# Patient Record
Sex: FEMALE | Race: BLACK OR AFRICAN AMERICAN | NOT HISPANIC OR LATINO | Employment: OTHER | URBAN - METROPOLITAN AREA
[De-identification: names, ages, dates, MRNs, and addresses within clinical notes are randomized per-mention and may not be internally consistent; named-entity substitution may affect disease eponyms.]

---

## 2017-01-19 DIAGNOSIS — M06.9 RHEUMATOID ARTHRITIS OF HAND, UNSPECIFIED LATERALITY, UNSPECIFIED RHEUMATOID FACTOR PRESENCE: ICD-10-CM

## 2017-01-19 DIAGNOSIS — M16.9 OSTEOARTHROSIS OF HIP: ICD-10-CM

## 2017-01-19 DIAGNOSIS — M25.552 HIP PAIN, LEFT: ICD-10-CM

## 2017-01-19 DIAGNOSIS — E03.9 ACQUIRED HYPOTHYROIDISM: ICD-10-CM

## 2017-01-20 RX ORDER — METHYLPREDNISOLONE 4 MG/1
TABLET ORAL
Qty: 21 TABLET | Refills: 3 | Status: SHIPPED | OUTPATIENT
Start: 2017-01-20 | End: 2017-01-30

## 2017-01-20 RX ORDER — LEFLUNOMIDE 20 MG/1
TABLET ORAL
Qty: 30 TABLET | Refills: 3 | Status: SHIPPED | OUTPATIENT
Start: 2017-01-20 | End: 2017-01-30 | Stop reason: SDUPTHER

## 2017-01-30 ENCOUNTER — OFFICE VISIT (OUTPATIENT)
Dept: RHEUMATOLOGY | Facility: CLINIC | Age: 62
End: 2017-01-30
Payer: MEDICARE

## 2017-01-30 VITALS
HEART RATE: 80 BPM | WEIGHT: 218.5 LBS | DIASTOLIC BLOOD PRESSURE: 64 MMHG | SYSTOLIC BLOOD PRESSURE: 105 MMHG | BODY MASS INDEX: 37.5 KG/M2

## 2017-01-30 DIAGNOSIS — M16.0 PRIMARY OSTEOARTHRITIS OF BOTH HIPS: ICD-10-CM

## 2017-01-30 DIAGNOSIS — R70.0 ESR RAISED: ICD-10-CM

## 2017-01-30 DIAGNOSIS — E03.9 ACQUIRED HYPOTHYROIDISM: ICD-10-CM

## 2017-01-30 DIAGNOSIS — M25.552 HIP PAIN, LEFT: ICD-10-CM

## 2017-01-30 DIAGNOSIS — M16.9 OSTEOARTHROSIS OF HIP: ICD-10-CM

## 2017-01-30 DIAGNOSIS — M06.9 RHEUMATOID ARTHRITIS OF HAND, UNSPECIFIED LATERALITY, UNSPECIFIED RHEUMATOID FACTOR PRESENCE: ICD-10-CM

## 2017-01-30 PROCEDURE — 3078F DIAST BP <80 MM HG: CPT | Mod: S$GLB,,, | Performed by: INTERNAL MEDICINE

## 2017-01-30 PROCEDURE — 3074F SYST BP LT 130 MM HG: CPT | Mod: S$GLB,,, | Performed by: INTERNAL MEDICINE

## 2017-01-30 PROCEDURE — 99215 OFFICE O/P EST HI 40 MIN: CPT | Mod: 25,S$GLB,, | Performed by: INTERNAL MEDICINE

## 2017-01-30 PROCEDURE — 99499 UNLISTED E&M SERVICE: CPT | Mod: S$GLB,,, | Performed by: INTERNAL MEDICINE

## 2017-01-30 PROCEDURE — 96372 THER/PROPH/DIAG INJ SC/IM: CPT | Mod: S$GLB,,, | Performed by: INTERNAL MEDICINE

## 2017-01-30 PROCEDURE — 99999 PR PBB SHADOW E&M-EST. PATIENT-LVL III: CPT | Mod: PBBFAC,,, | Performed by: INTERNAL MEDICINE

## 2017-01-30 RX ORDER — LEFLUNOMIDE 20 MG/1
20 TABLET ORAL DAILY
Qty: 30 TABLET | Refills: 6 | Status: SHIPPED | OUTPATIENT
Start: 2017-01-30 | End: 2017-06-27 | Stop reason: SDUPTHER

## 2017-01-30 RX ORDER — METHYLPREDNISOLONE ACETATE 80 MG/ML
160 INJECTION, SUSPENSION INTRA-ARTICULAR; INTRALESIONAL; INTRAMUSCULAR; SOFT TISSUE
Status: COMPLETED | OUTPATIENT
Start: 2017-01-30 | End: 2017-01-30

## 2017-01-30 RX ORDER — CYANOCOBALAMIN 1000 UG/ML
1000 INJECTION, SOLUTION INTRAMUSCULAR; SUBCUTANEOUS
Qty: 30 ML | Refills: 3 | Status: SHIPPED | OUTPATIENT
Start: 2017-01-30 | End: 2017-01-30

## 2017-01-30 RX ORDER — KETOROLAC TROMETHAMINE 30 MG/ML
60 INJECTION, SOLUTION INTRAMUSCULAR; INTRAVENOUS
Status: COMPLETED | OUTPATIENT
Start: 2017-01-30 | End: 2017-01-30

## 2017-01-30 RX ORDER — CELECOXIB 200 MG/1
200 CAPSULE ORAL DAILY
Qty: 30 CAPSULE | Refills: 5 | Status: SHIPPED | OUTPATIENT
Start: 2017-01-30 | End: 2017-02-17

## 2017-01-30 RX ADMIN — KETOROLAC TROMETHAMINE 60 MG: 30 INJECTION, SOLUTION INTRAMUSCULAR; INTRAVENOUS at 04:01

## 2017-01-30 RX ADMIN — METHYLPREDNISOLONE ACETATE 160 MG: 80 INJECTION, SUSPENSION INTRA-ARTICULAR; INTRALESIONAL; INTRAMUSCULAR; SOFT TISSUE at 04:01

## 2017-01-30 ASSESSMENT — ROUTINE ASSESSMENT OF PATIENT INDEX DATA (RAPID3)
FATIGUE SCORE: 5
PATIENT GLOBAL ASSESSMENT SCORE: 5
MDHAQ FUNCTION SCORE: 1.8
PAIN SCORE: 10
PSYCHOLOGICAL DISTRESS SCORE: 5.5
WHEN YOU AWAKENED IN THE MORNING OVER THE LAST WEEK, PLEASE INDICATE THE AMOUNT OF TIME IT TAKES UNTIL YOU ARE AS LIMBER AS YOU WILL BE FOR THE DAY: ALL DAY
TOTAL RAPID3 SCORE: 7
AM STIFFNESS SCORE: 1, YES

## 2017-01-30 NOTE — PROGRESS NOTES
Subjective:       Patient ID: Vida Ornelas is a 61 y.o. female.    Chief Complaint: Follow-up    HPI Comments: RA: on arava, and medrol dose pack.she had sever lower back pain, L si joint pain and hip pain.  She a si joint injection and hip injection with no releif Patient complains of arthralgias and myalgias for which has been present for a few years. Pain is located in multiple joints, both shoulder(s), both elbow(s), both wrist(s), both MCP(s): 1st, 2nd, 3rd, 4th and 5th, both PIP(s): 1st, 2nd, 3rd, 4th and 5th, both DIP(s): 1st and 2nd, both hip(s), both knee(s) and both MTP(s): 1st, 2nd, 3rd, 4th and 5th, is described as aching, pulsating, shooting and throbbing, and is constant, moderate .  Associated symptoms include: crepitation, decreased range of motion, edema, effusion, tenderness and warmth.               Associated symptoms include fatigue.     Her past medical history is significant for osteoarthritis.           The symptoms have been improving. Affected locations include the neck, right MCP, right PIP, right DIP, left PIP, left DIP, left MCP and left wrist. Associated symptoms include fatigue.     Her past medical history is significant for osteoarthritis. Factors aggravating her arthritis include activity.           Associated symptoms include fatigue.     Her past medical history is significant for osteoarthritis.   Hip Pain   Associated symptoms include arthralgias, fatigue and neck pain. Pertinent negatives include no abdominal pain, anorexia, change in bowel habit, chest pain, chills, congestion, coughing, diaphoresis, nausea, numbness, rash, sore throat, vomiting or weakness.   Osteoarthritis   Associated symptoms include arthralgias, fatigue and neck pain. Pertinent negatives include no abdominal pain, anorexia, change in bowel habit, chest pain, chills, congestion, coughing, diaphoresis, nausea, numbness, rash, sore throat, vomiting or weakness.     Review of Systems   Constitutional:  Positive for fatigue. Negative for activity change, appetite change, chills, diaphoresis and unexpected weight change.   HENT: Negative for congestion, dental problem, ear discharge, ear pain, facial swelling, mouth sores, nosebleeds, postnasal drip, rhinorrhea, sinus pressure, sneezing, sore throat, tinnitus and voice change.    Eyes: Negative for photophobia, pain, discharge, redness and itching.   Respiratory: Negative for apnea, cough, chest tightness, shortness of breath and wheezing.    Cardiovascular: Negative for chest pain, palpitations and leg swelling.   Gastrointestinal: Negative for abdominal distention, abdominal pain, anorexia, change in bowel habit, constipation, diarrhea, nausea and vomiting.   Endocrine: Negative for cold intolerance, heat intolerance, polydipsia and polyuria.   Genitourinary: Negative for decreased urine volume, difficulty urinating, flank pain, frequency, hematuria and urgency.   Musculoskeletal: Positive for arthralgias and neck pain. Negative for back pain, gait problem and neck stiffness.   Skin: Negative for pallor, rash and wound.   Allergic/Immunologic: Negative for immunocompromised state.   Neurological: Negative for dizziness, tremors, weakness and numbness.   Hematological: Negative for adenopathy. Does not bruise/bleed easily.   Psychiatric/Behavioral: Negative for sleep disturbance. The patient is not nervous/anxious.          Objective:     Visit Vitals    /64 (BP Location: Left arm, Patient Position: Sitting, BP Method: Automatic)    Pulse 80    Wt 99.1 kg (218 lb 7.6 oz)    BMI 37.5 kg/m2        Physical Exam   Nursing note and vitals reviewed.  Constitutional: She is oriented to person, place, and time and well-developed, well-nourished, and in no distress.   HENT:   Head: Normocephalic and atraumatic.   Mouth/Throat: Oropharynx is clear and moist.   Eyes: EOM are normal. Pupils are equal, round, and reactive to light.   Neck: Neck supple. No thyromegaly  present.   Cardiovascular: Normal rate, regular rhythm and normal heart sounds.  Exam reveals no gallop and no friction rub.    No murmur heard.  Pulmonary/Chest: Breath sounds normal. She has no wheezes. She has no rales. She exhibits no tenderness.   Abdominal: There is no tenderness. There is no rebound and no guarding.       Right Side Rheumatological Exam     The patient is tender to palpation of the shoulder, wrist, knee, 1st PIP, 1st MCP, 2nd PIP, 2nd MCP, 3rd PIP, 3rd MCP, 4th PIP, 4th MCP, 5th PIP and 5th MCP    She has swelling of the shoulder, elbow, wrist and knee    Shoulder Exam   Tenderness Location: no tenderness    Range of Motion   Active Abduction: abnormal   Adduction: abnormal  Sensation: normal    Knee Exam   Patellofemoral Crepitus: positive  Effusion: positive  Sensation: normal    Hip Exam   Tenderness Location: posterior and anterior    Range of Motion   Internal Rotation: abnormal   External Rotation: abnormal   Sensation: normal    Elbow/Wrist Exam   Tenderness Location: no tenderness  Sensation: normal    Left Side Rheumatological Exam     Examination finds the elbow normal.    The patient is tender to palpation of the wrist, knee, 1st PIP, 1st MCP, 2nd PIP, 2nd MCP, 3rd PIP, 3rd MCP, 4th PIP, 4th MCP, 5th PIP and 5th MCP.    She has swelling of the shoulder, wrist and knee    Shoulder Exam   Tenderness Location: no tenderness    Range of Motion   Active Abduction: abnormal   Sensation: normal    Knee Exam     Patellofemoral Crepitus: negative  Effusion: negative  Sensation: normal    Hip Exam   Tenderness Location: no tenderness  Sensation: normal    Elbow/Wrist Exam   Sensation: normal      Back/Neck Exam   Tenderness Right paramedian tenderness of the Lower C-Spine and Upper C-Spine.Left paramedian tenderness of the Lower C-Spine and Upper C-Spine.      Lymphadenopathy:     She has no cervical adenopathy.   Neurological: She is alert and oriented to person, place, and time. Gait  normal.   Skin: No rash noted. No erythema. No pallor.     Psychiatric: Mood and affect normal.   Musculoskeletal: She exhibits edema, tenderness and deformity.          Results for orders placed or performed in visit on 09/26/16   Comprehensive metabolic panel   Result Value Ref Range    Sodium 142 136 - 145 mmol/L    Potassium 3.8 3.5 - 5.1 mmol/L    Chloride 107 95 - 110 mmol/L    CO2 27 23 - 29 mmol/L    Glucose 88 70 - 110 mg/dL    BUN, Bld 15 6 - 20 mg/dL    Creatinine 0.8 0.5 - 1.4 mg/dL    Calcium 9.9 8.7 - 10.5 mg/dL    Total Protein 7.1 6.0 - 8.4 g/dL    Albumin 3.7 3.5 - 5.2 g/dL    Total Bilirubin 0.5 0.1 - 1.0 mg/dL    Alkaline Phosphatase 98 55 - 135 U/L    AST 21 10 - 40 U/L    ALT 15 10 - 44 U/L    Anion Gap 8 8 - 16 mmol/L    eGFR if African American >60.0 >60 mL/min/1.73 m^2    eGFR if non African American >60.0 >60 mL/min/1.73 m^2   CBC auto differential   Result Value Ref Range    WBC 5.05 3.90 - 12.70 K/uL    RBC 4.50 4.00 - 5.40 M/uL    Hemoglobin 12.6 12.0 - 16.0 g/dL    Hematocrit 39.0 37.0 - 48.5 %    MCV 87 82 - 98 fL    MCH 28.0 27.0 - 31.0 pg    MCHC 32.3 32.0 - 36.0 %    RDW 13.8 11.5 - 14.5 %    Platelets 214 150 - 350 K/uL    MPV 12.8 9.2 - 12.9 fL    Gran # 2.0 1.8 - 7.7 K/uL    Lymph # 2.4 1.0 - 4.8 K/uL    Mono # 0.5 0.3 - 1.0 K/uL    Eos # 0.1 0.0 - 0.5 K/uL    Baso # 0.04 0.00 - 0.20 K/uL    Gran% 39.2 38.0 - 73.0 %    Lymph% 48.1 (H) 18.0 - 48.0 %    Mono% 9.9 4.0 - 15.0 %    Eosinophil% 2.0 0.0 - 8.0 %    Basophil% 0.8 0.0 - 1.9 %    Differential Method Automated    Cyclic citrul peptide antibody, IgG   Result Value Ref Range    CCP Antibodies <0.5 <5.0 U/mL   Rheumatoid factor   Result Value Ref Range    Rheumatoid Factor <10.0 0.0 - 15.0 IU/mL   Sedimentation rate, manual   Result Value Ref Range    Sed Rate 28 (H) 0 - 20 mm/Hr   Protein electrophoresis, serum   Result Value Ref Range    Protein, Serum 6.7 6.0 - 8.4 g/dL    Albumin grams/dl 3.93 3.35 - 5.55 g/dL    Alpha-1  grams/dl 0.33 0.17 - 0.41 g/dL    Alpha-2 grams/dl 0.80 0.43 - 0.99 g/dL    Beta grams/dl 0.82 0.50 - 1.10 g/dL    Gamma grams/dl 0.82 0.67 - 1.58 g/dL   Immunoglobulin free LT chains blood   Result Value Ref Range    Kappa Free Light Chains 0.96 0.33 - 1.94 mg/dL    Lambda Free Light Chains 0.77 0.57 - 2.63 mg/dL    Kappa/Lambda FLC Ratio 1.25 0.26 - 1.65   Pathologist Interpretation SPE   Result Value Ref Range    Pathologist Interpretation SPE REVIEWED    WBC3.90 - 12.70 K/uL  2.61 (L)  3.68 (L)  4.53  5.89  4.04  RBC4.00 - 5.40 M/uL  4.32  5.09  4.47  4.26  4.42  Rqiuncaasp22.0 - 16.0 g/dL  11.9 (L)  14.0  12.5  11.7 (L)  12.5  Blehkbiias41.0 - 48.5 %  37.6  42.5  39.1  36.8 (L)  37.7  MCV82 - 98 fL  87  84  88  86  85  MCH27.0 - 31.0 pg  27.5  27.5  28.0  27.5  28.3  MCHC32.0 - 36.0 %  31.6 (L)  32.9  32.0  31.8 (L)  33.2  RDW11.5 - 14.5 %  13.3  12.2  13.6  13.5  14.1  Hvjjtoxbe719 - 350 K/uL  169  167  189  182  186  MPV9.2 - 12.9 fL  SEE COMMENT  SEE COMMENTCM  SEE COMMENTCM  SEE COMMENTCM  12.5  Comments: Result not available. Gran #1.8 - 7.7 K/uL  1.0 (L)  1.7 (L)  2.3  4.4  1.9  Lymph #1.0 - 4.8 K/uL  1.2  1.5  1.6  1.1  1.6  Mono #0.3 - 1.0 K/uL  0.3  0.5  0.4  0.4  0.4  Eos #0.0 - 0.5 K/uL  0.1  0.1  0.1  0.0  0.1  Baso #0.00 - 0.20 K/uL  0.02  0.03  0.02  0.01  0.02  Gran%38.0 - 73.0 %  37.2 (L)  44.9  51.7  74.7 (H)  47.5  Lymph%18.0 - 48.0 %  47.1  40.2  36.2  18.3  39.9  Mono%4.0 - 15.0 %  11.5  12.2  9.7  6.6  10.1  Eosinophil%0.0 - 8.0 %  3.4  1.9  2.0  0.2  2.0  Basophil%0.0 - 1.9 %  0.8  Sed Rate0 - 20 mm/Hr  34 (H)  29 (H)  22 (H)  41 (H)  23 (H)  Resulting [facility]  TAD ScreenNegative <1:160  Negative <1:160  Negative <1:160  Iron 30 - 160 ug/dL 80 57     Transferrin 200 - 375 mg/dL 240     TIBC 250 - 450 ug/dL 355 344.0R    Saturated Iron 20 - 50 % 23 17 (L)   Resulting Agency  [facility]      Specimen Collected: 08/22/16  9:55 AM Last Resulted: 08/22/16  3:50 PM            Assessment:        Encounter Diagnoses   Name Primary?    Rheumatoid arthritis of hand, unspecified laterality, unspecified rheumatoid factor presence     Hip pain, left     Osteoarthrosis of hip     Acquired hypothyroidism     ESR raised     Primary osteoarthritis of both hips          Plan:     Rheumatoid arthritis of hand, unspecified laterality, unspecified rheumatoid factor presence  -     Discontinue: cyanocobalamin 1,000 mcg/mL injection; Inject 1 mL (1,000 mcg total) into the skin every 7 days.  Dispense: 30 mL; Refill: 3  -     leflunomide (ARAVA) 20 MG Tab; Take 1 tablet (20 mg total) by mouth once daily.  Dispense: 30 tablet; Refill: 6  -     methylPREDNISolone acetate injection 160 mg; Inject 2 mLs (160 mg total) into the muscle one time.  -     ketorolac injection 60 mg; Inject 2 mLs (60 mg total) into the muscle one time.  -     Comprehensive metabolic panel; Future; Expected date: 1/30/17  -     CBC auto differential; Future; Expected date: 1/30/17  -     C-reactive protein; Future; Expected date: 1/30/17  -     Sedimentation rate, manual; Future; Expected date: 1/30/17  -     X-Ray Hip 2 View Left; Future; Expected date: 1/30/17    Hip pain, left  -     Discontinue: cyanocobalamin 1,000 mcg/mL injection; Inject 1 mL (1,000 mcg total) into the skin every 7 days.  Dispense: 30 mL; Refill: 3  -     leflunomide (ARAVA) 20 MG Tab; Take 1 tablet (20 mg total) by mouth once daily.  Dispense: 30 tablet; Refill: 6  -     methylPREDNISolone acetate injection 160 mg; Inject 2 mLs (160 mg total) into the muscle one time.  -     ketorolac injection 60 mg; Inject 2 mLs (60 mg total) into the muscle one time.  -     Comprehensive metabolic panel; Future; Expected date: 1/30/17  -     CBC auto differential; Future; Expected date: 1/30/17  -     C-reactive protein; Future; Expected date: 1/30/17  -     Sedimentation rate, manual; Future; Expected date: 1/30/17  -     X-Ray Hip 2 View Left; Future; Expected date:  1/30/17    Osteoarthrosis of hip  -     Discontinue: cyanocobalamin 1,000 mcg/mL injection; Inject 1 mL (1,000 mcg total) into the skin every 7 days.  Dispense: 30 mL; Refill: 3  -     leflunomide (ARAVA) 20 MG Tab; Take 1 tablet (20 mg total) by mouth once daily.  Dispense: 30 tablet; Refill: 6  -     methylPREDNISolone acetate injection 160 mg; Inject 2 mLs (160 mg total) into the muscle one time.  -     ketorolac injection 60 mg; Inject 2 mLs (60 mg total) into the muscle one time.  -     Comprehensive metabolic panel; Future; Expected date: 1/30/17  -     CBC auto differential; Future; Expected date: 1/30/17  -     C-reactive protein; Future; Expected date: 1/30/17  -     Sedimentation rate, manual; Future; Expected date: 1/30/17  -     X-Ray Hip 2 View Left; Future; Expected date: 1/30/17    Acquired hypothyroidism  -     Discontinue: cyanocobalamin 1,000 mcg/mL injection; Inject 1 mL (1,000 mcg total) into the skin every 7 days.  Dispense: 30 mL; Refill: 3  -     leflunomide (ARAVA) 20 MG Tab; Take 1 tablet (20 mg total) by mouth once daily.  Dispense: 30 tablet; Refill: 6  -     methylPREDNISolone acetate injection 160 mg; Inject 2 mLs (160 mg total) into the muscle one time.  -     ketorolac injection 60 mg; Inject 2 mLs (60 mg total) into the muscle one time.  -     Comprehensive metabolic panel; Future; Expected date: 1/30/17  -     CBC auto differential; Future; Expected date: 1/30/17  -     C-reactive protein; Future; Expected date: 1/30/17  -     Sedimentation rate, manual; Future; Expected date: 1/30/17  -     X-Ray Hip 2 View Left; Future; Expected date: 1/30/17    ESR raised  -     Discontinue: cyanocobalamin 1,000 mcg/mL injection; Inject 1 mL (1,000 mcg total) into the skin every 7 days.  Dispense: 30 mL; Refill: 3  -     leflunomide (ARAVA) 20 MG Tab; Take 1 tablet (20 mg total) by mouth once daily.  Dispense: 30 tablet; Refill: 6  -     methylPREDNISolone acetate injection 160 mg; Inject 2 mLs  (160 mg total) into the muscle one time.  -     ketorolac injection 60 mg; Inject 2 mLs (60 mg total) into the muscle one time.  -     Comprehensive metabolic panel; Future; Expected date: 1/30/17  -     CBC auto differential; Future; Expected date: 1/30/17  -     C-reactive protein; Future; Expected date: 1/30/17  -     Sedimentation rate, manual; Future; Expected date: 1/30/17  -     X-Ray Hip 2 View Left; Future; Expected date: 1/30/17    Primary osteoarthritis of both hips  -     Discontinue: cyanocobalamin 1,000 mcg/mL injection; Inject 1 mL (1,000 mcg total) into the skin every 7 days.  Dispense: 30 mL; Refill: 3  -     leflunomide (ARAVA) 20 MG Tab; Take 1 tablet (20 mg total) by mouth once daily.  Dispense: 30 tablet; Refill: 6  -     methylPREDNISolone acetate injection 160 mg; Inject 2 mLs (160 mg total) into the muscle one time.  -     ketorolac injection 60 mg; Inject 2 mLs (60 mg total) into the muscle one time.  -     Comprehensive metabolic panel; Future; Expected date: 1/30/17  -     CBC auto differential; Future; Expected date: 1/30/17  -     C-reactive protein; Future; Expected date: 1/30/17  -     Sedimentation rate, manual; Future; Expected date: 1/30/17  -     X-Ray Hip 2 View Left; Future; Expected date: 1/30/17    Other orders  -     celecoxib (CELEBREX) 200 MG capsule; Take 1 capsule (200 mg total) by mouth once daily.  Dispense: 30 capsule; Refill: 5

## 2017-01-30 NOTE — PROGRESS NOTES
Administered 2 cc ( 30 mg/ml ) of toradol tot he right upper outer gluteal. Informed of s/s to report verbalized understanding. No adverse reactions noted.    Lot # [id number]  Expiration 1 may 2018    Administered 2 cc ( 80 mg/ml ) of depomedrol to the right upper outer gluteal. Informed of s/s to report verbalized understanding. No adverse reactions noted.    Lot # [id number]  Expiration 10/2017

## 2017-01-30 NOTE — MR AVS SNAPSHOT
[facility]  [address] [facility] [address] [place] [address]  Phone: [phone]  Fax: [phone]                  iVda Ornelas   2017 2:30 PM   Office Visit    Description:  Female : 1955   Provider:  Baljinder Kenyon MD   Department:  [facility]           Reason for Visit     Follow-up           Diagnoses this Visit        Comments    Rheumatoid arthritis of hand, unspecified laterality, unspecified rheumatoid factor presence         Hip pain, left         Osteoarthrosis of hip         Acquired hypothyroidism         ESR raised         Primary osteoarthritis of both hips                To Do List           Future Appointments        Provider Department Dept Phone    2017 10:00 AM DC XR1 [facility] [phone]    2017 10:10 AM LABORATORY, [place] [facility] [phone]    2017 10:00 AM Baljinder Kenyon MD [facility] Rheumatology [phone]    2017 1:00 PM Suman Brand MD [facility] - [phone]      Goals (5 Years of Data)     None      Follow-Up and Disposition     Return in about 3 months (around 2017).       These Medications        Disp Refills Start End    leflunomide (ARAVA) 20 MG Tab 30 tablet 6 2017     Take 1 tablet (20 mg total) by mouth once daily. - Oral    Pharmacy: [facility] [place] - [address] Ph #: [phone]       celecoxib (CELEBREX) 200 MG capsule 30 capsule 5 2017     Take 1 capsule (200 mg total) by mouth once daily. - Oral    Pharmacy: [facility] [place], [place] - [address] Ph #: [phone]         [facility]     [facility] -  Assistance  Registered nurses in the [facility] provide clinical advisement, health education, appointment booking, and other advisory services.  Call for this free service at [phone].             Medications            Message regarding Medications     Verify the changes and/or additions to your medication regime listed below are the same as discussed with your clinician today.  If any of these changes or additions are incorrect, please notify your healthcare provider.        START taking these NEW medications        Refills    celecoxib (CELEBREX) 200 MG capsule 5    Sig: Take 1 capsule (200 mg total) by mouth once daily.    Class: Normal    Route: Oral      These medications were administered today        Dose Freq    methylPREDNISolone acetate injection 160 mg 160 mg Clinic/HOD 1 time    Sig: Inject 2 mLs (160 mg total) into the muscle one time.    Class: Normal    Route: Intramuscular    ketorolac injection 60 mg 60 mg Clinic/HOD 1 time    Sig: Inject 2 mLs (60 mg total) into the muscle one time.    Class: Normal    Route: Intramuscular      STOP taking these medications     gabapentin (NEURONTIN) 300 MG capsule Take 300 mg by mouth 3 (three) times daily.     methylPREDNISolone (MEDROL DOSEPACK) 4 mg tablet use as directed    pantoprazole (PROTONIX) 40 MG tablet Take 1 tablet (40 mg total) by mouth once daily.    sucralfate (CARAFATE) 1 gram tablet Take 1 tablet (1 g total) by mouth 4 (four) times daily.    cyanocobalamin 1,000 mcg/mL injection Inject 1 mL (1,000 mcg total) into the skin every 7 days.           Verify that the below list of medications is an accurate representation of the medications you are currently taking.  If none reported, the list may be blank. If incorrect, please contact your healthcare provider. Carry this list with you in case of emergency.           Current Medications     fish oil-omega-3 fatty acids 300-1,000 mg capsule Take 2 g by mouth once daily.    hydrocodone-acetaminophen 10-325mg (NORCO)  mg Tab Take 1 tablet by mouth.    leflunomide (ARAVA) 20 MG Tab Take 1 tablet (20 mg total) by mouth once daily.    levothyroxine (SYNTHROID) 25 MCG tablet TAKE ONE TABLET BY MOUTH before breakfast  -hold all food and meds for 30 minutes after taking    lidocaine HCL 2% (XYLOCAINE) 2 % jelly Apply ONE a small amount to affected area three times a day as needed    multivitamin (ONE DAILY MULTIVITAMIN) per tablet Take 1 tablet by mouth once daily.    VOLTAREN 1 % Gel     celecoxib (CELEBREX) 200 MG capsule Take 1 capsule (200 mg total) by mouth once daily.           Clinical Reference Information           Vital Signs - Last Recorded  Most recent update: 1/30/2017  3:19 PM by Ev Rodriguez LPN    BP Pulse Wt BMI       105/64 (BP Location: Left arm, Patient Position: Sitting, BP Method: Automatic) 80 99.1 kg (218 lb 7.6 oz) 37.5 kg/m2       Blood Pressure          Most Recent Value    BP  105/64      Allergies as of 1/30/2017     Ibuprofen    Nifedipine    Plaquenil [Hydroxychloroquine]    Tacrolimus    Penicillins      Immunizations Administered on Date of Encounter - 1/30/2017     None      Orders Placed During Today's Visit     Future Labs/Procedures Expected by Expires    C-reactive protein  1/30/2017 3/31/2018    CBC auto differential  1/30/2017 3/31/2018    Comprehensive metabolic panel  1/30/2017 3/31/2018    Sedimentation rate, manual  1/30/2017 3/31/2018    X-Ray Hip 2 View Left  1/30/2017 1/30/2018      Administrations This Visit     ketorolac injection 60 mg     Admin Date Action Dose Route Administered By             01/30/2017 Given 60 mg Intramuscular Ev Rodriguez LPN                    methylPREDNISolone acetate injection 160 mg     Admin Date Action Dose Route Administered By             01/30/2017 Given 160 mg Intramuscular Ev Rodriguez LPN                      Instructions    Added celebrex 200 mg daily  Continue arava daily   only get shots if you are

## 2017-02-06 ENCOUNTER — HOSPITAL ENCOUNTER (OUTPATIENT)
Dept: RADIOLOGY | Facility: HOSPITAL | Age: 62
Discharge: HOME | End: 2017-02-06
Attending: INTERNAL MEDICINE
Payer: MEDICARE

## 2017-02-06 DIAGNOSIS — M06.9 RHEUMATOID ARTHRITIS OF HAND, UNSPECIFIED LATERALITY, UNSPECIFIED RHEUMATOID FACTOR PRESENCE: ICD-10-CM

## 2017-02-06 DIAGNOSIS — R70.0 ESR RAISED: ICD-10-CM

## 2017-02-06 DIAGNOSIS — M16.0 PRIMARY OSTEOARTHRITIS OF BOTH HIPS: ICD-10-CM

## 2017-02-06 DIAGNOSIS — M16.9 OSTEOARTHROSIS OF HIP: ICD-10-CM

## 2017-02-06 DIAGNOSIS — E03.9 ACQUIRED HYPOTHYROIDISM: ICD-10-CM

## 2017-02-06 DIAGNOSIS — M25.552 HIP PAIN, LEFT: ICD-10-CM

## 2017-02-06 PROCEDURE — 73502 X-RAY EXAM HIP UNI 2-3 VIEWS: CPT | Mod: 26,LT,, | Performed by: RADIOLOGY

## 2017-02-06 PROCEDURE — 73502 X-RAY EXAM HIP UNI 2-3 VIEWS: CPT | Mod: TC,PO,LT

## 2017-02-17 ENCOUNTER — OFFICE VISIT (OUTPATIENT)
Dept: FAMILY MEDICINE | Facility: CLINIC | Age: 62
End: 2017-02-17
Payer: MEDICARE

## 2017-02-17 ENCOUNTER — HOSPITAL ENCOUNTER (OUTPATIENT)
Dept: RADIOLOGY | Facility: HOSPITAL | Age: 62
Discharge: HOME | End: 2017-02-17
Attending: NURSE PRACTITIONER
Payer: MEDICARE

## 2017-02-17 VITALS
DIASTOLIC BLOOD PRESSURE: 92 MMHG | RESPIRATION RATE: 18 BRPM | SYSTOLIC BLOOD PRESSURE: 132 MMHG | TEMPERATURE: 98 F | BODY MASS INDEX: 36.25 KG/M2 | WEIGHT: 212.31 LBS | HEIGHT: 64 IN | HEART RATE: 67 BPM | OXYGEN SATURATION: 99 %

## 2017-02-17 DIAGNOSIS — M25.552 LEFT HIP PAIN: ICD-10-CM

## 2017-02-17 DIAGNOSIS — M54.50 LEFT-SIDED LOW BACK PAIN WITHOUT SCIATICA, UNSPECIFIED CHRONICITY: Primary | ICD-10-CM

## 2017-02-17 DIAGNOSIS — M06.9 RHEUMATOID ARTHRITIS INVOLVING MULTIPLE SITES, UNSPECIFIED RHEUMATOID FACTOR PRESENCE: ICD-10-CM

## 2017-02-17 DIAGNOSIS — R11.0 NAUSEA: ICD-10-CM

## 2017-02-17 DIAGNOSIS — M47.817 FACET JOINT DISEASE OF LUMBOSACRAL REGION: ICD-10-CM

## 2017-02-17 DIAGNOSIS — M54.50 LEFT-SIDED LOW BACK PAIN WITHOUT SCIATICA, UNSPECIFIED CHRONICITY: ICD-10-CM

## 2017-02-17 DIAGNOSIS — M16.9 OSTEOARTHROSIS OF HIP: ICD-10-CM

## 2017-02-17 PROCEDURE — 72110 X-RAY EXAM L-2 SPINE 4/>VWS: CPT | Mod: 26,,, | Performed by: RADIOLOGY

## 2017-02-17 PROCEDURE — 99999 PR PBB SHADOW E&M-EST. PATIENT-LVL IV: CPT | Mod: PBBFAC,,, | Performed by: NURSE PRACTITIONER

## 2017-02-17 PROCEDURE — 3075F SYST BP GE 130 - 139MM HG: CPT | Mod: S$GLB,,, | Performed by: NURSE PRACTITIONER

## 2017-02-17 PROCEDURE — 96372 THER/PROPH/DIAG INJ SC/IM: CPT | Mod: S$GLB,,, | Performed by: NURSE PRACTITIONER

## 2017-02-17 PROCEDURE — 99499 UNLISTED E&M SERVICE: CPT | Mod: S$GLB,,, | Performed by: NURSE PRACTITIONER

## 2017-02-17 PROCEDURE — 3080F DIAST BP >= 90 MM HG: CPT | Mod: S$GLB,,, | Performed by: NURSE PRACTITIONER

## 2017-02-17 PROCEDURE — 99214 OFFICE O/P EST MOD 30 MIN: CPT | Mod: 25,S$GLB,, | Performed by: NURSE PRACTITIONER

## 2017-02-17 PROCEDURE — 72110 X-RAY EXAM L-2 SPINE 4/>VWS: CPT | Mod: TC,PO

## 2017-02-17 RX ORDER — GABAPENTIN 300 MG/1
2 CAPSULE ORAL 3 TIMES DAILY PRN
Refills: 1 | COMMUNITY
Start: 2016-11-16 | End: 2017-06-27

## 2017-02-17 RX ORDER — ONDANSETRON HYDROCHLORIDE 8 MG/1
8 TABLET, FILM COATED ORAL EVERY 8 HOURS PRN
Qty: 15 TABLET | Refills: 0 | Status: SHIPPED | OUTPATIENT
Start: 2017-02-17 | End: 2017-06-27

## 2017-02-17 RX ORDER — ONDANSETRON 2 MG/ML
4 INJECTION INTRAMUSCULAR; INTRAVENOUS ONCE
Status: COMPLETED | OUTPATIENT
Start: 2017-02-17 | End: 2017-02-17

## 2017-02-17 RX ORDER — NAPROXEN 500 MG/1
500 TABLET ORAL 2 TIMES DAILY
Qty: 20 TABLET | Refills: 0 | Status: SHIPPED | OUTPATIENT
Start: 2017-02-17 | End: 2017-06-27

## 2017-02-17 RX ORDER — KETOROLAC TROMETHAMINE 30 MG/ML
60 INJECTION, SOLUTION INTRAMUSCULAR; INTRAVENOUS
Status: COMPLETED | OUTPATIENT
Start: 2017-02-17 | End: 2017-02-17

## 2017-02-17 RX ADMIN — KETOROLAC TROMETHAMINE 60 MG: 30 INJECTION, SOLUTION INTRAMUSCULAR; INTRAVENOUS at 09:02

## 2017-02-17 RX ADMIN — ONDANSETRON 4 MG: 2 INJECTION INTRAMUSCULAR; INTRAVENOUS at 09:02

## 2017-02-17 NOTE — PROGRESS NOTES
Subjective:       Patient ID: Vida Ornelas is a 61 y.o. female.    Chief Complaint: Back Pain; Hip Pain; Nausea; and Vomitting    HPI Comments: Dr. Whitman ortho did left hip injection 1 week ago.- [facility]     Pain is constant, worsens when she moves gets up out of bed. Pain is associated with nausea and vomiting. Pt denies associated diarrhea. Pt deneis f/c/s. Pt having increased urination since last night, denies odor/pain. Pt denies history of similar pains.     She was having severe pain so despite a normal x-ray. Obtain CT of the pelvis. There is no acute fracture dislocation is chronic arthritic changes patient attempted history are a she sees Dr. Whitman the orthopedic at [place]. He did do an injection one week ago. She isn't no leukocytosis afebrile no evidence of septic joint specialist sometime able to range the hip. Does appear to be more of an arthritic issue that is acute on chronic will treat with tramadol as an outpatient recommended she follow up with her orthopedic physician tomorrow for further evaluation and treatment. She is neurovascularly motor intact to the left lower extremity where she is experiencing the pain recommended returning if she develops any numbness tingling weakness or coolness to the affected extremity. Patient verbalized understanding amenable to discharge and plan at this time  Reviewed ER note - as above from last night   Pain meds injection did not help patient     Hip Pain    The incident occurred more than 1 week ago. There was no injury mechanism. Pain location: lefft hip and left back - no radiating pain. The pain has been constant since onset. Associated symptoms include an inability to bear weight. Pertinent negatives include no loss of motion, loss of sensation, muscle weakness or numbness. She reports no foreign bodies present. The symptoms are aggravated by movement and weight bearing. She has tried NSAIDs and heat for the symptoms. The treatment provided  no relief.   Nausea   This is a new problem. The current episode started 1 to 4 weeks ago. The problem has been waxing and waning. Associated symptoms include arthralgias, myalgias, nausea and vomiting. Pertinent negatives include no abdominal pain, anorexia, change in bowel habit, chest pain, chills, congestion, coughing, diaphoresis, fatigue, fever, headaches, joint swelling, neck pain, numbness, rash, sore throat, swollen glands, urinary symptoms, vertigo, visual change or weakness. The symptoms are aggravated by standing. She has tried nothing for the symptoms. The treatment provided no relief.     Vitals:    02/17/17 0839   BP: (!) 132/92   Pulse: 67   Resp: 18   Temp: 97.6 °F (36.4 °C)     Review of Systems   Constitutional: Negative.  Negative for chills, diaphoresis, fatigue and fever.   HENT: Negative.  Negative for congestion and sore throat.    Eyes: Negative.    Respiratory: Negative.  Negative for cough, shortness of breath and wheezing.    Cardiovascular: Negative.  Negative for chest pain.   Gastrointestinal: Positive for nausea and vomiting. Negative for abdominal pain, anorexia, change in bowel habit and diarrhea.   Endocrine: Negative.    Genitourinary: Negative.  Negative for hematuria.   Musculoskeletal: Positive for arthralgias, back pain and myalgias. Negative for joint swelling and neck pain.   Skin: Negative.  Negative for color change and rash.   Allergic/Immunologic: Negative.    Neurological: Negative.  Negative for vertigo, speech difficulty, weakness, numbness and headaches.   Hematological: Negative.    Psychiatric/Behavioral: Negative.        Past Medical History   Diagnosis Date    Arthritis      Seronegative rheumatoid arthritis    Back pain      s/p ena     Colon polyp     ESR raised     GERD with stricture     Hyperlipidemia     Hypothyroidism     Irritable bowel syndrome     Peptic ulcer disease     RA (rheumatoid arthritis)     Vitamin D deficiency      Objective:       Physical Exam   Constitutional: She is oriented to person, place, and time. She appears well-developed and well-nourished.   HENT:   Head: Normocephalic and atraumatic.   Mouth/Throat: Oropharynx is clear and moist.   Eyes: Conjunctivae and EOM are normal. Pupils are equal, round, and reactive to light.   Neck: Neck supple.   Cardiovascular: Normal rate, regular rhythm, normal heart sounds and intact distal pulses.  Exam reveals no friction rub.    No murmur heard.  Pulmonary/Chest: Effort normal and breath sounds normal. No respiratory distress. She has no wheezes.   Abdominal: Soft. Bowel sounds are normal.   Musculoskeletal: Normal range of motion.        Back:    Was able to move left hip all around - flexion, extension, abduction, adduction - normal    Neurological: She is alert and oriented to person, place, and time.   Skin: Skin is warm and dry.   Psychiatric: She has a normal mood and affect. Her behavior is normal. Judgment and thought content normal.   Nursing note and vitals reviewed.      Assessment:       1. Left-sided low back pain without sciatica, unspecified chronicity    2. Left hip pain    3. Nausea    4. Osteoarthrosis of hip    5. Rheumatoid arthritis involving multiple sites, unspecified rheumatoid factor presence    6. Facet joint disease of lumbosacral region        Plan:       Left-sided low back pain without sciatica, unspecified chronicity  -     ketorolac injection 60 mg; Inject 2 mLs (60 mg total) into the muscle one time.  -     X-Ray Lumbar Spine Complete 5 View; Future; Expected date: 2/17/17  -     naproxen (EC NAPROSYN) 500 MG EC tablet; Take 1 tablet (500 mg total) by mouth 2 (two) times daily.  Dispense: 20 tablet; Refill: 0  -     ondansetron (ZOFRAN) 8 MG tablet; Take 1 tablet (8 mg total) by mouth every 8 (eight) hours as needed for Nausea.  Dispense: 15 tablet; Refill: 0    Left hip pain  -     ketorolac injection 60 mg; Inject 2 mLs (60 mg total) into the muscle one  time.  -     naproxen (EC NAPROSYN) 500 MG EC tablet; Take 1 tablet (500 mg total) by mouth 2 (two) times daily.  Dispense: 20 tablet; Refill: 0  -     ondansetron (ZOFRAN) 8 MG tablet; Take 1 tablet (8 mg total) by mouth every 8 (eight) hours as needed for Nausea.  Dispense: 15 tablet; Refill: 0    Nausea- from pain   -     ondansetron injection 4 mg; Inject 4 mg into the muscle once.    Osteoarthrosis of hip  She is followed by Dr Whitman in [facility] and got pain injection in hip recently  Going to therapy for this also   She has pain meds at home from pain mgt     Rheumatoid arthritis involving multiple sites, unspecified rheumatoid factor presence  Stable     Facet joint disease of lumbosacral region  Discussed with her - that she needs to call her pain mgt doc to get back in - therapy has aggravated her pain in her lower back     Fu if not better

## 2017-03-03 ENCOUNTER — TELEPHONE (OUTPATIENT)
Dept: FAMILY MEDICINE | Facility: CLINIC | Age: 62
End: 2017-03-03

## 2017-03-03 NOTE — TELEPHONE ENCOUNTER
----- Message from Kelsey Forte sent at 3/3/2017  7:13 AM CST -----  Contact: pt   Patient called and asked if you will call in a different medication the   Naproxen hurts her stomach. Call  Back [phone]

## 2017-03-03 NOTE — TELEPHONE ENCOUNTER
Patient requesting different anti inflammatory besides Naproxen until she sees pain management 3/19/17. Pt reports pain relief after taking med however she also reports abdominal cramping and spasms as well.   Please advise.

## 2017-03-03 NOTE — TELEPHONE ENCOUNTER
Phoned pt in regards to message below. Pt voiced understanding for the new instructions for the OTC Aleve. CLC

## 2017-03-22 ENCOUNTER — TELEPHONE (OUTPATIENT)
Dept: RHEUMATOLOGY | Facility: CLINIC | Age: 62
End: 2017-03-22

## 2017-03-22 NOTE — TELEPHONE ENCOUNTER
Called pt and notified her of results. Pt states that she does an a provider that will perform hip replacement surgery. Pt verbalized understanding.

## 2017-03-22 NOTE — TELEPHONE ENCOUNTER
----- Message from Baljinder Kenyon MD sent at 3/15/2017  1:37 PM CDT -----  Please call Mrs Mcpherson and tell her she was right that the L hip has advanced changes with severe narrowing and spurs. Ask if we need to refer her to someone for hip replacement surgery

## 2017-05-01 ENCOUNTER — LAB VISIT (OUTPATIENT)
Dept: LAB | Facility: HOSPITAL | Age: 62
End: 2017-05-01
Attending: INTERNAL MEDICINE
Payer: MEDICARE

## 2017-05-01 DIAGNOSIS — M16.0 PRIMARY OSTEOARTHRITIS OF BOTH HIPS: ICD-10-CM

## 2017-05-01 DIAGNOSIS — M06.9 RHEUMATOID ARTHRITIS OF HAND, UNSPECIFIED LATERALITY, UNSPECIFIED RHEUMATOID FACTOR PRESENCE: ICD-10-CM

## 2017-05-01 DIAGNOSIS — M25.552 HIP PAIN, LEFT: ICD-10-CM

## 2017-05-01 DIAGNOSIS — D70.2 OTHER DRUG-INDUCED NEUTROPENIA: ICD-10-CM

## 2017-05-01 DIAGNOSIS — R70.0 ESR RAISED: ICD-10-CM

## 2017-05-01 DIAGNOSIS — M16.9 OSTEOARTHROSIS OF HIP: ICD-10-CM

## 2017-05-01 DIAGNOSIS — E03.9 ACQUIRED HYPOTHYROIDISM: ICD-10-CM

## 2017-05-01 LAB
ALBUMIN SERPL BCP-MCNC: 3.3 G/DL
ALP SERPL-CCNC: 78 U/L
ALT SERPL W/O P-5'-P-CCNC: 18 U/L
ANION GAP SERPL CALC-SCNC: 6 MMOL/L
AST SERPL-CCNC: 18 U/L
BASOPHILS # BLD AUTO: 0.02 K/UL
BASOPHILS # BLD AUTO: 0.02 K/UL
BASOPHILS NFR BLD: 0.6 %
BASOPHILS NFR BLD: 0.6 %
BILIRUB SERPL-MCNC: 0.4 MG/DL
BUN SERPL-MCNC: 15 MG/DL
CALCIUM SERPL-MCNC: 9.3 MG/DL
CHLORIDE SERPL-SCNC: 109 MMOL/L
CO2 SERPL-SCNC: 28 MMOL/L
CREAT SERPL-MCNC: 0.7 MG/DL
CRP SERPL-MCNC: 3 MG/L
DIFFERENTIAL METHOD: ABNORMAL
DIFFERENTIAL METHOD: ABNORMAL
EOSINOPHIL # BLD AUTO: 0.1 K/UL
EOSINOPHIL # BLD AUTO: 0.1 K/UL
EOSINOPHIL NFR BLD: 1.7 %
EOSINOPHIL NFR BLD: 1.7 %
ERYTHROCYTE [DISTWIDTH] IN BLOOD BY AUTOMATED COUNT: 13.7 %
ERYTHROCYTE [DISTWIDTH] IN BLOOD BY AUTOMATED COUNT: 13.7 %
ERYTHROCYTE [SEDIMENTATION RATE] IN BLOOD BY WESTERGREN METHOD: 46 MM/HR
EST. GFR  (AFRICAN AMERICAN): >60 ML/MIN/1.73 M^2
EST. GFR  (NON AFRICAN AMERICAN): >60 ML/MIN/1.73 M^2
GLUCOSE SERPL-MCNC: 83 MG/DL
HCT VFR BLD AUTO: 38 %
HCT VFR BLD AUTO: 38 %
HGB BLD-MCNC: 12.2 G/DL
HGB BLD-MCNC: 12.2 G/DL
LYMPHOCYTES # BLD AUTO: 1.3 K/UL
LYMPHOCYTES # BLD AUTO: 1.3 K/UL
LYMPHOCYTES NFR BLD: 36.9 %
LYMPHOCYTES NFR BLD: 36.9 %
MCH RBC QN AUTO: 28.8 PG
MCH RBC QN AUTO: 28.8 PG
MCHC RBC AUTO-ENTMCNC: 32.1 %
MCHC RBC AUTO-ENTMCNC: 32.1 %
MCV RBC AUTO: 90 FL
MCV RBC AUTO: 90 FL
MONOCYTES # BLD AUTO: 0.4 K/UL
MONOCYTES # BLD AUTO: 0.4 K/UL
MONOCYTES NFR BLD: 11.5 %
MONOCYTES NFR BLD: 11.5 %
NEUTROPHILS # BLD AUTO: 1.7 K/UL
NEUTROPHILS # BLD AUTO: 1.7 K/UL
NEUTROPHILS NFR BLD: 49 %
NEUTROPHILS NFR BLD: 49 %
PLATELET # BLD AUTO: 197 K/UL
PLATELET # BLD AUTO: 197 K/UL
PMV BLD AUTO: 12.2 FL
PMV BLD AUTO: 12.2 FL
POTASSIUM SERPL-SCNC: 3.9 MMOL/L
PROT SERPL-MCNC: 6.7 G/DL
RBC # BLD AUTO: 4.23 M/UL
RBC # BLD AUTO: 4.23 M/UL
SODIUM SERPL-SCNC: 143 MMOL/L
WBC # BLD AUTO: 3.47 K/UL
WBC # BLD AUTO: 3.47 K/UL

## 2017-05-01 PROCEDURE — 80053 COMPREHEN METABOLIC PANEL: CPT

## 2017-05-01 PROCEDURE — 36415 COLL VENOUS BLD VENIPUNCTURE: CPT | Mod: PO

## 2017-05-01 PROCEDURE — 86140 C-REACTIVE PROTEIN: CPT

## 2017-05-01 PROCEDURE — 85651 RBC SED RATE NONAUTOMATED: CPT | Mod: PO

## 2017-05-01 PROCEDURE — 85025 COMPLETE CBC W/AUTO DIFF WBC: CPT

## 2017-06-27 ENCOUNTER — OFFICE VISIT (OUTPATIENT)
Dept: RHEUMATOLOGY | Facility: CLINIC | Age: 62
End: 2017-06-27
Payer: MEDICARE

## 2017-06-27 ENCOUNTER — LAB VISIT (OUTPATIENT)
Dept: LAB | Facility: HOSPITAL | Age: 62
End: 2017-06-27
Attending: INTERNAL MEDICINE
Payer: MEDICARE

## 2017-06-27 VITALS
DIASTOLIC BLOOD PRESSURE: 80 MMHG | BODY MASS INDEX: 37.08 KG/M2 | SYSTOLIC BLOOD PRESSURE: 139 MMHG | HEART RATE: 63 BPM | HEIGHT: 64 IN | WEIGHT: 217.19 LBS

## 2017-06-27 DIAGNOSIS — D70.9 NEUTROPENIA, UNSPECIFIED TYPE: ICD-10-CM

## 2017-06-27 DIAGNOSIS — M16.0 PRIMARY OSTEOARTHRITIS OF BOTH HIPS: ICD-10-CM

## 2017-06-27 DIAGNOSIS — E03.9 ACQUIRED HYPOTHYROIDISM: ICD-10-CM

## 2017-06-27 DIAGNOSIS — M06.9 RHEUMATOID ARTHRITIS OF HAND, UNSPECIFIED LATERALITY, UNSPECIFIED RHEUMATOID FACTOR PRESENCE: ICD-10-CM

## 2017-06-27 DIAGNOSIS — J32.9 SINUSITIS, UNSPECIFIED CHRONICITY, UNSPECIFIED LOCATION: ICD-10-CM

## 2017-06-27 DIAGNOSIS — M16.9 OSTEOARTHROSIS OF HIP: ICD-10-CM

## 2017-06-27 DIAGNOSIS — D64.9 ANEMIA, UNSPECIFIED TYPE: ICD-10-CM

## 2017-06-27 DIAGNOSIS — M25.552 HIP PAIN, LEFT: ICD-10-CM

## 2017-06-27 DIAGNOSIS — R79.9 ABNORMAL FINDING OF BLOOD CHEMISTRY: ICD-10-CM

## 2017-06-27 DIAGNOSIS — R94.6 ABNORMAL RESULTS OF THYROID FUNCTION STUDIES: ICD-10-CM

## 2017-06-27 DIAGNOSIS — R70.0 ESR RAISED: ICD-10-CM

## 2017-06-27 DIAGNOSIS — M06.9 RHEUMATOID ARTHRITIS OF HAND, UNSPECIFIED LATERALITY, UNSPECIFIED RHEUMATOID FACTOR PRESENCE: Primary | ICD-10-CM

## 2017-06-27 LAB
BASOPHILS # BLD AUTO: 0.03 K/UL
BASOPHILS NFR BLD: 0.5 %
DIFFERENTIAL METHOD: ABNORMAL
EOSINOPHIL # BLD AUTO: 0.2 K/UL
EOSINOPHIL NFR BLD: 2.7 %
ERYTHROCYTE [DISTWIDTH] IN BLOOD BY AUTOMATED COUNT: 13.8 %
HCT VFR BLD AUTO: 37.8 %
HGB BLD-MCNC: 12.2 G/DL
IRON SERPL-MCNC: 98 UG/DL
LYMPHOCYTES # BLD AUTO: 2 K/UL
LYMPHOCYTES NFR BLD: 31.7 %
MCH RBC QN AUTO: 29.2 PG
MCHC RBC AUTO-ENTMCNC: 32.3 %
MCV RBC AUTO: 90 FL
MONOCYTES # BLD AUTO: 0.7 K/UL
MONOCYTES NFR BLD: 10.1 %
NEUTROPHILS # BLD AUTO: 3.5 K/UL
NEUTROPHILS NFR BLD: 55 %
PLATELET # BLD AUTO: 209 K/UL
PMV BLD AUTO: 13.2 FL
RBC # BLD AUTO: 4.18 M/UL
SATURATED IRON: 24 %
T3FREE SERPL-MCNC: 2 PG/ML
T4 FREE SERPL-MCNC: 1.09 NG/DL
TOTAL IRON BINDING CAPACITY: 404 UG/DL
TRANSFERRIN SERPL-MCNC: 273 MG/DL
TSH SERPL DL<=0.005 MIU/L-ACNC: 2.23 UIU/ML
WBC # BLD AUTO: 6.41 K/UL

## 2017-06-27 PROCEDURE — 96372 THER/PROPH/DIAG INJ SC/IM: CPT | Mod: S$GLB,,, | Performed by: INTERNAL MEDICINE

## 2017-06-27 PROCEDURE — 99999 PR PBB SHADOW E&M-EST. PATIENT-LVL III: CPT | Mod: PBBFAC,,, | Performed by: INTERNAL MEDICINE

## 2017-06-27 PROCEDURE — 99499 UNLISTED E&M SERVICE: CPT | Mod: S$GLB,,, | Performed by: INTERNAL MEDICINE

## 2017-06-27 PROCEDURE — 84466 ASSAY OF TRANSFERRIN: CPT

## 2017-06-27 PROCEDURE — 36415 COLL VENOUS BLD VENIPUNCTURE: CPT | Mod: PO

## 2017-06-27 PROCEDURE — 84481 FREE ASSAY (FT-3): CPT

## 2017-06-27 PROCEDURE — 99214 OFFICE O/P EST MOD 30 MIN: CPT | Mod: 25,S$GLB,, | Performed by: INTERNAL MEDICINE

## 2017-06-27 PROCEDURE — 85025 COMPLETE CBC W/AUTO DIFF WBC: CPT

## 2017-06-27 PROCEDURE — 84439 ASSAY OF FREE THYROXINE: CPT

## 2017-06-27 PROCEDURE — 83540 ASSAY OF IRON: CPT

## 2017-06-27 PROCEDURE — 84443 ASSAY THYROID STIM HORMONE: CPT

## 2017-06-27 RX ORDER — AZITHROMYCIN 250 MG/1
TABLET, FILM COATED ORAL
Qty: 6 TABLET | Refills: 0 | Status: SHIPPED | OUTPATIENT
Start: 2017-06-27 | End: 2017-09-14 | Stop reason: ALTCHOICE

## 2017-06-27 RX ORDER — CELECOXIB 200 MG/1
200 CAPSULE ORAL DAILY
COMMUNITY
Start: 2017-06-07 | End: 2018-04-27 | Stop reason: SDUPTHER

## 2017-06-27 RX ORDER — CYANOCOBALAMIN 1000 UG/ML
INJECTION, SOLUTION INTRAMUSCULAR; SUBCUTANEOUS
Refills: 3 | COMMUNITY
Start: 2017-05-10 | End: 2018-04-27

## 2017-06-27 RX ORDER — DIPHENOXYLATE HYDROCHLORIDE AND ATROPINE SULFATE 2.5; .025 MG/1; MG/1
TABLET ORAL
COMMUNITY
Start: 2017-06-02 | End: 2017-09-18

## 2017-06-27 RX ORDER — LEFLUNOMIDE 20 MG/1
20 TABLET ORAL DAILY
Qty: 30 TABLET | Refills: 6 | Status: SHIPPED | OUTPATIENT
Start: 2017-06-27 | End: 2018-04-27 | Stop reason: SDUPTHER

## 2017-06-27 RX ORDER — AZITHROMYCIN 250 MG/1
TABLET, FILM COATED ORAL
Qty: 6 TABLET | Refills: 0 | Status: SHIPPED | OUTPATIENT
Start: 2017-06-27 | End: 2017-06-27 | Stop reason: SDUPTHER

## 2017-06-27 RX ORDER — ACETAMINOPHEN 500 MG
2 TABLET ORAL
COMMUNITY
End: 2017-09-14 | Stop reason: SDUPTHER

## 2017-06-27 RX ORDER — DEXAMETHASONE SODIUM PHOSPHATE 4 MG/ML
8 INJECTION, SOLUTION INTRA-ARTICULAR; INTRALESIONAL; INTRAMUSCULAR; INTRAVENOUS; SOFT TISSUE
Status: COMPLETED | OUTPATIENT
Start: 2017-06-27 | End: 2017-06-27

## 2017-06-27 RX ADMIN — DEXAMETHASONE SODIUM PHOSPHATE 8 MG: 4 INJECTION, SOLUTION INTRA-ARTICULAR; INTRALESIONAL; INTRAMUSCULAR; INTRAVENOUS; SOFT TISSUE at 12:06

## 2017-06-27 NOTE — PROGRESS NOTES
Administered 8mg (2cc) of dexamethasone to right upper outer quadrant of gluteus corrie. Patient tolerated well.        ZUHAIR Reynoso

## 2017-06-27 NOTE — PROGRESS NOTES
Subjective:       Patient ID: Vida Ornelas is a 62 y.o. female.    Chief Complaint: Rheumatoid Arthritis (3 month follow up)    RA: on arava, and medrol dose pack.she had sever lower back pain, L si joint pain and hip pain.  She a si joint injection and hip injection with no releif Patient complains of arthralgias and myalgias for which has been present for a few years. Pain is located in multiple joints, both shoulder(s), both elbow(s), both wrist(s), both MCP(s): 1st, 2nd, 3rd, 4th and 5th, both PIP(s): 1st, 2nd, 3rd, 4th and 5th, both DIP(s): 1st and 2nd, both hip(s), both knee(s) and both MTP(s): 1st, 2nd, 3rd, 4th and 5th, is described as aching, pulsating, shooting and throbbing, and is constant, moderate .  Associated symptoms include: crepitation, decreased range of motion, edema, effusion, tenderness and warmth.                 Associated symptoms include fatigue.     Her past medical history is significant for osteoarthritis.           The symptoms have been improving. Affected locations include the neck, right MCP, right PIP, right DIP, left PIP, left DIP, left MCP and left wrist. Associated symptoms include fatigue.     Her past medical history is significant for osteoarthritis. Factors aggravating her arthritis include activity.           Associated symptoms include fatigue.     Her past medical history is significant for osteoarthritis.   Hip Pain   Associated symptoms include arthralgias, fatigue and neck pain. Pertinent negatives include no abdominal pain, anorexia, change in bowel habit, chest pain, chills, congestion, coughing, diaphoresis, nausea, numbness, rash, sore throat, vomiting or weakness.   Osteoarthritis   Associated symptoms include arthralgias, fatigue and neck pain. Pertinent negatives include no abdominal pain, anorexia, change in bowel habit, chest pain, chills, congestion, coughing, diaphoresis, nausea, numbness, rash, sore throat, vomiting or weakness.     Review of Systems  "  Constitutional: Positive for fatigue. Negative for activity change, appetite change, chills, diaphoresis and unexpected weight change.   HENT: Negative for congestion, dental problem, ear discharge, ear pain, facial swelling, mouth sores, nosebleeds, postnasal drip, rhinorrhea, sinus pressure, sneezing, sore throat, tinnitus and voice change.    Eyes: Negative for photophobia, pain, discharge, redness and itching.   Respiratory: Negative for apnea, cough, chest tightness, shortness of breath and wheezing.    Cardiovascular: Negative for chest pain, palpitations and leg swelling.   Gastrointestinal: Negative for abdominal distention, abdominal pain, anorexia, change in bowel habit, constipation, diarrhea, nausea and vomiting.   Endocrine: Negative for cold intolerance, heat intolerance, polydipsia and polyuria.   Genitourinary: Negative for decreased urine volume, difficulty urinating, flank pain, frequency, hematuria and urgency.   Musculoskeletal: Positive for arthralgias and neck pain. Negative for back pain, gait problem and neck stiffness.   Skin: Negative for pallor, rash and wound.   Allergic/Immunologic: Negative for immunocompromised state.   Neurological: Negative for dizziness, tremors, weakness and numbness.   Hematological: Negative for adenopathy. Does not bruise/bleed easily.   Psychiatric/Behavioral: Negative for sleep disturbance. The patient is not nervous/anxious.          Objective:     /80   Pulse 63   Ht 5' 4" (1.626 m)   Wt 98.5 kg (217 lb 3.2 oz)   BMI 37.28 kg/m²      Physical Exam   Nursing note and vitals reviewed.  Constitutional: She is oriented to person, place, and time and well-developed, well-nourished, and in no distress.   HENT:   Head: Normocephalic and atraumatic.   Mouth/Throat: Oropharynx is clear and moist.   Eyes: EOM are normal. Pupils are equal, round, and reactive to light.   Neck: Neck supple. No thyromegaly present.   Cardiovascular: Normal rate, regular " rhythm and normal heart sounds.  Exam reveals no gallop and no friction rub.    No murmur heard.  Pulmonary/Chest: Breath sounds normal. She has no wheezes. She has no rales. She exhibits no tenderness.   Abdominal: There is no tenderness. There is no rebound and no guarding.       Right Side Rheumatological Exam     The patient is tender to palpation of the shoulder, wrist, knee, 1st PIP, 1st MCP, 2nd PIP, 2nd MCP, 3rd PIP, 3rd MCP, 4th PIP, 4th MCP, 5th PIP and 5th MCP    She has swelling of the shoulder, elbow, wrist and knee    Shoulder Exam   Tenderness Location: no tenderness    Range of Motion   Active Abduction: abnormal   Adduction: abnormal  Sensation: normal    Knee Exam   Patellofemoral Crepitus: positive  Effusion: positive  Sensation: normal    Hip Exam   Tenderness Location: posterior and anterior    Range of Motion   Internal Rotation: abnormal   External Rotation: abnormal   Sensation: normal    Elbow/Wrist Exam   Tenderness Location: no tenderness  Sensation: normal    Left Side Rheumatological Exam     Examination finds the elbow normal.    The patient is tender to palpation of the wrist, knee, 1st PIP, 1st MCP, 2nd PIP, 2nd MCP, 3rd PIP, 3rd MCP, 4th PIP, 4th MCP, 5th PIP and 5th MCP.    She has swelling of the shoulder, wrist and knee    Shoulder Exam   Tenderness Location: no tenderness    Range of Motion   Active Abduction: abnormal   Sensation: normal    Knee Exam     Patellofemoral Crepitus: negative  Effusion: negative  Sensation: normal    Hip Exam   Tenderness Location: no tenderness  Sensation: normal    Elbow/Wrist Exam   Sensation: normal      Back/Neck Exam   Tenderness Right paramedian tenderness of the Lower C-Spine and Upper C-Spine.Left paramedian tenderness of the Lower C-Spine and Upper C-Spine.      Lymphadenopathy:     She has no cervical adenopathy.   Neurological: She is alert and oriented to person, place, and time. Gait normal.   Skin: No rash noted. No erythema. No  pallor.     Psychiatric: Mood and affect normal.   Musculoskeletal: She exhibits edema, tenderness and deformity.          Results for orders placed or performed in visit on 05/01/17   CBC auto differential   Result Value Ref Range    WBC 3.47 (L) 3.90 - 12.70 K/uL    RBC 4.23 4.00 - 5.40 M/uL    Hemoglobin 12.2 12.0 - 16.0 g/dL    Hematocrit 38.0 37.0 - 48.5 %    MCV 90 82 - 98 fL    MCH 28.8 27.0 - 31.0 pg    MCHC 32.1 32.0 - 36.0 %    RDW 13.7 11.5 - 14.5 %    Platelets 197 150 - 350 K/uL    MPV 12.2 9.2 - 12.9 fL    Gran # 1.7 (L) 1.8 - 7.7 K/uL    Lymph # 1.3 1.0 - 4.8 K/uL    Mono # 0.4 0.3 - 1.0 K/uL    Eos # 0.1 0.0 - 0.5 K/uL    Baso # 0.02 0.00 - 0.20 K/uL    Gran% 49.0 38.0 - 73.0 %    Lymph% 36.9 18.0 - 48.0 %    Mono% 11.5 4.0 - 15.0 %    Eosinophil% 1.7 0.0 - 8.0 %    Basophil% 0.6 0.0 - 1.9 %    Differential Method Automated    Comprehensive metabolic panel   Result Value Ref Range    Sodium 143 136 - 145 mmol/L    Potassium 3.9 3.5 - 5.1 mmol/L    Chloride 109 95 - 110 mmol/L    CO2 28 23 - 29 mmol/L    Glucose 83 70 - 110 mg/dL    BUN, Bld 15 6 - 20 mg/dL    Creatinine 0.7 0.5 - 1.4 mg/dL    Calcium 9.3 8.7 - 10.5 mg/dL    Total Protein 6.7 6.0 - 8.4 g/dL    Albumin 3.3 (L) 3.5 - 5.2 g/dL    Total Bilirubin 0.4 0.1 - 1.0 mg/dL    Alkaline Phosphatase 78 55 - 135 U/L    AST 18 10 - 40 U/L    ALT 18 10 - 44 U/L    Anion Gap 6 (L) 8 - 16 mmol/L    eGFR if African American >60.0 >60 mL/min/1.73 m^2    eGFR if non African American >60.0 >60 mL/min/1.73 m^2   CBC auto differential   Result Value Ref Range    WBC 3.47 (L) 3.90 - 12.70 K/uL    RBC 4.23 4.00 - 5.40 M/uL    Hemoglobin 12.2 12.0 - 16.0 g/dL    Hematocrit 38.0 37.0 - 48.5 %    MCV 90 82 - 98 fL    MCH 28.8 27.0 - 31.0 pg    MCHC 32.1 32.0 - 36.0 %    RDW 13.7 11.5 - 14.5 %    Platelets 197 150 - 350 K/uL    MPV 12.2 9.2 - 12.9 fL    Gran # 1.7 (L) 1.8 - 7.7 K/uL    Lymph # 1.3 1.0 - 4.8 K/uL    Mono # 0.4 0.3 - 1.0 K/uL    Eos # 0.1 0.0 - 0.5  K/uL    Baso # 0.02 0.00 - 0.20 K/uL    Gran% 49.0 38.0 - 73.0 %    Lymph% 36.9 18.0 - 48.0 %    Mono% 11.5 4.0 - 15.0 %    Eosinophil% 1.7 0.0 - 8.0 %    Basophil% 0.6 0.0 - 1.9 %    Differential Method Automated    C-reactive protein   Result Value Ref Range    CRP 3.0 0.0 - 8.2 mg/L   Sedimentation rate, manual   Result Value Ref Range    Sed Rate 46 (H) 0 - 20 mm/Hr         Assessment:       Encounter Diagnoses   Name Primary?    Rheumatoid arthritis of hand, unspecified laterality, unspecified rheumatoid factor presence Yes    Neutropenia, unspecified type     ESR raised     Sinusitis, unspecified chronicity, unspecified location     Abnormal results of thyroid function studies      Abnormal finding of blood chemistry      Anemia, unspecified type     Hip pain, left     Osteoarthrosis of hip     Acquired hypothyroidism     Primary osteoarthritis of both hips          Plan:     Rheumatoid arthritis of hand, unspecified laterality, unspecified rheumatoid factor presence  -     CBC auto differential; Future; Expected date: 06/27/2017  -     TSH; Future; Expected date: 06/27/2017  -     T4, free; Future; Expected date: 06/27/2017  -     T3, free; Future; Expected date: 06/27/2017  -     Iron and TIBC; Future; Expected date: 06/27/2017  -     Discontinue: azithromycin (Z-ALTAGRACIA) 250 MG tablet; Take 2 tablets by mouth on day 1; Take 1 tablet by mouth on days 2-5  HOLD ARAVA  Dispense: 6 tablet; Refill: 0  -     dexamethasone injection 8 mg; Inject 2 mLs (8 mg total) into the muscle one time.  -     azithromycin (Z-ALTAGRACIA) 250 MG tablet; Take 2 tablets by mouth on day 1; Take 1 tablet by mouth on days 2-5  HOLD ARAVA  Dispense: 6 tablet; Refill: 0  -     leflunomide (ARAVA) 20 MG Tab; Take 1 tablet (20 mg total) by mouth once daily.  Dispense: 30 tablet; Refill: 6    Neutropenia, unspecified type  -     CBC auto differential; Future; Expected date: 06/27/2017  -     TSH; Future; Expected date: 06/27/2017  -      T4, free; Future; Expected date: 06/27/2017  -     T3, free; Future; Expected date: 06/27/2017  -     Iron and TIBC; Future; Expected date: 06/27/2017  -     Discontinue: azithromycin (Z-ALTAGRACIA) 250 MG tablet; Take 2 tablets by mouth on day 1; Take 1 tablet by mouth on days 2-5  HOLD ARAVA  Dispense: 6 tablet; Refill: 0  -     dexamethasone injection 8 mg; Inject 2 mLs (8 mg total) into the muscle one time.  -     azithromycin (Z-ALTAGRACIA) 250 MG tablet; Take 2 tablets by mouth on day 1; Take 1 tablet by mouth on days 2-5  HOLD ARAVA  Dispense: 6 tablet; Refill: 0    ESR raised  -     CBC auto differential; Future; Expected date: 06/27/2017  -     TSH; Future; Expected date: 06/27/2017  -     T4, free; Future; Expected date: 06/27/2017  -     T3, free; Future; Expected date: 06/27/2017  -     Iron and TIBC; Future; Expected date: 06/27/2017  -     Discontinue: azithromycin (Z-ALTAGRACIA) 250 MG tablet; Take 2 tablets by mouth on day 1; Take 1 tablet by mouth on days 2-5  HOLD ARAVA  Dispense: 6 tablet; Refill: 0  -     dexamethasone injection 8 mg; Inject 2 mLs (8 mg total) into the muscle one time.  -     azithromycin (Z-ALTAGRACIA) 250 MG tablet; Take 2 tablets by mouth on day 1; Take 1 tablet by mouth on days 2-5  HOLD ARAVA  Dispense: 6 tablet; Refill: 0  -     leflunomide (ARAVA) 20 MG Tab; Take 1 tablet (20 mg total) by mouth once daily.  Dispense: 30 tablet; Refill: 6    Sinusitis, unspecified chronicity, unspecified location  -     CBC auto differential; Future; Expected date: 06/27/2017  -     TSH; Future; Expected date: 06/27/2017  -     T4, free; Future; Expected date: 06/27/2017  -     T3, free; Future; Expected date: 06/27/2017  -     Iron and TIBC; Future; Expected date: 06/27/2017  -     Discontinue: azithromycin (Z-ALTAGRACIA) 250 MG tablet; Take 2 tablets by mouth on day 1; Take 1 tablet by mouth on days 2-5  HOLD ARAVA  Dispense: 6 tablet; Refill: 0  -     dexamethasone injection 8 mg; Inject 2 mLs (8 mg total) into the muscle  one time.  -     azithromycin (Z-ALTAGRACIA) 250 MG tablet; Take 2 tablets by mouth on day 1; Take 1 tablet by mouth on days 2-5  HOLD ARAVA  Dispense: 6 tablet; Refill: 0    Abnormal results of thyroid function studies   -     CBC auto differential; Future; Expected date: 06/27/2017  -     TSH; Future; Expected date: 06/27/2017  -     T4, free; Future; Expected date: 06/27/2017  -     T3, free; Future; Expected date: 06/27/2017  -     Iron and TIBC; Future; Expected date: 06/27/2017  -     Discontinue: azithromycin (Z-ALTAGRACIA) 250 MG tablet; Take 2 tablets by mouth on day 1; Take 1 tablet by mouth on days 2-5  HOLD ARAVA  Dispense: 6 tablet; Refill: 0  -     dexamethasone injection 8 mg; Inject 2 mLs (8 mg total) into the muscle one time.  -     azithromycin (Z-ALTAGRACIA) 250 MG tablet; Take 2 tablets by mouth on day 1; Take 1 tablet by mouth on days 2-5  HOLD ARAVA  Dispense: 6 tablet; Refill: 0    Abnormal finding of blood chemistry   -     Iron and TIBC; Future; Expected date: 06/27/2017    Anemia, unspecified type    Hip pain, left  -     leflunomide (ARAVA) 20 MG Tab; Take 1 tablet (20 mg total) by mouth once daily.  Dispense: 30 tablet; Refill: 6    Osteoarthrosis of hip  -     leflunomide (ARAVA) 20 MG Tab; Take 1 tablet (20 mg total) by mouth once daily.  Dispense: 30 tablet; Refill: 6    Acquired hypothyroidism  -     leflunomide (ARAVA) 20 MG Tab; Take 1 tablet (20 mg total) by mouth once daily.  Dispense: 30 tablet; Refill: 6    Primary osteoarthritis of both hips  -     leflunomide (ARAVA) 20 MG Tab; Take 1 tablet (20 mg total) by mouth once daily.  Dispense: 30 tablet; Refill: 6

## 2017-08-31 ENCOUNTER — TELEPHONE (OUTPATIENT)
Dept: INTERNAL MEDICINE | Facility: CLINIC | Age: 62
End: 2017-08-31

## 2017-08-31 RX ORDER — LEVOTHYROXINE SODIUM 25 UG/1
TABLET ORAL
Qty: 30 TABLET | Refills: 1 | Status: SHIPPED | OUTPATIENT
Start: 2017-08-31 | End: 2017-10-13 | Stop reason: SDUPTHER

## 2017-08-31 NOTE — TELEPHONE ENCOUNTER
----- Message from Jennifer Rodriguez sent at 8/31/2017  2:49 PM CDT -----  Contact: [phone]- [phone]  Patient states she is returning the nurse phone call.Thanks!

## 2017-09-07 ENCOUNTER — TELEPHONE (OUTPATIENT)
Dept: GASTROENTEROLOGY | Facility: CLINIC | Age: 62
End: 2017-09-07

## 2017-09-07 ENCOUNTER — TELEPHONE (OUTPATIENT)
Dept: RHEUMATOLOGY | Facility: CLINIC | Age: 62
End: 2017-09-07

## 2017-09-07 DIAGNOSIS — K21.9 GASTROESOPHAGEAL REFLUX DISEASE, ESOPHAGITIS PRESENCE NOT SPECIFIED: ICD-10-CM

## 2017-09-07 NOTE — TELEPHONE ENCOUNTER
----- Message from Baljinder Kenyon MD sent at 9/5/2017  5:08 PM CDT -----  Your results are essential normal. Continue your present medications. Will will adjust medicatiions at your next visit.

## 2017-09-07 NOTE — TELEPHONE ENCOUNTER
Please inform the patient that I refilled the prescription for zantac and she is due for a follow-up visit (last seen over a year ago) for continued evaluation and management.  Thanks  BENEDICT

## 2017-09-07 NOTE — TELEPHONE ENCOUNTER
Spoke with pt. Informed of results & recommendations per Dr. Kenyon. Pt verbalized understanding.

## 2017-09-14 ENCOUNTER — OFFICE VISIT (OUTPATIENT)
Dept: FAMILY MEDICINE | Facility: CLINIC | Age: 62
End: 2017-09-14
Payer: MEDICARE

## 2017-09-14 VITALS
BODY MASS INDEX: 35.95 KG/M2 | HEART RATE: 68 BPM | HEIGHT: 64 IN | SYSTOLIC BLOOD PRESSURE: 123 MMHG | DIASTOLIC BLOOD PRESSURE: 70 MMHG | WEIGHT: 210.56 LBS

## 2017-09-14 DIAGNOSIS — K21.9 GASTROESOPHAGEAL REFLUX DISEASE, ESOPHAGITIS PRESENCE NOT SPECIFIED: ICD-10-CM

## 2017-09-14 DIAGNOSIS — Z00.00 ENCOUNTER FOR PREVENTIVE HEALTH EXAMINATION: Primary | ICD-10-CM

## 2017-09-14 DIAGNOSIS — M06.9 RHEUMATOID ARTHRITIS INVOLVING MULTIPLE SITES, UNSPECIFIED RHEUMATOID FACTOR PRESENCE: ICD-10-CM

## 2017-09-14 DIAGNOSIS — M16.9 OSTEOARTHROSIS OF HIP: ICD-10-CM

## 2017-09-14 DIAGNOSIS — E55.9 VITAMIN D DEFICIENCY: ICD-10-CM

## 2017-09-14 DIAGNOSIS — E03.9 ACQUIRED HYPOTHYROIDISM: ICD-10-CM

## 2017-09-14 DIAGNOSIS — M25.50 MULTIPLE JOINT PAIN: ICD-10-CM

## 2017-09-14 DIAGNOSIS — M25.60 STIFFNESS IN JOINT: ICD-10-CM

## 2017-09-14 DIAGNOSIS — R70.0 ESR RAISED: ICD-10-CM

## 2017-09-14 DIAGNOSIS — Z98.890 HISTORY OF ESOPHAGEAL DILATATION: ICD-10-CM

## 2017-09-14 PROCEDURE — 99999 PR PBB SHADOW E&M-EST. PATIENT-LVL V: CPT | Mod: PBBFAC,,, | Performed by: NURSE PRACTITIONER

## 2017-09-14 PROCEDURE — G0439 PPPS, SUBSEQ VISIT: HCPCS | Mod: S$GLB,,, | Performed by: NURSE PRACTITIONER

## 2017-09-14 PROCEDURE — 99499 UNLISTED E&M SERVICE: CPT | Mod: S$GLB,,, | Performed by: NURSE PRACTITIONER

## 2017-09-14 RX ORDER — SUCRALFATE 1 G/1
1 TABLET ORAL
Status: ON HOLD | COMMUNITY
Start: 2017-08-31 | End: 2017-10-03

## 2017-09-14 NOTE — PATIENT INSTRUCTIONS
Counseling and Referral of Other Preventative  (Italic type indicates deductible and co-insurance are waived)    Patient Name: Vida Ornelas  Today's Date: 9/14/2017      SERVICE LIMITATIONS RECOMMENDATION    Vaccines    · Pneumococcal (once after 65)    · Influenza (annually)    · Hepatitis B (if medium/high risk)    · Prevnar 13      Hepatitis B medium/high risk factors:       - End-stage renal disease       - Hemophiliacs who received Factor VII or         IX concentrates       - Clients of institutions for the mentally             retarded       - Persons who live in the same house as          a HepB carrier       - Homosexual men       - Illicit injectable drug abusers     Pneumococcal: N/A     Influenza: N/A     Hepatitis B: N/A     Prevnar 13: N/A    Mammogram (biennial age 50-74)  Annually (age 40 or over)  Last done 2015, recommend to repeat every 1  years pt reports having m ammo at [facility] in 2016, pt will provide records    Pap (up to age 70 and after 70 if unknown history or abnormal study last 10 years)    Last done 2016, recommend to repeat every 3  years     The USPSTF recommends screening for cervical cancer in women age 21 to 65 years with cytology (Pap smear) every 3 years.  and The USPSTF recommends against screening for cervical cancer in women who have had a hysterectomy with removal of the cervix and who do not have a history of a high-grade precancerous lesion (cervical intraepithelial neoplasia [SADE] grade 2 or 3) or cervical cancer.     Colorectal cancer screening (to age 75)    · Fecal occult blood test (annual)  · Flexible sigmoidoscopy (5y)  · Screening colonoscopy (10y)  · Barium enema   Last done 2015, recommend to repeat every 6  years    Diabetes self-management training (no USPSTF recommendations)  Requires referral by treating physician for patient with diabetes or renal disease. 10 hours of initial DSMT sessions of no less than 30 minutes each in a continuous 12-month  period. 2 hours of follow-up DSMT in subsequent years.  N/A    Bone mass measurements (age 65 & older, biennial)  Requires diagnosis related to osteoporosis or estrogen deficiency. Biennial benefit unless patient has history of long-term glucocorticoid  Last done 2015, recommend to repeat every 2  years    Glaucoma screening (no USPSTF recommendation)  Diabetes mellitus, family history   , age 50 or over    American, age 65 or over  Recommend follow up with eye care professional regularly    Medical nutrition therapy for diabetes or renal disease (no recommended schedule)  Requires referral by treating physician for patient with diabetes or renal disease or kidney transplant within the past 3 years.  Can be provided in same year as diabetes self-management training (DSMT), and CMS recommends medical nutrition therapy take place after DSMT. Up to 3 hours for initial year and 2 hours in subsequent years.  N/A    Cardiovascular screening blood tests (every 5 years)  · Fasting lipid panel  Order as a panel if possible  Done this year, repeat every year    Diabetes screening tests (at least every 3 years, Medicare covers annually or at 6-month intervals for prediabetic patients)  · Fasting blood sugar (FBS) or glucose tolerance test (GTT)  Patient must be diagnosed with one of the following:       - Hypertension       - Dyslipidemia       - Obesity (BMI 30kg/m2)       - Previous elevated impaired FBS or GTT       ... or any two of the following:       - Overweight (BMI 25 but <30)       - Family history of diabetes       - Age 65 or older       - History of gestational diabetes or birth of baby weighing more than 9 pounds  Done this year, repeat every year    HIV screening (annually for increased risk patients)  · HIV-1 and HIV-2 by EIA, or ZANDRA, rapid antibody test or oral mucosa transudate  Patients must be at increased risk for HIV infection per USPSTF guidelines or pregnant. Tests covered  annually for patient at increased risk or as requested by the patient. Pregnant patients may receive up to 3 tests during pregnancy.  Risks discussed, screening is not recommended    Smoking cessation counseling (up to 8 sessions per year)  Patients must be asymptomatic of tobacco-related conditions to receive as a preventative service.  Non-smoker    Subsequent annual wellness visit  At least 12 months since last AWV  Return in one year     The following information is provided to all patients.  This information is to help you find resources for any of the problems found today that may be affecting your health:                Living healthy guide: www.[facility].[place].[facility]      Understanding Diabetes: www.diabetes.org      Eating healthy: www.cdc.gov/healthyweight      CDC home safety checklist: www.cdc.gov/steadi/patient.html      Agency on Aging: www.goea.[place].gov      Alcoholics anonymous (AA): www.aa.org      Physical Activity: www.bri.nih.gov/ie8zkmh      Tobacco use: www.quitwithusla.org

## 2017-09-14 NOTE — PROGRESS NOTES
"Vida Ornelas presented for a  Medicare AWV and comprehensive Health Risk Assessment today. The following components were reviewed and updated:    · Medical history  · Family History  · Social history  · Allergies and Current Medications  · Health Risk Assessment  · Health Maintenance  · Care Team     ** See Completed Assessments for Annual Wellness Visit within the encounter summary.**       The following assessments were completed:  · Living Situation  · CAGE  · Depression Screening  · Timed Get Up and Go  · Whisper Test  · Cognitive Function Screening  · Nutrition Screening  · ADL Screening  · PAQ Screening    Vitals:    09/14/17 1101   BP: 123/70   Pulse: 68   Weight: 95.5 kg (210 lb 8.6 oz)   Height: 5' 4" (1.626 m)     Body mass index is 36.14 kg/m².  Physical Exam   Constitutional: She is oriented to person, place, and time. She appears well-developed and well-nourished. No distress.   HENT:   Head: Normocephalic and atraumatic.   Eyes: No scleral icterus.   Cardiovascular: Normal rate and regular rhythm.  Exam reveals no gallop and no friction rub.    No murmur heard.  Pulmonary/Chest: Effort normal and breath sounds normal. No respiratory distress. She has no wheezes.   Neurological: She is alert and oriented to person, place, and time.   Skin: Skin is warm and dry.   Psychiatric: She has a normal mood and affect. Her behavior is normal. Judgment and thought content normal.   Vitals reviewed.        Diagnoses and health risks identified today and associated recommendations/orders:    1. Encounter for preventive health examination  Reviewed health maintenance and provided recommendations  Pt to discuss vaccinations with Dr. Kenyon  - Hepatitis C antibody; Future  - Ambulatory referral to Optometry    2. Rheumatoid arthritis involving multiple sites, unspecified rheumatoid factor presence  Unchanged.   Taking arava and celebrex  Followed by Chantale.       3. Acquired hypothyroidism  Continue to monitor " tsh  Taking levothyroxine  Followed by Soledad Leone MD .       4. ESR raised  Continue to monitor     Followed by Chantale.       5. Vitamin D deficiency  Continue to monitor Vit d levels  Followed by Chantale.       6. History of esophageal dilatation  Recommend f/u with GI  Followed by Alex.       7. Gastroesophageal reflux disease, esophagitis presence not specified  Continue carafate and zantac  Followed by Alex.       8. Osteoarthrosis of hip  Stable.   Taking celebrex  Followed by Chantale.       9. Multiple joint pain  Stable.   Taking celebrex  Followed by Chantale.       10. Stiffness in joint  Stable.   Taking celebrex, contiue H2o therapy  Followed by Chantale.         Provided Vida with a 5-10 year written screening schedule and personal prevention plan. Recommendations were developed using the USPSTF age appropriate recommendations. Education, counseling, and referrals were provided as needed. After Visit Summary printed and given to patient which includes a list of additional screenings\tests needed.    Return in about 1 year (around 9/14/2018).    Jayna Rodriguez NP

## 2017-09-18 ENCOUNTER — OFFICE VISIT (OUTPATIENT)
Dept: PRIMARY CARE CLINIC | Facility: CLINIC | Age: 62
End: 2017-09-18
Payer: MEDICARE

## 2017-09-18 ENCOUNTER — TELEPHONE (OUTPATIENT)
Dept: GASTROENTEROLOGY | Facility: CLINIC | Age: 62
End: 2017-09-18

## 2017-09-18 VITALS
SYSTOLIC BLOOD PRESSURE: 136 MMHG | BODY MASS INDEX: 35.8 KG/M2 | HEART RATE: 64 BPM | DIASTOLIC BLOOD PRESSURE: 94 MMHG | HEIGHT: 64 IN | WEIGHT: 209.69 LBS

## 2017-09-18 DIAGNOSIS — K58.0 IRRITABLE BOWEL SYNDROME WITH DIARRHEA: ICD-10-CM

## 2017-09-18 DIAGNOSIS — R09.A2 GLOBUS SENSATION: ICD-10-CM

## 2017-09-18 DIAGNOSIS — R19.5 MUCUS IN STOOL: ICD-10-CM

## 2017-09-18 DIAGNOSIS — R20.0 NUMBNESS AND TINGLING IN LEFT ARM: ICD-10-CM

## 2017-09-18 DIAGNOSIS — R20.2 NUMBNESS AND TINGLING IN LEFT ARM: ICD-10-CM

## 2017-09-18 DIAGNOSIS — K21.9 GASTROESOPHAGEAL REFLUX DISEASE, ESOPHAGITIS PRESENCE NOT SPECIFIED: Primary | ICD-10-CM

## 2017-09-18 PROCEDURE — 3080F DIAST BP >= 90 MM HG: CPT | Mod: S$GLB,,, | Performed by: NURSE PRACTITIONER

## 2017-09-18 PROCEDURE — 93005 ELECTROCARDIOGRAM TRACING: CPT | Mod: S$GLB,,, | Performed by: NURSE PRACTITIONER

## 2017-09-18 PROCEDURE — 99999 PR PBB SHADOW E&M-EST. PATIENT-LVL IV: CPT | Mod: PBBFAC,,, | Performed by: NURSE PRACTITIONER

## 2017-09-18 PROCEDURE — 99214 OFFICE O/P EST MOD 30 MIN: CPT | Mod: S$GLB,,, | Performed by: NURSE PRACTITIONER

## 2017-09-18 PROCEDURE — 3008F BODY MASS INDEX DOCD: CPT | Mod: S$GLB,,, | Performed by: NURSE PRACTITIONER

## 2017-09-18 PROCEDURE — 99499 UNLISTED E&M SERVICE: CPT | Mod: S$GLB,,, | Performed by: NURSE PRACTITIONER

## 2017-09-18 PROCEDURE — 3075F SYST BP GE 130 - 139MM HG: CPT | Mod: S$GLB,,, | Performed by: NURSE PRACTITIONER

## 2017-09-18 PROCEDURE — 93010 ELECTROCARDIOGRAM REPORT: CPT | Mod: S$GLB,,, | Performed by: INTERNAL MEDICINE

## 2017-09-18 RX ORDER — DICYCLOMINE HYDROCHLORIDE 10 MG/1
10 CAPSULE ORAL EVERY 6 HOURS PRN
COMMUNITY
End: 2018-04-27

## 2017-09-18 RX ORDER — OMEPRAZOLE 40 MG/1
40 CAPSULE, DELAYED RELEASE ORAL DAILY
Qty: 30 CAPSULE | Refills: 0 | Status: SHIPPED | OUTPATIENT
Start: 2017-09-18 | End: 2017-09-25 | Stop reason: SDUPTHER

## 2017-09-18 NOTE — MEDICAL/APP STUDENT
Subjective:       Patient ID: Vida Ornelas is a 62 y.o. female.    Chief Complaint: Dizziness (low BP )  Ms. Ornelas presents today with a variety of issues. Her main concerns today are her stomach issues and her left arm tingling. Significant history includes GERD with dilation of stricture. She is followed by GI and has a follow up appointment in October. Today she presents with a 2 week history of abdominal swelling, gas, bloating, burning sensation after eating. Her diarrhea is unchanged, however, she report mucous in her stools. Of note, she does report significant feeling of food lodged in her neck/throat.    Left arm tingling began 2 weeks ago when she started having increased stomach issues.The tingling does not radiate up her neck or into her back/chest and she denies weakness or loss of motion. It is aggravated when she lays down at night.       Abdominal Pain   This is a new problem. The current episode started 1 to 4 weeks ago. The onset quality is gradual. The problem occurs daily. The problem has been gradually worsening. The pain is located in the epigastric region and generalized abdominal region. The quality of the pain is aching, burning and a sensation of fullness. The abdominal pain radiates to the left shoulder (left shoulder and arm). Associated symptoms include arthralgias (Chronic), belching, diarrhea, flatus, frequency (Night time) and myalgias. Pertinent negatives include no anorexia, constipation, dysuria, fever, headaches, hematochezia, hematuria, nausea, vomiting or weight loss. The pain is aggravated by eating. The pain is relieved by nothing. She has tried antacids and H2 blockers for the symptoms. The treatment provided no relief. Her past medical history is significant for GERD. There is no history of colon cancer or Crohn's disease.     Review of Systems   Constitutional: Negative for fever and weight loss.   Eyes: Negative for photophobia and visual disturbance.  "  Respiratory: Positive for choking. Negative for cough, chest tightness and shortness of breath.    Cardiovascular: Negative for leg swelling.   Gastrointestinal: Positive for abdominal pain, diarrhea and flatus. Negative for anorexia, constipation, hematochezia, nausea and vomiting.   Endocrine: Negative for polydipsia.   Genitourinary: Positive for frequency (Night time). Negative for dysuria, flank pain, hematuria and urgency.   Musculoskeletal: Positive for arthralgias (Chronic), myalgias and neck pain.   Skin: Negative for color change, rash and wound.   Neurological: Negative for facial asymmetry, numbness and headaches.       Objective:      Physical Exam   Constitutional: She is oriented to person, place, and time. She appears well-developed and well-nourished. No distress.   HENT:   Head: Normocephalic.   Eyes: Pupils are equal, round, and reactive to light.   Cardiovascular: Normal rate and normal heart sounds.    Pulmonary/Chest: Effort normal and breath sounds normal.   Abdominal: Soft. She exhibits distension. She exhibits no pulsatile liver, no pulsatile midline mass and no mass. Bowel sounds are increased. There is tenderness in the epigastric area. There is no rigidity, no rebound, no guarding and no CVA tenderness. No hernia.   Neurological: She is alert and oriented to person, place, and time.   Skin: Skin is warm. Capillary refill takes less than 2 seconds. She is not diaphoretic.   Vitals reviewed.      Assessment:       No diagnosis found.    Vitals:    09/18/17 1359   BP: (!) 136/94   Pulse: 64   Weight: 95.1 kg (209 lb 10.5 oz)   Height: 5' 4" (1.626 m)   PainSc:   7   PainLoc: Abdomen       ICD-10-CM ICD-9-CM   1. Gastroesophageal reflux disease, esophagitis presence not specified K21.9 530.81   2. Irritable bowel syndrome with diarrhea K58.0 564.1   3. Mucus in stool R19.5 792.1   4. Globus sensation F45.8 306.4   5. Numbness and tingling in left arm R20.0 782.0    R20.2        Plan:     "   Gastroesophageal reflux disease, esophagitis presence not specified  -     omeprazole (PRILOSEC) 40 MG capsule; Take 1 capsule (40 mg total) by mouth once daily.  Dispense: 30 capsule; Refill: 0    Irritable bowel syndrome with diarrhea    Mucus in stool  Comments:  present since her acid and abd pain worsened    Globus sensation    Numbness and tingling in left arm  -     EKG 12-lead

## 2017-09-18 NOTE — TELEPHONE ENCOUNTER
Spoke with pt, states her bp has been low, tingling down her left arm, feels like food stays in her throat with some burning in her stomach. Scheduled appt with NP in [facility] med

## 2017-09-18 NOTE — Clinical Note
Nicolle Leone and Chantale,  I saw your patient today in the [facility].  If you have any questions, please do not hesitate to contact me.  Thank you!  PEGGY Chavarria

## 2017-09-18 NOTE — TELEPHONE ENCOUNTER
----- Message from Alberto Rivera sent at 9/18/2017  8:21 AM CDT -----  Contact: patient  Patient called requesting same day appointment,check no availability. Patient stated that she feels like something in her throat when eating. Not digesting. Please call back at [phone]. Thanks,

## 2017-09-18 NOTE — PATIENT INSTRUCTIONS
Continue the ranitidine in the evening at bedtime, carafate 1 gm with each meal.  Add omeprazole 40 mg daily.  Continue regular use of the dicyclomine as directed.    Recommendations for reduction of or relief from reflux/heartburn symptoms are below.  Avoid:  Caffeine  Eating less than 1.5 hrs before bedtime.  Mints  Chocolates  Alcohol  Smoking  Any food that increases the abdominal pain.    Eat smaller, more frequent meals.    Take the omeprazole daily.  It is best to take it 30 minutes prior to eating in the morning.  And continue the ranitidine at bedtime.     Discuss the celebrex and the mucus and diarrhea with Dr. Kenyon.    GI referral back to [facility]/Dr. Johnson.    Sleep:  Take the benadryl or melatonin at bedtime several nights in a row.    For the tense muscle in your shoulder:  Muscle rubs (Like Voltaren gel.), warm shower, gentle stretching.    Thank you for using the [facility]!    Safia Benítez, APRN, CNP, FNP-BC  [facility]  [facility]

## 2017-09-20 NOTE — PROGRESS NOTES
Subjective:       Patient ID: Vida Ornelas is a 62 y.o. female.     Chief Complaint: Dizziness (low BP )  Ms. Ornelas presents today with a variety of issues. Her main concerns today are her stomach issues and her left arm tingling. Significant history includes GERD with dilation of stricture. She is followed by GI and has a follow up appointment in October. Today she presents with a 2 week history of abdominal swelling, gas, bloating, burning sensation after eating. Her diarrhea is unchanged, however, she report mucous in her stools. Of note, she does report significant feeling of food lodged in her neck/throat.  She is being treated for rheumatoid arthritis by taking celebrex.  She has not mentioned the mucus in her stool to her rheumatologist..     Left arm tingling began 2 weeks ago when she started having increased stomach issues.The tingling does not radiate up her neck or into her back/chest and she denies weakness or loss of motion. It is aggravated when she lays down at night.         Abdominal Pain   This is a new problem. The current episode started 1 to 4 weeks ago. The onset quality is gradual. The problem occurs daily. The problem has been gradually worsening. The pain is located in the epigastric region and generalized abdominal region. The quality of the pain is aching, burning and a sensation of fullness. The abdominal pain radiates to the left shoulder (left shoulder and arm). Associated symptoms include arthralgias (Chronic), belching, diarrhea, flatus, frequency (Night time) and myalgias. Pertinent negatives include no anorexia, constipation, dysuria, fever, headaches, hematochezia, hematuria, nausea, vomiting or weight loss. The pain is aggravated by eating. The pain is relieved by nothing. She has tried antacids and H2 blockers for the symptoms. The treatment provided no relief. Since the pain got worse, she started taking her dicyclomine, which she says helps some.  She takes Carafate  every day, she states.  PPIs did not help her in the past.  Her past medical history is significant for GERD. There is no history of colon cancer or Crohn's disease.      Review of Systems   Constitutional: Negative for fever and weight loss.   Eyes: Negative for photophobia and visual disturbance.   Respiratory: Positive for choking. Negative for cough, chest tightness and shortness of breath.    Cardiovascular: Negative for leg swelling.   Gastrointestinal: Positive for abdominal pain, diarrhea and flatus. Negative for anorexia, constipation, hematochezia, nausea and vomiting.   Endocrine: Negative for polydipsia.   Genitourinary: Positive for frequency (Night time). Negative for dysuria, flank pain, hematuria and urgency.   Musculoskeletal: Positive for arthralgias (Chronic), myalgias and neck pain.   Skin: Negative for color change, rash and wound.   Neurological: Negative for facial asymmetry, numbness and headaches.       Objective:      Physical Exam   Constitutional: She is oriented to person, place, and time. She appears well-developed and well-nourished. No distress.   HENT:   Head: Normocephalic.   Eyes: Pupils are equal, round, and reactive to light.   Cardiovascular: Normal rate and normal heart sounds.    Pulmonary/Chest: Effort normal and breath sounds normal.   Abdominal: Soft. She exhibits distension. She exhibits no pulsatile liver, no pulsatile midline mass and no mass. Bowel sounds are increased. There is tenderness in the epigastric area. There is no rigidity, no rebound, no guarding and no CVA tenderness. No hernia.   Neurological: She is alert and oriented to person, place, and time.   MS:  Upper back and paracervical muscle tension noted.  Tenderness in the area of the C7 vertbrae.  Good ROM of the the extremities.   Skin: Skin is warm. Capillary refill takes less than 2 seconds. She is not diaphoretic.   Vitals reviewed.    Gastroesophageal reflux disease, esophagitis presence not  specified  -     omeprazole (PRILOSEC) 40 MG capsule; Take 1 capsule (40 mg total) by mouth once daily.  Dispense: 30 capsule; Refill: 0    Irritable bowel syndrome with diarrhea    Mucus in stool  Comments:  present since her acid and abd pain worsened    Globus sensation    Numbness and tingling in left arm  -     EKG 12-lead      Pt today presents with continuing stomach pain and feeling like she's choking.  Lots of bloating, flatulence and belching.  Taking sucralfate and zantac daily.  Resumed taking her dicyclomine when sxs exac.  These sxs are not new to pt but they are worse again.  Also reports tingling down her left arm but no CP or shortness of breath.  Seems worse when she lies down.  She does have some muscle tension in her neck and shoulder area, some tenderness in the C7 area.    This is a new problem to me and the following work up is planned.    Pulmonary or Cardiac Testing ordered: EKG.  The results are no ST-T wave elevations or depressions of concern.  The EKG was read by me today but will be reviewed by cardiology.  We will call her with that result.    Pt advised to perform comfort measures recommended on patient instruction sheet .    GI referral.  Enc pt to discuss these sxs with rheumatology, as well.  She has asked me to send a copy of the note to Dr. Kenyon, which I will do.  Explained exam findings, diagnosis and treatment plan to patient.  Questions answered and patient states understanding.

## 2017-09-25 ENCOUNTER — LAB VISIT (OUTPATIENT)
Dept: LAB | Facility: HOSPITAL | Age: 62
End: 2017-09-25
Attending: NURSE PRACTITIONER
Payer: MEDICARE

## 2017-09-25 ENCOUNTER — OFFICE VISIT (OUTPATIENT)
Dept: GASTROENTEROLOGY | Facility: CLINIC | Age: 62
End: 2017-09-25
Payer: MEDICARE

## 2017-09-25 VITALS
HEIGHT: 64 IN | SYSTOLIC BLOOD PRESSURE: 128 MMHG | RESPIRATION RATE: 18 BRPM | BODY MASS INDEX: 35.41 KG/M2 | HEART RATE: 72 BPM | WEIGHT: 207.44 LBS | DIASTOLIC BLOOD PRESSURE: 98 MMHG

## 2017-09-25 DIAGNOSIS — Z51.81 ENCOUNTER FOR MONITORING LONG-TERM PROTON PUMP INHIBITOR THERAPY: ICD-10-CM

## 2017-09-25 DIAGNOSIS — Z79.899 ENCOUNTER FOR MONITORING LONG-TERM PROTON PUMP INHIBITOR THERAPY: ICD-10-CM

## 2017-09-25 DIAGNOSIS — Z87.19 HISTORY OF IBS: ICD-10-CM

## 2017-09-25 DIAGNOSIS — R10.13 EPIGASTRIC PAIN: Primary | ICD-10-CM

## 2017-09-25 DIAGNOSIS — K21.9 GASTROESOPHAGEAL REFLUX DISEASE WITHOUT ESOPHAGITIS: ICD-10-CM

## 2017-09-25 DIAGNOSIS — K22.4 ESOPHAGEAL DYSMOTILITY: ICD-10-CM

## 2017-09-25 DIAGNOSIS — R05.9 COUGH: ICD-10-CM

## 2017-09-25 DIAGNOSIS — R19.5 OCCULT BLOOD POSITIVE STOOL: ICD-10-CM

## 2017-09-25 DIAGNOSIS — R13.14 PHARYNGOESOPHAGEAL DYSPHAGIA: ICD-10-CM

## 2017-09-25 DIAGNOSIS — Z87.11 HISTORY OF PEPTIC ULCER: ICD-10-CM

## 2017-09-25 LAB
MAGNESIUM SERPL-MCNC: 1.6 MG/DL
VIT B12 SERPL-MCNC: >2000 PG/ML

## 2017-09-25 PROCEDURE — 3074F SYST BP LT 130 MM HG: CPT | Mod: S$GLB,,, | Performed by: NURSE PRACTITIONER

## 2017-09-25 PROCEDURE — 82607 VITAMIN B-12: CPT

## 2017-09-25 PROCEDURE — 99214 OFFICE O/P EST MOD 30 MIN: CPT | Mod: S$GLB,,, | Performed by: NURSE PRACTITIONER

## 2017-09-25 PROCEDURE — 36415 COLL VENOUS BLD VENIPUNCTURE: CPT | Mod: PO

## 2017-09-25 PROCEDURE — 3080F DIAST BP >= 90 MM HG: CPT | Mod: S$GLB,,, | Performed by: NURSE PRACTITIONER

## 2017-09-25 PROCEDURE — 99999 PR PBB SHADOW E&M-EST. PATIENT-LVL IV: CPT | Mod: PBBFAC,,, | Performed by: NURSE PRACTITIONER

## 2017-09-25 PROCEDURE — 83735 ASSAY OF MAGNESIUM: CPT

## 2017-09-25 PROCEDURE — 99499 UNLISTED E&M SERVICE: CPT | Mod: S$GLB,,, | Performed by: NURSE PRACTITIONER

## 2017-09-25 PROCEDURE — 3008F BODY MASS INDEX DOCD: CPT | Mod: S$GLB,,, | Performed by: NURSE PRACTITIONER

## 2017-09-25 RX ORDER — OMEPRAZOLE 40 MG/1
40 CAPSULE, DELAYED RELEASE ORAL DAILY
Qty: 30 CAPSULE | Refills: 3 | Status: SHIPPED | OUTPATIENT
Start: 2017-09-25 | End: 2018-06-27 | Stop reason: SDUPTHER

## 2017-09-25 NOTE — PATIENT INSTRUCTIONS
Abdominal Pain    Abdominal pain is pain in the stomach or belly area. Everyone has this pain from time to time. In many cases it goes away on its own. But abdominal pain can sometimes be due to a serious problem, such as appendicitis. So its important to know when to seek help.  Causes of abdominal pain  There are many possible causes of abdominal pain. Common causes in adults include:  · Constipation, diarrhea, or gas  · Stomach acid flowing back up into the esophagus (acid reflux or heartburn)  · Severe acid reflux, called GERD (gastroesophageal reflux disease)  · A sore in the lining of the stomach or small intestine (peptic ulcer)  · Inflammation of the gallbladder, liver, or pancreas  · Gallstones or kidney stones  · Appendicitis   · Intestinal blockage   · An internal organ pushing through a muscle or other tissue (hernia)  · Urinary tract infections  · In women, menstrual cramps, fibroids, or endometriosis  · Inflammation or infection of the intestines  Diagnosing the cause of abdominal pain  Your healthcare provider will do a physical exam help find the cause of your pain. If needed, tests will be ordered. Belly pain has many possible causes. So it can be hard to find the reason for your pain. Giving details about your pain can help. Tell your provider where and when you feel the pain, and what makes it better or worse. Also let your provider know if you have other symptoms such as:  · Fever  · Tiredness  · Upset stomach (nausea)  · Vomiting  · Changes in bathroom habits  Treating abdominal pain  Some causes of pain need emergency medical treatment right away. These include appendicitis or a bowel blockage. Other problems can be treated with rest, fluids, or medicines. Your healthcare provider can give you specific instructions for treatment or self-care based on what is causing your pain.  If you have vomiting or diarrhea, sip water or other clear fluids. When you are ready to eat solid foods again,  start with small amounts of easy-to-digest, low-fat foods. These include apple sauce, toast, or crackers.   When to seek medical care  Call 911 or go to the hospital right away if you:  · Cant pass stool and are vomiting  · Are vomiting blood or have bloody diarrhea or black, tarry diarrhea  · Have chest, neck, or shoulder pain  · Feel like you might pass out  · Have pain in your shoulder blades with nausea  · Have sudden, severe belly pain  · Have new, severe pain unlike any you have felt before  · Have a belly that is rigid, hard, and tender to touch  Call your healthcare provider if you have:  · Pain for more than 5 days  · Bloating for more than 2 days  · Diarrhea for more than 5 days  · A fever of 100.4°F (38.0°C) or higher, or as directed by your provider  · Pain that gets worse  · Weight loss for no reason  · Continued lack of appetite  · Blood in your stool  How to prevent abdominal pain  Here are some tips to help prevent abdominal pain:  · Eat smaller amounts of food at one time.  · Avoid greasy, fried, or other high-fat foods.  · Avoid foods that give you gas.  · Exercise regularly.  · Drink plenty of fluids.  To help prevent GERD symptoms:  · Quit smoking.  · Reduce alcohol and certain foods that increase stomach acid.  · Avoid aspirin and over-the-counter pain and fever medicines (NSAIDS or nonsteroidal anti-inflammatory drugs), if possible  · Lose extra weight.  · Finish eating at least 2 hours before you go to bed or lie down.  · Raise the head of your bed.  Date Last Reviewed: 7/1/2016  © 9609-5265 [employer]. [address] [place] [address], [place] [address]. All rights reserved. This information is not intended as a substitute for professional medical care. Always follow your healthcare professional's instructions.        GERD (Adult)    The esophagus is a tube that carries food from the mouth to the stomach. A valve at the lower end of the esophagus prevents stomach acid from flowing  "upward. When this valve doesn't work properly, stomach contents may repeatedly flow back up (reflux) into the esophagus. This is called gastroesophageal reflux disease (GERD). GERD can irritate the esophagus. It can cause problems with swallowing or breathing. In severe cases, GERD can cause recurrent pneumonia or other serious problems.  Symptoms of reflux include burning, pressure or sharp pain in the upper abdomen or mid to lower chest. The pain can spread to the neck, back, or shoulder. There may be belching, an acid taste in the back of the throat, chronic cough, or sore throat or hoarseness. GERD symptoms often occur during the day after a big meal. They can also occur at night when lying down.   Home care  Lifestyle changes can help reduce symptoms. If needed, medicines may be prescribed. Symptoms often improve with treatment, but if treatment is stopped, the symptoms often return after a few months. So most persons with GERD will need to continue treatment.  Lifestyle changes  · Limit or avoid fatty, fried, and spicy foods, as well as coffee, chocolate, mint, and foods with high acid content such as tomatoes and citrus fruit and juices (orange, grapefruit, lemon).  · Dont eat large meals, especially at night. Frequent, smaller meals are best. Do not lie down right after eating. And dont eat anything 3 hours before going to bed.  · Avoid drinking alcohol and smoking. As much as possible, stay away from second hand smoke.  · If you are overweight, losing weight will reduce symptoms.   · Avoid wearing tight clothing around your stomach area.  · If your symptoms occur during sleep, use a foam wedge to elevate your upper body (not just your head.) Or, place 4" blocks under the head of your bed.  Medicines  If needed, medicines can help relieve the symptoms of GERD and prevent damage to the esophagus. Discuss a medicine plan with your healthcare provider. This may include one or more of the following " medicines:  · Antacids to help neutralize the normal acids in your stomach.  · Acid blockers (H2 blockers) to decrease acid production.  · Acid inhibitors (PPIs) to decrease acid production in a different way than the blockers. They may work better, but can take a little longer to take effect.  Take an antacid 30-60 minutes after eating and at bedtime, but not at the same time as an acid blocker.  Try not to take medicines such as ibuprofen and aspirin. If you are taking aspirin for your heart or other medical reasons, talk to your healthcare provider about stopping it.  Follow-up care  Follow up with your healthcare provider or as advised by our staff.  When to seek medical advice  Call your healthcare provider if any of the following occur:  · Stomach pain gets worse or moves to the lower right abdomen (appendix area)  · Chest pain appears or gets worse, or spreads to the back, neck, shoulder, or arm  · Frequent vomiting (cant keep down liquids)  · Blood in the stool or vomit (red or black in color)  · Feeling weak or dizzy  · Fever of 100.4ºF (38ºC) or higher, or as directed by your healthcare provider  Date Last Reviewed: 6/23/2015  © 7063-4041 [employer]. [address] [place] [address], [place] [address]. All rights reserved. This information is not intended as a substitute for professional medical care. Always follow your healthcare professional's instructions.

## 2017-09-25 NOTE — PROGRESS NOTES
Subjective:       Patient ID: Vida Ornelas is a 62 y.o. female Body mass index is 35.61 kg/m².    Chief Complaint: Abdominal Pain (stomach burning )    Established patient of Dr. Davis & myself.    Gastroesophageal Reflux   She complains of abdominal pain, belching (after eating, frequently), coughing (clear sputum), dysphagia (recurred; feels like food gets stuck in throat; relieved with EGD with dilation in the past) and heartburn. She reports no chest pain, no choking, no globus sensation, no hoarse voice, no nausea, no sore throat or no water brash. This is a chronic problem. Episode onset: years. The problem occurs frequently. The problem has been unchanged (worsened 2 weeks ago, improving since she restarted omeprazole). The heartburn wakes (occasional) her from sleep. The symptoms are aggravated by stress and certain foods (red sauce, spicy foods). Associated symptoms include fatigue. Pertinent negatives include no melena or weight loss (stable lately, trying to lose weight). Risk factors include caffeine use, obesity, NSAIDs and smoking/tobacco exposure (nsaids has held off recently- usually takes celebrex prn for arthritis; takes arava daily for RA; former smoker). She has tried a PPI and a histamine-2 antagonist (celebrex prn- stopped 2 weeks ago when abdominal pain recurred; restarted omeprazole 40 mg once daily on 9/18/17- significant relief; zantac 300 mg once daily; carafate 1 gram qid PAST: dexilant) for the symptoms. The treatment provided significant relief. Past procedures include an abdominal ultrasound, an EGD (2015) and a UGI.   Abdominal Pain   This is a recurrent problem. Episode onset: recurred about 2 weeks ago. Episode frequency: with eating. The pain is located in the epigastric region. The pain is at a severity of 5/10. The quality of the pain is burning (bloating). The abdominal pain does not radiate. Associated symptoms include belching (after eating, frequently). Pertinent  negatives include no anorexia, constipation, diarrhea, dysuria, fever, frequency, hematochezia, melena, nausea, vomiting or weight loss (stable lately, trying to lose weight). The pain is aggravated by eating. Treatments tried: celebrex prn- stopped 2 weeks ago when abdominal pain recurred; restarted omeprazole 40 mg once daily on 9/18/17- significant relief; zantac 300 mg once daily; carafate 1 gram qid. Prior diagnostic workup includes upper endoscopy and ultrasound. Her past medical history is significant for GERD, irritable bowel syndrome and PUD. There is no history of Crohn's disease, gallstones, pancreatitis or ulcerative colitis.     Review of Systems   Constitutional: Positive for appetite change (decreased) and fatigue. Negative for chills, diaphoresis, fever, unexpected weight change and weight loss (stable lately, trying to lose weight).   HENT: Positive for trouble swallowing. Negative for hoarse voice, mouth sores and sore throat.    Respiratory: Positive for cough (clear sputum). Negative for choking, chest tightness and shortness of breath.    Cardiovascular: Negative for chest pain and palpitations.   Gastrointestinal: Positive for abdominal pain, dysphagia (recurred; feels like food gets stuck in throat; relieved with EGD with dilation in the past) and heartburn. Negative for abdominal distention, anal bleeding, anorexia, blood in stool (denies bright red blood in stool), constipation, diarrhea, hematochezia, melena, nausea, rectal pain and vomiting.   Genitourinary: Negative for difficulty urinating, dysuria, flank pain, frequency, pelvic pain and urgency.   Musculoskeletal: Negative for back pain.        Sees rheumatology for RA   Neurological: Negative for weakness.       Past Medical History:   Diagnosis Date    Arthritis     Seronegative rheumatoid arthritis    Back pain     s/p ena     Colon polyp     ESR raised     Former smoker     GERD with stricture     Hyperlipidemia      Hypothyroidism     Irritable bowel syndrome     Peptic ulcer disease     RA (rheumatoid arthritis)     Vitamin D deficiency      Past Surgical History:   Procedure Laterality Date    COLONOSCOPY  6/15/2015    Dr. lepe: colonic spasm consistent with IBS, otherwise normal findings; repeat in 5 years for surveillance due to family history of colon cancer in father    COLONOSCOPY W/ POLYPECTOMY  ? ?  (Stevie)    Benign polyps removed.    ESOPHAGOGASTRODUODENOSCOPY  early ??    ESOPHAGOSCOPY W/ DILATION  2015  Ryan    Non-erosive esophageal reflux (NERD) disease?.  Dysphagia, esophageal spasm?   Esophagus dilated, 51 Fr.  Antral erythema.  DAYNA Test  Negative.    HYSTERECTOMY      TOTAL HIP ARTHROPLASTY Right 2015    UPPER GASTROINTESTINAL ENDOSCOPY  2015    UPPER GASTROINTESTINAL ENDOSCOPY      dilation    UPPER GASTROINTESTINAL ENDOSCOPY  04/15/2016    Dr. Lepe     Family History   Problem Relation Age of Onset    Rheum arthritis Mother     Arthritis Mother      rheumatoid arthiritis    Lupus Sister     Hypertension Sister     Arthritis Sister      rheumatoid arthritis    Cancer Father      colon CA    Arthritis Father      rheumatoid arthritis    Colon cancer Father      unsure of age of diagnosis,  at 85 years old    Breast cancer Neg Hx     Crohn's disease Neg Hx     Ulcerative colitis Neg Hx     Stomach cancer Neg Hx     Esophageal cancer Neg Hx      Wt Readings from Last 10 Encounters:   17 94.1 kg (207 lb 7.3 oz)   17 95.1 kg (209 lb 10.5 oz)   17 95.5 kg (210 lb 8.6 oz)   17 98.5 kg (217 lb 3.2 oz)   17 96.3 kg (212 lb 4.9 oz)   17 97.4 kg (214 lb 11.7 oz)   17 99.1 kg (218 lb 7.6 oz)   16 96.5 kg (212 lb 11.9 oz)   16 96.7 kg (213 lb 3 oz)   16 97 kg (213 lb 13.5 oz)     Lab Results   Component Value Date    WBC 6.41 2017    HGB 12.2 2017    HCT 37.8 2017    MCV 90 2017      06/27/2017     CMP  Sodium   Date Value Ref Range Status   05/01/2017 143 136 - 145 mmol/L Final     Potassium   Date Value Ref Range Status   05/01/2017 3.9 3.5 - 5.1 mmol/L Final     Chloride   Date Value Ref Range Status   05/01/2017 109 95 - 110 mmol/L Final     CO2   Date Value Ref Range Status   05/01/2017 28 23 - 29 mmol/L Final     Glucose   Date Value Ref Range Status   05/01/2017 83 70 - 110 mg/dL Final     BUN, Bld   Date Value Ref Range Status   05/01/2017 15 6 - 20 mg/dL Final     Creatinine   Date Value Ref Range Status   05/01/2017 0.7 0.5 - 1.4 mg/dL Final     Calcium   Date Value Ref Range Status   05/01/2017 9.3 8.7 - 10.5 mg/dL Final     Total Protein   Date Value Ref Range Status   05/01/2017 6.7 6.0 - 8.4 g/dL Final     Albumin   Date Value Ref Range Status   05/01/2017 3.3 (L) 3.5 - 5.2 g/dL Final     Total Bilirubin   Date Value Ref Range Status   05/01/2017 0.4 0.1 - 1.0 mg/dL Final     Comment:     For infants and newborns, interpretation of results should be based  on gestational age, weight and in agreement with clinical  observations.  Premature Infant recommended reference ranges:  Up to 24 hours.............<8.0 mg/dL  Up to 48 hours............<12.0 mg/dL  3-5 days..................<15.0 mg/dL  6-29 days.................<15.0 mg/dL       Alkaline Phosphatase   Date Value Ref Range Status   05/01/2017 78 55 - 135 U/L Final     AST   Date Value Ref Range Status   05/01/2017 18 10 - 40 U/L Final     ALT   Date Value Ref Range Status   05/01/2017 18 10 - 44 U/L Final     Anion Gap   Date Value Ref Range Status   05/01/2017 6 (L) 8 - 16 mmol/L Final     eGFR if    Date Value Ref Range Status   05/01/2017 >60.0 >60 mL/min/1.73 m^2 Final     eGFR if non    Date Value Ref Range Status   05/01/2017 >60.0 >60 mL/min/1.73 m^2 Final     Comment:     Calculation used to obtain the estimated glomerular filtration  rate (eGFR) is the CKD-EPI equation. Since  "race is unknown   in our information system, the eGFR values for   -American and Non--American patients are given   for each creatinine result.       Lab Results   Component Value Date    LIPASE 44 03/07/2016     Lab Results   Component Value Date    AMYLASE 72 03/07/2016     Lab Results   Component Value Date    TSH 2.235 06/27/2017     Lab Results   Component Value Date    OCCULTBLOOD Positive (A) 03/14/2016    OCCULTBLOOD Negative 03/14/2016    OCCULTBLOOD Positive (A) 03/14/2016     Lab Results   Component Value Date    VGBECEQY21 873 01/17/2012     Reviewed prior medical records including radiology report of 3/22/16 UGI with SB (severe GERD and esophageal dysmotility), 3/1/16 abdominal ultrasound, & endoscopy history (see surgical history).    6/15/15 Colonoscopy was reviewed and procedure report states:   " Impression: - Mild colonic spasm consistent with irritable bowel   syndrome.  - The examination was otherwise normal.  - The examined portion of the ileum was normal.  - No specimens collected.  Recommendation: - Discharge patient to home.  - High fiber diet.  - Take a PROBIOTIC, such as a carton of GREEK YOGURT   (Chobani or Oikos, or Activia or Dannon); or tablets   of ALIGN or CULTURELLE or NEW-Q (all   non-prescription), every day for a month.  - Repeat colonoscopy in 6 years for screening   purposes.  - Use Bentyl (dicyclomine) 10 mg PO QID 30 min AC.  - Continue present medications.  - Patient has a contact number available for   emergencies. The signs and symptoms of potential   delayed complications were discussed with the   patient. Return to normal activities tomorrow.   Written discharge instructions were provided to the   patient.  - Return to normal activities tomorrow. ".    4/15/16 EGD was reviewed and procedure report states:   " Impression:          - Normal oropharynx.                       - Normal esophagus.                       - Z-line regular, 35 cm from the " "incisors.                       - Non-erosive esophageal reflux (NERD) disease?.                       - No endoscopic esophageal abnormality to explain                        patient's dysphagia. Esophagus dilated, 51 Fr.                       - Antral mucosal atrophy. Biopsied.                       - Normal stomach otherwise.                       - Normal examined duodenum.                       - Normal examined jejunum.  Recommendation:      - Discharge patient to home.                       - Observe patient's clinical course following                        today's procedure with therapeutic intervention.                       - Await CLOtest results.                       - Continue present medications.                       - Use Dexilant (dexlansoprazole) 60 mg PO daily.                       - Use sucralfate tablets 1 gram PO QID.                       - Use dicyclomine 10 mg PO QID; 30 min AC and HS.                       - Call the G.I. clinic in 2 weeks for reports (if                        you haven't heard from us sooner) [phone].                       - Return to GI clinic in 6-8 weeks.                       - If shows evidence of blood in stool, consider next                        perform a video capsule study. ".  Biopsy results:    "jaclyn test negative"  Objective:      Physical Exam   Constitutional: She is oriented to person, place, and time. She appears well-developed and well-nourished. No distress.   HENT:   Head: Atraumatic.   Mouth/Throat: Oropharynx is clear and moist and mucous membranes are normal. No oral lesions. No oropharyngeal exudate.   Eyes: Conjunctivae are normal. Pupils are equal, round, and reactive to light. No scleral icterus.   Neck: Normal range of motion. Neck supple.   Cardiovascular: Normal rate and regular rhythm.    Pulmonary/Chest: Effort normal and breath sounds normal. No respiratory distress. She has no wheezes. She has no rhonchi. She has no rales. "   Abdominal: Soft. Normal appearance and bowel sounds are normal. She exhibits no distension, no abdominal bruit, no ascites and no mass. There is no hepatosplenomegaly. There is no tenderness. There is no rigidity, no rebound, no guarding, no tenderness at McBurney's point and negative Woodard's sign. No hernia.   Lymphadenopathy:     She has no cervical adenopathy.   Neurological: She is alert and oriented to person, place, and time.   Skin: Skin is warm and dry. No rash noted. She is not diaphoretic. No erythema. No pallor.   Non-jaundiced   Psychiatric: She has a normal mood and affect. Her behavior is normal.   Nursing note and vitals reviewed.      Assessment:       1. Epigastric pain    2. Gastroesophageal reflux disease without esophagitis    3. History of peptic ulcer    4. Occult blood positive stool    5. Esophageal dysmotility    6. Encounter for monitoring long-term proton pump inhibitor therapy    7. Pharyngoesophageal dysphagia    8. Gastroesophageal reflux disease, esophagitis presence not specified    9. Cough        Plan:       Epigastric pain  -     Occult blood x 1, stool; Future; Expected date: 09/25/2017  -     Occult blood x 1, stool; Future; Expected date: 09/25/2017  -     Occult blood x 1, stool; Future; Expected date: 09/25/2017  - schedule EGD, discussed procedure with patient, patient verbalized understanding  -  continue   dicyclomine (BENTYL) 10 MG capsule; Take 1 capsule (10 mg total) by mouth every 6 (six) hours as needed. Abdominal pain/cramping     History of peptic ulcer  -     Occult blood x 1, stool; Future; Expected date: 09/25/2017  -     Occult blood x 1, stool; Future; Expected date: 09/25/2017  -     Occult blood x 1, stool; Future; Expected date: 09/25/2017  - schedule EGD, discussed procedure with patient, patient verbalized understanding    Occult blood positive stool  -     Occult blood x 1, stool; Future; Expected date: 09/25/2017  -     Occult blood x 1, stool; Future;  Expected date: 09/25/2017  -     Occult blood x 1, stool; Future; Expected date: 09/25/2017  - schedule EGD, discussed procedure with patient, patient verbalized understanding  - possible video capsule study pending results of testing and if symptoms persist    Encounter for monitoring long-term proton pump inhibitor therapy  -     Magnesium; Future; Expected date: 09/25/2017  -     Vitamin B12; Future; Expected date: 09/25/2017  - discussed with patient about long term use of reflux medications and the risk of using these medications long term. Some research studies (not all) have shown decrease absorption of calcium when taking PPI's and H2 blockers, but those same research studies showed that adequate dietary (milk and cheese) and/or supplement of calcium and vitamin d supplement induces enough acid secretion for calcium absorption. Also, discussed about the risk vs benefit of untreated GERD such as martinez's esophagus.  - recommend annual monitoring with blood work to include CMP, CBC, vitamin B12, and magnesium.    Esophageal dysmotility & Pharyngoesophageal dysphagia  - schedule EGD, discussed procedure with patient and possible esophageal dilation may be performed during procedure if indicated, patient verbalized understanding  - educated patient to eat smaller more frequent meals and to eat slowly and advised to eat a soft diet.  - possible esophageal manometry if symptoms persist  - possible trial of levsin    Gastroesophageal reflux disease without esophagitis  -  REFILL   omeprazole (PRILOSEC) 40 MG capsule; Take 1 capsule (40 mg total) by mouth once daily.  Dispense: 30 capsule; Refill: 3,  take in the morning before breakfast, discussed about possible long term use of medication, pt verbalized understanding  -discussed the different types of medications used to treat reflux and how to use them, antacids can be used PRN for breakthrough heartburn symptoms by reducing stomach acid that is already  produced, H2 blockers (zantac) work by limiting the amount acid production, & PPI's work to block acid production and are taken daily, patient verbalized understanding.  - continue zantac 300 mg once daily as directed  - continue lifestyle modifications to help control reflux including: avoid large meals, avoid eating within 2-3 hours of bedtime (avoid late night eating & lying down soon after eating), elevate head of bed if nocturnal symptoms are present, & weight loss.   - avoid known foods which trigger reflux symptoms & to minimize/avoid high-fat foods, chocolate, caffeine, citrus, alcohol, & tomato products.  - avoid/limit use of NSAID's, since they can cause GI upset, bleeding, and/or ulcers. If needed, take with food.  -  continue   sucralfate (CARAFATE) 1 gram tablet; Take 1 tablet (1 g total) by mouth 4 (four) times daily before meals and nightly.    - schedule EGD, discussed procedure with patient, patient verbalized understanding    Cough  Recommend follow-up with Primary Care Provider for continued evaluation and management.    History of IBS  -  continue   dicyclomine (BENTYL) 10 MG capsule; Take 1 capsule (10 mg total) by mouth every 6 (six) hours as needed. Abdominal pain/cramping   - recommended OTC probiotics, such as Align or Culturelle, as directed  - avoid lactose  - drink adequate water intake  -smaller, more frequent meals  -avoid trigger foods  - possible trial of levsin    Return in about 1 month (around 10/25/2017), or if symptoms worsen or fail to improve.    If no improvement in symptoms or symptoms worsen, call/follow-up at clinic or go to ER.

## 2017-09-27 ENCOUNTER — TELEPHONE (OUTPATIENT)
Dept: INTERNAL MEDICINE | Facility: CLINIC | Age: 62
End: 2017-09-27

## 2017-09-27 ENCOUNTER — TELEPHONE (OUTPATIENT)
Dept: GASTROENTEROLOGY | Facility: CLINIC | Age: 62
End: 2017-09-27

## 2017-09-27 ENCOUNTER — TELEPHONE (OUTPATIENT)
Dept: RHEUMATOLOGY | Facility: CLINIC | Age: 62
End: 2017-09-27

## 2017-09-27 NOTE — TELEPHONE ENCOUNTER
----- Message from PEGGY Wells sent at 9/26/2017  8:06 AM CDT -----  Please call to inform & review the results with the patient- blood work showed normal magnesium levels and elevated vitamin B12 levels (patient gets B12 injections- recommend follow-up with provider who manages it for continued evaluation and management).  Continue with previous recommendations.  Thanks,  Saniya WOODS-C

## 2017-09-27 NOTE — TELEPHONE ENCOUNTER
Called pt regarding lab results. Pt verbalized understanding and will call her pcp regarding elevated b-12 levels.

## 2017-09-27 NOTE — TELEPHONE ENCOUNTER
----- Message from Chastity Rea sent at 9/26/2017  3:55 PM CDT -----  Contact: Vida  Cindy is calling to ask for increase on B-12 as has no energy. Please call [phone]. Thanks!

## 2017-09-27 NOTE — TELEPHONE ENCOUNTER
----- Message from Shantel Muniz sent at 9/27/2017 10:06 AM CDT -----  Patient is requesting a return call states Saniya Gann instructed her to call to inform of blood work, contact patient at [phone].    Thank you

## 2017-10-03 ENCOUNTER — ANESTHESIA (OUTPATIENT)
Dept: ENDOSCOPY | Facility: HOSPITAL | Age: 62
End: 2017-10-03
Payer: MEDICARE

## 2017-10-03 ENCOUNTER — HOSPITAL ENCOUNTER (OUTPATIENT)
Facility: HOSPITAL | Age: 62
Discharge: HOME | End: 2017-10-03
Attending: INTERNAL MEDICINE | Admitting: INTERNAL MEDICINE
Payer: MEDICARE

## 2017-10-03 ENCOUNTER — ANESTHESIA EVENT (OUTPATIENT)
Dept: ENDOSCOPY | Facility: HOSPITAL | Age: 62
End: 2017-10-03
Payer: MEDICARE

## 2017-10-03 ENCOUNTER — SURGERY (OUTPATIENT)
Age: 62
End: 2017-10-03

## 2017-10-03 VITALS
WEIGHT: 207 LBS | TEMPERATURE: 97 F | SYSTOLIC BLOOD PRESSURE: 133 MMHG | HEART RATE: 64 BPM | HEIGHT: 64 IN | DIASTOLIC BLOOD PRESSURE: 81 MMHG | BODY MASS INDEX: 35.34 KG/M2 | OXYGEN SATURATION: 100 % | RESPIRATION RATE: 14 BRPM

## 2017-10-03 VITALS — RESPIRATION RATE: 14 BRPM

## 2017-10-03 DIAGNOSIS — K21.9 GERD (GASTROESOPHAGEAL REFLUX DISEASE): ICD-10-CM

## 2017-10-03 LAB — H PYLORI INDEX VALUE: NEGATIVE

## 2017-10-03 PROCEDURE — 43248 EGD GUIDE WIRE INSERTION: CPT | Mod: PO | Performed by: INTERNAL MEDICINE

## 2017-10-03 PROCEDURE — 43239 EGD BIOPSY SINGLE/MULTIPLE: CPT | Mod: 51,,, | Performed by: INTERNAL MEDICINE

## 2017-10-03 PROCEDURE — 27201012 HC FORCEPS, HOT/COLD, DISP: Mod: PO | Performed by: INTERNAL MEDICINE

## 2017-10-03 PROCEDURE — D9220A PRA ANESTHESIA: Mod: ANES,,, | Performed by: ANESTHESIOLOGY

## 2017-10-03 PROCEDURE — 63600175 PHARM REV CODE 636 W HCPCS: Mod: PO | Performed by: NURSE ANESTHETIST, CERTIFIED REGISTERED

## 2017-10-03 PROCEDURE — 43239 EGD BIOPSY SINGLE/MULTIPLE: CPT | Mod: PO | Performed by: INTERNAL MEDICINE

## 2017-10-03 PROCEDURE — 88305 TISSUE EXAM BY PATHOLOGIST: CPT | Performed by: PATHOLOGY

## 2017-10-03 PROCEDURE — 25000003 PHARM REV CODE 250: Mod: PO | Performed by: INTERNAL MEDICINE

## 2017-10-03 PROCEDURE — 43248 EGD GUIDE WIRE INSERTION: CPT | Mod: ,,, | Performed by: INTERNAL MEDICINE

## 2017-10-03 PROCEDURE — 88305 TISSUE EXAM BY PATHOLOGIST: CPT | Mod: 26,,, | Performed by: PATHOLOGY

## 2017-10-03 PROCEDURE — 88342 IMHCHEM/IMCYTCHM 1ST ANTB: CPT | Mod: 26,,, | Performed by: PATHOLOGY

## 2017-10-03 PROCEDURE — 87449 NOS EACH ORGANISM AG IA: CPT | Mod: PO | Performed by: INTERNAL MEDICINE

## 2017-10-03 PROCEDURE — D9220A PRA ANESTHESIA: Mod: CRNA,,, | Performed by: NURSE ANESTHETIST, CERTIFIED REGISTERED

## 2017-10-03 PROCEDURE — 37000009 HC ANESTHESIA EA ADD 15 MINS: Mod: PO | Performed by: INTERNAL MEDICINE

## 2017-10-03 PROCEDURE — 37000008 HC ANESTHESIA 1ST 15 MINUTES: Mod: PO | Performed by: INTERNAL MEDICINE

## 2017-10-03 RX ORDER — SUCRALFATE 1 G/1
1 TABLET ORAL
Qty: 120 TABLET | Refills: 4 | Status: SHIPPED | OUTPATIENT
Start: 2017-10-03 | End: 2018-04-27

## 2017-10-03 RX ORDER — LIDOCAINE HYDROCHLORIDE 10 MG/ML
1 INJECTION INFILTRATION; PERINEURAL ONCE
Status: COMPLETED | OUTPATIENT
Start: 2017-10-03 | End: 2017-10-03

## 2017-10-03 RX ORDER — PROPOFOL 10 MG/ML
VIAL (ML) INTRAVENOUS
Status: DISCONTINUED | OUTPATIENT
Start: 2017-10-03 | End: 2017-10-03

## 2017-10-03 RX ORDER — LIDOCAINE HCL/PF 100 MG/5ML
SYRINGE (ML) INTRAVENOUS
Status: DISCONTINUED | OUTPATIENT
Start: 2017-10-03 | End: 2017-10-03

## 2017-10-03 RX ORDER — SODIUM CHLORIDE, SODIUM LACTATE, POTASSIUM CHLORIDE, CALCIUM CHLORIDE 600; 310; 30; 20 MG/100ML; MG/100ML; MG/100ML; MG/100ML
INJECTION, SOLUTION INTRAVENOUS CONTINUOUS
Status: DISCONTINUED | OUTPATIENT
Start: 2017-10-03 | End: 2017-10-03 | Stop reason: HOSPADM

## 2017-10-03 RX ADMIN — PROPOFOL 50 MG: 10 INJECTION, EMULSION INTRAVENOUS at 02:10

## 2017-10-03 RX ADMIN — PROPOFOL 25 MG: 10 INJECTION, EMULSION INTRAVENOUS at 02:10

## 2017-10-03 RX ADMIN — LIDOCAINE HYDROCHLORIDE 1 ML: 10 INJECTION, SOLUTION EPIDURAL; INFILTRATION; INTRACAUDAL; PERINEURAL at 02:10

## 2017-10-03 RX ADMIN — PROPOFOL 125 MG: 10 INJECTION, EMULSION INTRAVENOUS at 02:10

## 2017-10-03 RX ADMIN — SODIUM CHLORIDE, SODIUM LACTATE, POTASSIUM CHLORIDE, AND CALCIUM CHLORIDE: 600; 310; 30; 20 INJECTION, SOLUTION INTRAVENOUS at 02:10

## 2017-10-03 RX ADMIN — LIDOCAINE HYDROCHLORIDE 100 MG: 20 INJECTION, SOLUTION INTRAVENOUS at 02:10

## 2017-10-03 NOTE — ANESTHESIA PREPROCEDURE EVALUATION
10/03/2017  Vida Ornelas is a 62 y.o., female.    Pre-op Assessment    I have reviewed the Patient Summary Reports.     I have reviewed the Nursing Notes.   I have reviewed the Medications.     Review of Systems  Anesthesia Hx:  No problems with previous Anesthesia  Denies Family Hx of Anesthesia complications.   Denies Personal Hx of Anesthesia complications.   Social:  Former smoker   Cardiovascular:   Exercise tolerance: good Denies CAD.    Denies CABG/stent.   Denies Angina. hyperlipidemia    Hepatic/GI:   PUD, GERD    Musculoskeletal:   Arthritis  RA Spine Disorders:    Neurological:   Chronic Pain Syndrome   Endocrine:   Hypothyroidism        Physical Exam  General:  Obesity    Airway/Jaw/Neck:  Airway Findings: Mouth Opening: Normal Tongue: Normal  Mallampati: II  Improves to I with phonation.  TM Distance: Normal, at least 6 cm       Chest/Lungs:  Chest/Lungs Findings: Clear to auscultation, Normal Respiratory Rate     Heart/Vascular:  Heart Findings: Rate: Normal  Rhythm: Regular Rhythm  Sounds: Normal        Mental Status:  Mental Status Findings:  Cooperative, Alert and Oriented         Anesthesia Plan  Type of Anesthesia, risks & benefits discussed:  Anesthesia Type:  general  Patient's Preference:   Intra-op Monitoring Plan:   Intra-op Monitoring Plan Comments:   Post Op Pain Control Plan:   Post Op Pain Control Plan Comments:   Induction:   IV  Beta Blocker:  Patient is not currently on a Beta-Blocker (No further documentation required).       Informed Consent: Patient understands risks and agrees with Anesthesia plan.  Questions answered. Anesthesia consent signed with patient.  ASA Score: 2     Day of Surgery Review of History & Physical:    H&P update referred to the provider.         Ready For Surgery From Anesthesia Perspective.

## 2017-10-03 NOTE — DISCHARGE INSTRUCTIONS
Recovery After Procedural Sedation (Adult)  You have been given medicine by vein to make you sleep during your surgery. This may have included both a pain medicine and sleeping medicine. Most of the effects have worn off. But you may still have some drowsiness for the next 6 to 8 hours.  Home care  Follow these guidelines when you get home:  · For the next 8 hours, you should be watched by a responsible adult. This person should make sure your condition is not getting worse.  · Don't drink any alcohol for the next 24 hours.  · Don't drive, operate dangerous machinery, or make important business or personal decisions during the next 24 hours.  Note: Your healthcare provider may tell you not to take any medicine by mouth for pain or sleep in the next 4 hours. These medicines may react with the medicines you were given in the hospital. This could cause a much stronger response than usual.  Follow-up care  Follow up with your healthcare provider if you are not alert and back to your usual level of activity within 12 hours.  When to seek medical advice  Call your healthcare provider right away if any of these occur:  · Drowsiness gets worse  · Weakness or dizziness gets worse  · Repeated vomiting  · You can't be awakened   Date Last Reviewed: 10/18/2016  © 1155-4355 [employer]. [address] [place] [address], [place] [address]. All rights reserved. This information is not intended as a substitute for professional medical care. Always follow your healthcare professional's instructions.        Tips to Control Acid Reflux    To control acid reflux, youll need to make some basic diet and lifestyle changes. The simple steps outlined below may be all youll need to ease discomfort.  Watch what you eat  · Avoid fatty foods and spicy foods.  · Eat fewer acidic foods, such as citrus and tomato-based foods. These can increase symptoms.  · Limit drinking alcohol, caffeine, and fizzy beverages. All increase acid  reflux.  · Try limiting chocolate, peppermint, and spearmint. These can worsen acid reflux in some people.  Watch when you eat  · Avoid lying down for 3 hours after eating.  · Do not snack before going to bed.  Raise your head  Raising your head and upper body by 4 to 6 inches helps limit reflux when youre lying down. Put blocks under the head of your bed frame to raise it.  Other changes  · Lose weight, if you need to  · Dont exercise near bedtime  · Avoid tight-fitting clothes  · Limit aspirin and ibuprofen  · Stop smoking   Date Last Reviewed: 7/1/2016  © 6814-9619 [employer]. [address] [place] [address], [place] [address]. All rights reserved. This information is not intended as a substitute for professional medical care. Always follow your healthcare professional's instructions.

## 2017-10-03 NOTE — BRIEF OP NOTE
Discharge Note  Short Stay      SUMMARY     Admit Date: 10/3/2017    Attending Physician: Juan David Davis Jr., MD     Discharge Physician: Juan David Davis Jr., MD    Discharge Date: 10/3/2017 3:28 PM    Final Diagnosis: Epigastric pain [R10.13]  Gastroesophageal reflux disease, esophagitis presence not specified [K21.9]  Dysphagia, unspecified type [R13.10]    Minimal reflux esophagitis.  Minimal antritis.    Disposition: HOME OR SELF CARE    Patient Instructions:   Current Discharge Medication List      CONTINUE these medications which have CHANGED    Details   sucralfate (CARAFATE) 1 gram tablet Take 1 tablet (1 g total) by mouth 4 (four) times daily before meals and nightly.  Qty: 120 tablet, Refills: 4         CONTINUE these medications which have NOT CHANGED    Details   celecoxib (CELEBREX) 200 MG capsule Take 200 mg by mouth once daily.       cyanocobalamin 1,000 mcg/mL injection INJECT 1ML INTO THE SKIN EVERY 7 DAYS  Refills: 3      dicyclomine (BENTYL) 10 MG capsule Take 10 mg by mouth every 6 (six) hours as needed.      fish oil-omega-3 fatty acids 300-1,000 mg capsule Take 2 g by mouth once daily.      hydrocodone-acetaminophen 10-325mg (NORCO)  mg Tab Take 1 tablet by mouth.      leflunomide (ARAVA) 20 MG Tab Take 1 tablet (20 mg total) by mouth once daily.  Qty: 30 tablet, Refills: 6    Associated Diagnoses: Rheumatoid arthritis of hand, unspecified laterality, unspecified rheumatoid factor presence; Hip pain, left; Osteoarthrosis of hip; Acquired hypothyroidism; ESR raised; Primary osteoarthritis of both hips      levothyroxine (SYNTHROID) 25 MCG tablet TAKE ONE TABLET BY MOUTH EVERY MORNING before breakfast and hold all food and meds for 30 minutes after taking  Qty: 30 tablet, Refills: 1    Comments: This prescription was filled on 8/31/2017. Any refills authorized will be placed on file.      lidocaine HCL 2% (XYLOCAINE) 2 % jelly Apply ONE a small amount to affected area three times a day  as needed  Refills: 1      multivitamin (ONE DAILY MULTIVITAMIN) per tablet Take 1 tablet by mouth once daily.      omeprazole (PRILOSEC) 40 MG capsule Take 1 capsule (40 mg total) by mouth once daily.  Qty: 30 capsule, Refills: 3    Associated Diagnoses: Gastroesophageal reflux disease without esophagitis      ranitidine (ZANTAC) 300 MG tablet TAKE ONE TABLET BY MOUTH IN THE EVENING 30-60 MINUTES before bedtime  Qty: 30 tablet, Refills: 1    Associated Diagnoses: Gastroesophageal reflux disease, esophagitis presence not specified      VOLTAREN 1 % Gel Refills: 4             Discharge Procedure Orders (must include Diet, Follow-up, Activity)    Follow Up:  Follow up with PCP as per your routine.  Please follow an anti reflux diet and a high fiber diet.  Activity as tolerated.    No driving day of procedure.

## 2017-10-03 NOTE — ANESTHESIA POSTPROCEDURE EVALUATION
"Anesthesia Post Evaluation    Patient: Vida Ornelas    Procedure(s) Performed: Procedure(s) (LRB):  ESOPHAGOGASTRODUODENOSCOPY (EGD) (N/A)    Final Anesthesia Type: general  Patient location during evaluation: PACU  Patient participation: Yes- Able to Participate  Level of consciousness: awake and alert  Post-procedure vital signs: reviewed and stable  Pain management: adequate  Airway patency: patent  PONV status at discharge: No PONV  Anesthetic complications: no      Cardiovascular status: blood pressure returned to baseline  Respiratory status: unassisted  Hydration status: euvolemic  Follow-up not needed.        Visit Vitals  /71 (BP Location: Left arm, Patient Position: Lying)   Pulse 84   Temp 36.2 °C (97.2 °F) (Skin)   Resp 14   Ht 5' 4" (1.626 m)   Wt 93.9 kg (207 lb)   SpO2 100%   Breastfeeding? No   BMI 35.53 kg/m²       Pain/Philippe Score: Pain Assessment Performed: Yes (10/3/2017  2:08 PM)  Presence of Pain: complains of pain/discomfort (10/3/2017  2:08 PM)      "

## 2017-10-03 NOTE — TRANSFER OF CARE
"Anesthesia Transfer of Care Note    Patient: Vida Ornelas    Procedure(s) Performed: Procedure(s) (LRB):  ESOPHAGOGASTRODUODENOSCOPY (EGD) (N/A)    Patient location: PACU    Anesthesia Type: general    Transport from OR: Transported from OR on room air with adequate spontaneous ventilation    Post pain: adequate analgesia    Post assessment: no apparent anesthetic complications    Post vital signs: stable    Level of consciousness: awake    Nausea/Vomiting: no nausea/vomiting    Complications: none    Transfer of care protocol was followed      Last vitals:   Visit Vitals  BP (!) 170/77   Pulse 60   Temp 36.4 °C (97.5 °F)   Resp 16   Ht 5' 4" (1.626 m)   Wt 93.9 kg (207 lb)   SpO2 100%   Breastfeeding? No   BMI 35.53 kg/m²     "

## 2017-10-03 NOTE — H&P
History & Physical - Short Stay  Gastroenterology      SUBJECTIVE:     Procedure: Gastroscopy    Chief Complaint/Indication for Procedure:  Heartburn.  Abdo pain.  Dysphagia.    History of Present Illness:  Office Visit     9/25/2017  [place] - Gastroenterology   PEGGY [facility]   Gastroenterology   Epigastric pain +8 more   Dx   Abdominal Pain    Reason for Visit    Progress Notes        Subjective:       Patient ID: Vida Ornelas is a 62 y.o. female Body mass index is 35.61 kg/m².     Chief Complaint: Abdominal Pain (stomach burning )     Established patient of Dr. Davis & myself.     Gastroesophageal Reflux   She complains of abdominal pain, belching (after eating, frequently), coughing (clear sputum), dysphagia (recurred; feels like food gets stuck in throat; relieved with EGD with dilation in the past) and heartburn. She reports no chest pain, no choking, no globus sensation, no hoarse voice, no nausea, no sore throat or no water brash. This is a chronic problem. Episode onset: years. The problem occurs frequently. The problem has been unchanged (worsened 2 weeks ago, improving since she restarted omeprazole). The heartburn wakes (occasional) her from sleep. The symptoms are aggravated by stress and certain foods (red sauce, spicy foods). Associated symptoms include fatigue. Pertinent negatives include no melena or weight loss (stable lately, trying to lose weight). Risk factors include caffeine use, obesity, NSAIDs and smoking/tobacco exposure (nsaids has held off recently- usually takes celebrex prn for arthritis; takes arava daily for RA; former smoker). She has tried a PPI and a histamine-2 antagonist (celebrex prn- stopped 2 weeks ago when abdominal pain recurred; restarted omeprazole 40 mg once daily on 9/18/17- significant relief; zantac 300 mg once daily; carafate 1 gram qid PAST: dexilant) for the symptoms. The treatment provided significant relief. Past procedures include an abdominal  "ultrasound, an EGD (2015) and a UGI.   Abdominal Pain   This is a recurrent problem. Episode onset: recurred about 2 weeks ago. Episode frequency: with eating. The pain is located in the epigastric region. The pain is at a severity of 5/10. The quality of the pain is burning (bloating). The abdominal pain does not radiate. Associated symptoms include belching (after eating, frequently). Pertinent negatives include no anorexia, constipation, diarrhea, dysuria, fever, frequency, hematochezia, melena, nausea, vomiting or weight loss (stable lately, trying to lose weight). The pain is aggravated by eating. Treatments tried: celebrex prn- stopped 2 weeks ago when abdominal pain recurred; restarted omeprazole 40 mg once daily on 9/18/17- significant relief; zantac 300 mg once daily; carafate 1 gram qid. Prior diagnostic workup includes upper endoscopy and ultrasound. Her past medical history is significant for GERD, irritable bowel syndrome and PUD. There is no history of Crohn's disease, gallstones, pancreatitis or ulcerative colitis.     4/15/16 EGD was reviewed and procedure report states:   " Impression:          - Normal oropharynx.                       - Normal esophagus.                       - Z-line regular, 35 cm from the incisors.                       - Non-erosive esophageal reflux (NERD) disease?.                       - No endoscopic esophageal abnormality to explain                        patient's dysphagia. Esophagus dilated, 51 Fr.                       - Antral mucosal atrophy. Biopsied.                       - Normal stomach otherwise.                       - Normal examined duodenum.                       - Normal examined jejunum.  Recommendation:      - Discharge patient to home.                       - Observe patient's clinical course following                        today's procedure with therapeutic intervention.                       - Await CLOtest results.                       - Continue " "present medications.                       - Use Dexilant (dexlansoprazole) 60 mg PO daily.                       - Use sucralfate tablets 1 gram PO QID.                       - Use dicyclomine 10 mg PO QID; 30 min AC and HS.                       - Call the G.I. clinic in 2 weeks for reports (if                        you haven't heard from us sooner) [phone].                       - Return to GI clinic in 6-8 weeks.                       - If shows evidence of blood in stool, consider next                        perform a video capsule study. ".  Biopsy results:    "jaclyn test negative"    Assessment:       1. Epigastric pain    2. Gastroesophageal reflux disease without esophagitis    3. History of peptic ulcer    4. Occult blood positive stool    5. Esophageal dysmotility    6. Encounter for monitoring long-term proton pump inhibitor therapy    7. Pharyngoesophageal dysphagia    8. Gastroesophageal reflux disease, esophagitis presence not specified    9. Cough        Plan:       Epigastric pain  -     Occult blood x 1, stool; Future; Expected date: 09/25/2017  -     Occult blood x 1, stool; Future; Expected date: 09/25/2017  -     Occult blood x 1, stool; Future; Expected date: 09/25/2017  - schedule EGD, discussed procedure with patient, patient verbalized understanding  -  continue   dicyclomine (BENTYL) 10 MG capsule; Take 1 capsule (10 mg total) by mouth every 6 (six) hours as needed. Abdominal pain/cramping      History of peptic ulcer  -     Occult blood x 1, stool; Future; Expected date: 09/25/2017  -     Occult blood x 1, stool; Future; Expected date: 09/25/2017  -     Occult blood x 1, stool; Future; Expected date: 09/25/2017  - schedule EGD, discussed procedure with patient, patient verbalized understanding     Occult blood positive stool  -     Occult blood x 1, stool; Future; Expected date: 09/25/2017  -     Occult blood x 1, stool; Future; Expected date: 09/25/2017  -     Occult blood x 1, stool; " Future; Expected date: 09/25/2017  - schedule EGD, discussed procedure with patient, patient verbalized understanding  - possible video capsule study pending results of testing and if symptoms persist     Encounter for monitoring long-term proton pump inhibitor therapy  -     Magnesium; Future; Expected date: 09/25/2017  -     Vitamin B12; Future; Expected date: 09/25/2017  - discussed with patient about long term use of reflux medications and the risk of using these medications long term. Some research studies (not all) have shown decrease absorption of calcium when taking PPI's and H2 blockers, but those same research studies showed that adequate dietary (milk and cheese) and/or supplement of calcium and vitamin d supplement induces enough acid secretion for calcium absorption. Also, discussed about the risk vs benefit of untreated GERD such as martinez's esophagus.  - recommend annual monitoring with blood work to include CMP, CBC, vitamin B12, and magnesium.     Esophageal dysmotility & Pharyngoesophageal dysphagia  - schedule EGD, discussed procedure with patient and possible esophageal dilation may be performed during procedure if indicated, patient verbalized understanding  - educated patient to eat smaller more frequent meals and to eat slowly and advised to eat a soft diet.  - possible esophageal manometry if symptoms persist  - possible trial of levsin     Gastroesophageal reflux disease without esophagitis  -  REFILL   omeprazole (PRILOSEC) 40 MG capsule; Take 1 capsule (40 mg total) by mouth once daily.  Dispense: 30 capsule; Refill: 3,  take in the morning before breakfast, discussed about possible long term use of medication, pt verbalized understanding  -discussed the different types of medications used to treat reflux and how to use them, antacids can be used PRN for breakthrough heartburn symptoms by reducing stomach acid that is already produced, H2 blockers (zantac) work by limiting the amount acid  production, & PPI's work to block acid production and are taken daily, patient verbalized understanding.  - continue zantac 300 mg once daily as directed  - continue lifestyle modifications to help control reflux including: avoid large meals, avoid eating within 2-3 hours of bedtime (avoid late night eating & lying down soon after eating), elevate head of bed if nocturnal symptoms are present, & weight loss.   - avoid known foods which trigger reflux symptoms & to minimize/avoid high-fat foods, chocolate, caffeine, citrus, alcohol, & tomato products.  - avoid/limit use of NSAID's, since they can cause GI upset, bleeding, and/or ulcers. If needed, take with food.  -  continue   sucralfate (CARAFATE) 1 gram tablet; Take 1 tablet (1 g total) by mouth 4 (four) times daily before meals and nightly.    - schedule EGD, discussed procedure with patient, patient verbalized understanding     Cough  Recommend follow-up with Primary Care Provider for continued evaluation and management.     History of IBS  -  continue   dicyclomine (BENTYL) 10 MG capsule; Take 1 capsule (10 mg total) by mouth every 6 (six) hours as needed. Abdominal pain/cramping   - recommended OTC probiotics, such as Align or Culturelle, as directed  - avoid lactose  - drink adequate water intake  -smaller, more frequent meals  -avoid trigger foods  - possible trial of levsin     Return in about 1 month (around 10/25/2017), or if symptoms worsen or fail to improve.    If no improvement in symptoms or symptoms worsen, call/follow-up at clinic or go to ER.              UGI 3/22/2016:  There is disordered esophageal motility with disordered propagation of the primary and secondary peristaltic waves.  The esophagus is normal in caliber and its mucosal pattern shows no evidence of esophagitis, mass, stricture, diverticulum, or polyp formation.  No hiatal hernia.  There is severe gastroesophageal reflux with provocative maneuvering.    The stomach shows a normal  contour.  No abnormal gastric fold thickening, ulcer formation, or definite gastric mass appreciated.  The duodenal C-loop is unremarkable.    Oral contrast material moved rapidly through the small bowel reaching the colon at approximately 15 minutes.  The small bowel mucosal pattern is normal with no evidence of abnormal fold thickening, diverticulum, stricture, mass, or obstruction.  The terminal ileum is normal in appearance.  The appendix opacified with contrast material.    The total fluoroscopy time was 3.7 minutes   Impression     1.  Esophageal dysmotility.    2.  Severe gastroesophageal reflux disease.           PTA Medications   Medication Sig    celecoxib (CELEBREX) 200 MG capsule Take 200 mg by mouth once daily.     cyanocobalamin 1,000 mcg/mL injection INJECT 1ML INTO THE SKIN EVERY 7 DAYS    dicyclomine (BENTYL) 10 MG capsule Take 10 mg by mouth every 6 (six) hours as needed.    fish oil-omega-3 fatty acids 300-1,000 mg capsule Take 2 g by mouth once daily.    hydrocodone-acetaminophen 10-325mg (NORCO)  mg Tab Take 1 tablet by mouth.    leflunomide (ARAVA) 20 MG Tab Take 1 tablet (20 mg total) by mouth once daily.    levothyroxine (SYNTHROID) 25 MCG tablet TAKE ONE TABLET BY MOUTH EVERY MORNING before breakfast and hold all food and meds for 30 minutes after taking    lidocaine HCL 2% (XYLOCAINE) 2 % jelly Apply ONE a small amount to affected area three times a day as needed    multivitamin (ONE DAILY MULTIVITAMIN) per tablet Take 1 tablet by mouth once daily.    omeprazole (PRILOSEC) 40 MG capsule Take 1 capsule (40 mg total) by mouth once daily.    ranitidine (ZANTAC) 300 MG tablet TAKE ONE TABLET BY MOUTH IN THE EVENING 30-60 MINUTES before bedtime    sucralfate (CARAFATE) 1 gram tablet Take 1 g by mouth 4 (four) times daily before meals and nightly.     VOLTAREN 1 % Gel        Review of patient's allergies indicates:   Allergen Reactions    Ibuprofen Diarrhea     Stomach  pains  States can take    Nifedipine      Pt does not remember reaction    Plaquenil [hydroxychloroquine] Diarrhea    Tacrolimus      Pt does not remember reaction    Penicillins Itching        Past Medical History:   Diagnosis Date    Arthritis     Seronegative rheumatoid arthritis    Back pain     s/p ena     Colon polyp     ESR raised     Former smoker     GERD with stricture     Hyperlipidemia     Hypothyroidism     Irritable bowel syndrome     Peptic ulcer disease     RA (rheumatoid arthritis)     Vitamin D deficiency      Past Surgical History:   Procedure Laterality Date    COLONOSCOPY  6/15/2015    Dr. lepe: colonic spasm consistent with IBS, otherwise normal findings; repeat in 5 years for surveillance due to family history of colon cancer in father    COLONOSCOPY W/ POLYPECTOMY  ? ?  (Stevie)    Benign polyps removed.    ESOPHAGOGASTRODUODENOSCOPY  early ??    ESOPHAGOSCOPY W/ DILATION  2015  [facility]    Non-erosive esophageal reflux (NERD) disease?.  Dysphagia, esophageal spasm?   Esophagus dilated, 51 Fr.  Antral erythema.  DAYNA Test  Negative.    HYSTERECTOMY      JOINT REPLACEMENT      TOTAL HIP ARTHROPLASTY Right 2015    UPPER GASTROINTESTINAL ENDOSCOPY  2015    UPPER GASTROINTESTINAL ENDOSCOPY      dilation    UPPER GASTROINTESTINAL ENDOSCOPY  04/15/2016    Dr. Lepe     Family History   Problem Relation Age of Onset    Rheum arthritis Mother     Arthritis Mother      rheumatoid arthiritis    Lupus Sister     Hypertension Sister     Arthritis Sister      rheumatoid arthritis    Cancer Father      colon CA    Arthritis Father      rheumatoid arthritis    Colon cancer Father      unsure of age of diagnosis,  at 85 years old    Breast cancer Neg Hx     Crohn's disease Neg Hx     Ulcerative colitis Neg Hx     Stomach cancer Neg Hx     Esophageal cancer Neg Hx      Social History   Substance Use Topics    Smoking status: Former Smoker     Quit  "date: 6/12/1975    Smokeless tobacco: Never Used    Alcohol use No         OBJECTIVE:     Vital Signs (Most Recent)  Temp: 97.5 °F (36.4 °C) (10/03/17 1406)  Pulse: 60 (10/03/17 1406)  Resp: 16 (10/03/17 1406)  BP: (!) 170/77 (10/03/17 1406)  SpO2: 100 % (10/03/17 1406)    Physical Exam:             :   Ht 5' 4" (1.626 m)    Wt 94.1 kg (207 lb 7.3 oz)    BMI 35.61 kg/m²                     GENERAL:  Comfortable, in no acute distress.                                 HEENT EXAM:  Nonicteric.  No adenopathy.  Oropharynx is clear.               NECK:  Supple.                                                               LUNGS:  Clear.                                                               CARDIAC:  Regular rate and rhythm.  S1, S2.  No murmur.                      ABDOMEN:  Soft, positive bowel sounds, nontender.  No hepatosplenomegaly or masses.  No rebound or guarding.                                             EXTREMITIES:  No edema.     MENTAL STATUS:  Alert and oriented.    ASSESSMENT/PLAN:     Assessment:  Heartburn.  Abdo pain.  Dysphagia.    Plan: Gastroscopy    Anesthesia Plan:   MAC / General Anaesthesia    ASA Grade: ASA 2 - Patient with mild systemic disease with no functional limitations    MALLAMPATI SCORE: II (hard and soft palate, upper portion of tonsils anduvula visible)      "

## 2017-10-04 ENCOUNTER — LAB VISIT (OUTPATIENT)
Dept: LAB | Facility: HOSPITAL | Age: 62
End: 2017-10-04
Attending: NURSE PRACTITIONER
Payer: MEDICARE

## 2017-10-04 DIAGNOSIS — Z87.11 HISTORY OF PEPTIC ULCER: ICD-10-CM

## 2017-10-04 DIAGNOSIS — R19.5 OCCULT BLOOD POSITIVE STOOL: ICD-10-CM

## 2017-10-04 DIAGNOSIS — R10.13 EPIGASTRIC PAIN: ICD-10-CM

## 2017-10-04 PROCEDURE — 82272 OCCULT BLD FECES 1-3 TESTS: CPT

## 2017-10-05 LAB
OB PNL STL: NEGATIVE

## 2017-10-11 ENCOUNTER — OFFICE VISIT (OUTPATIENT)
Dept: INTERNAL MEDICINE | Facility: CLINIC | Age: 62
End: 2017-10-11
Payer: MEDICARE

## 2017-10-11 VITALS
HEART RATE: 64 BPM | SYSTOLIC BLOOD PRESSURE: 129 MMHG | HEIGHT: 64 IN | DIASTOLIC BLOOD PRESSURE: 89 MMHG | WEIGHT: 209 LBS | BODY MASS INDEX: 35.68 KG/M2
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
## 2017-10-11 DIAGNOSIS — Z51.81 MEDICATION MONITORING ENCOUNTER: ICD-10-CM

## 2017-10-11 DIAGNOSIS — R53.83 FATIGUE, UNSPECIFIED TYPE: ICD-10-CM

## 2017-10-11 DIAGNOSIS — Z12.31 ENCOUNTER FOR SCREENING MAMMOGRAM FOR MALIGNANT NEOPLASM OF BREAST: ICD-10-CM

## 2017-10-11 DIAGNOSIS — Z00.00 HEALTH CARE MAINTENANCE: ICD-10-CM

## 2017-10-11 DIAGNOSIS — Z13.6 ENCOUNTER FOR SCREENING FOR CARDIOVASCULAR DISORDERS: ICD-10-CM

## 2017-10-11 PROCEDURE — 99499 UNLISTED E&M SERVICE: CPT | Mod: S$GLB,,, | Performed by: INTERNAL MEDICINE

## 2017-10-11 PROCEDURE — 99396 PREV VISIT EST AGE 40-64: CPT | Mod: S$GLB,,, | Performed by: INTERNAL MEDICINE

## 2017-10-11 PROCEDURE — 99999 PR PBB SHADOW E&M-EST. PATIENT-LVL III: CPT | Mod: PBBFAC,,, | Performed by: INTERNAL MEDICINE

## 2017-10-11 RX ORDER — LOSARTAN POTASSIUM 25 MG/1
25 TABLET ORAL DAILY
Qty: 30 TABLET | Refills: 1 | Status: SHIPPED | OUTPATIENT
Start: 2017-10-11 | End: 2017-11-09 | Stop reason: SDUPTHER

## 2017-10-11 NOTE — PROGRESS NOTES
HISTORY OF PRESENT ILLNESS:  PtRenaldo is a 62 y.o. female presents for monitoring of her RA, hypothyroidism.  She is followed by Dr. Kenyon for her RA, she is on arava, last CBC done 4 months ago did not show any neutropenia.  Chemistry panel 5 months ago showed hypoalbuminemia.  She has had a normal protein electrophoresis.  She has had a hysterectomy, and is due for mammogram..  She is due for her immunizations, needs hep C screening.  Colonoscopy was done 6/15/15.  She had EGD for dysphagia, but only gastritis found.  HTN is at upper limits of acceptance today.  She states home B/Ps go form 60s-80s.  She had TSH that had double from the last year.  Will not give shingles vaccine, as she is on arava.    Lab Results   Component Value Date    WBC 6.41 06/27/2017    HGB 12.2 06/27/2017    HCT 37.8 06/27/2017     06/27/2017    CHOL 209 (H) 08/22/2016    TRIG 71 08/22/2016    HDL 56 08/22/2016    ALT 18 05/01/2017    AST 18 05/01/2017     05/01/2017    K 3.9 05/01/2017     05/01/2017    CREATININE 0.7 05/01/2017    BUN 15 05/01/2017    CO2 28 05/01/2017    TSH 2.235 06/27/2017    HGBA1C 5.5 05/19/2010     Lab Results   Component Value Date    LDLCALC 138.8 08/22/2016             ROS:  GENERAL: No fever, chills, positive fatigability; no real weight loss.  SKIN: No rashes, itching or changes in color or texture of skin.  HEAD: No headaches or recent head trauma.  EARS: Denies ear pain, discharge or vertigo.  NOSE: No loss of smell, no epistaxis or postnasal drip.  MOUTH & THROAT: No hoarseness or change in voice. No excessive gum bleeding.  NODES: Denies swollen glands.  CHEST: Denies COVARRUBIAS, cyanosis, wheezing, cough and sputum production.  CARDIOVASCULAR: Denies chest pain, PND, orthopnea or reduced exercise tolerance.  ABDOMEN: Appetite fine. No weight loss. Denies constipation, diarrhea, occ abdominal pain, no hematemesis or blood in stool; denies nausea;   URINARY: No flank pain, dysuria or  hematuria.  PERIPHERAL VASCULAR: No claudication or cyanosis. No edema.  MUSCULOSKELETAL: Positive joint stiffness to right hip and knee; no swelling. Denies back pain.  NEUROLOGIC: Denies numbness    PE:   Vitals:   Vitals:    10/11/17 1050   BP: 129/89   Pulse: 64     GENERAL: no acute distress, A&Ox3, comfortable.  Female with BMI of 35   HEENT: tympanic membranes clear, nasal mucosa pink, no pharyngeal erythema or exudate  NECK: supple, no cervical lymphadenopathy, no thyromegaly; no supraclavicular nodes;   CHEST:  clear to auscultation bilaterally, no crackles or wheeze; no increased work of breathing;  CARDIOVASCULAR: regular rate and rhythm, no rubs, murmurs or gallops.  ABDOMEN: normal bowel sounds, soft non-tender, non-distended; no palpable organomegaly;   EXT: no clubbing, cyanosis or edema.     ASSESSMENT/PLAN:    Procedure causing abnormal patient reaction, or later complication  -     Ambulatory consult to Gastroenterology    Fatigue, unspecified type  -     TSH; Future; Expected date: 10/11/2017    Medication monitoring encounter  -     Comprehensive metabolic panel; Future; Expected date: 10/11/2017    Health care maintenance  -     Lipid panel; Future; Expected date: 11/11/2017  -     Mammo Digital Screening Bilat with CAD; Future; Expected date: 11/11/2017  -     Hepatitis C antibody; Future; Expected date: 10/11/2017    Encounter for screening for cardiovascular disorders   -     Lipid panel; Future; Expected date: 11/11/2017    Encounter for screening mammogram for malignant neoplasm of breast   -     Mammo Digital Screening Bilat with CAD; Future; Expected date: 11/11/2017    Other orders  -     losartan (COZAAR) 25 MG tablet; Take 1 tablet (25 mg total) by mouth once daily.  Dispense: 30 tablet; Refill: 1      Call if condition changes or worsens.

## 2017-10-12 ENCOUNTER — HOSPITAL ENCOUNTER (OUTPATIENT)
Dept: RADIOLOGY | Facility: HOSPITAL | Age: 62
Discharge: HOME | End: 2017-10-12
Attending: INTERNAL MEDICINE
Payer: MEDICARE

## 2017-10-12 VITALS — BODY MASS INDEX: 35.51 KG/M2 | WEIGHT: 208 LBS | HEIGHT: 64 IN

## 2017-10-12 DIAGNOSIS — Z12.31 ENCOUNTER FOR SCREENING MAMMOGRAM FOR MALIGNANT NEOPLASM OF BREAST: ICD-10-CM

## 2017-10-12 DIAGNOSIS — Z00.00 HEALTH CARE MAINTENANCE: ICD-10-CM

## 2017-10-12 PROCEDURE — 77063 BREAST TOMOSYNTHESIS BI: CPT | Mod: 26,,, | Performed by: RADIOLOGY

## 2017-10-12 PROCEDURE — 77067 SCR MAMMO BI INCL CAD: CPT | Mod: TC

## 2017-10-12 PROCEDURE — 77067 SCR MAMMO BI INCL CAD: CPT | Mod: 26,,, | Performed by: RADIOLOGY

## 2017-10-13 DIAGNOSIS — E78.5 HYPERLIPIDEMIA, UNSPECIFIED HYPERLIPIDEMIA TYPE: Primary | ICD-10-CM

## 2017-10-13 RX ORDER — LEVOTHYROXINE SODIUM 25 UG/1
TABLET ORAL
Qty: 30 TABLET | Refills: 11 | Status: SHIPPED | OUTPATIENT
Start: 2017-10-13 | End: 2017-11-09 | Stop reason: SDUPTHER

## 2017-10-13 RX ORDER — PRAVASTATIN SODIUM 20 MG/1
20 TABLET ORAL DAILY
Qty: 30 TABLET | Refills: 1 | Status: SHIPPED | OUTPATIENT
Start: 2017-10-13 | End: 2017-11-09 | Stop reason: SDUPTHER

## 2017-10-17 ENCOUNTER — TELEPHONE (OUTPATIENT)
Dept: FAMILY MEDICINE | Facility: CLINIC | Age: 62
End: 2017-10-17

## 2017-10-17 NOTE — TELEPHONE ENCOUNTER
Phoned pt in regards to lab results and RX simvastatin per Dr Leone's instructions. Also scheduled for 1 month repeat lipid lab test. Pt voiced understanding for all instructions and appt. CLC

## 2017-10-17 NOTE — TELEPHONE ENCOUNTER
----- Message from Soledad Leone MD sent at 10/17/2017  7:42 AM CDT -----  Please make sure pt got message about the simvastatin.

## 2017-10-28 ENCOUNTER — OFFICE VISIT (OUTPATIENT)
Dept: OPTOMETRY | Facility: CLINIC | Age: 62
End: 2017-10-28
Payer: MEDICARE

## 2017-10-28 DIAGNOSIS — H40.033 ANATOMICAL NARROW ANGLE, BILATERAL: ICD-10-CM

## 2017-10-28 DIAGNOSIS — H25.13 NUCLEAR SCLEROSIS OF BOTH EYES: ICD-10-CM

## 2017-10-28 DIAGNOSIS — H10.13 ALLERGIC CONJUNCTIVITIS, BILATERAL: Primary | ICD-10-CM

## 2017-10-28 DIAGNOSIS — H52.4 BILATERAL PRESBYOPIA: ICD-10-CM

## 2017-10-28 PROCEDURE — 92020 GONIOSCOPY: CPT | Mod: S$GLB,,, | Performed by: OPTOMETRIST

## 2017-10-28 PROCEDURE — 92004 COMPRE OPH EXAM NEW PT 1/>: CPT | Mod: S$GLB,,, | Performed by: OPTOMETRIST

## 2017-10-28 PROCEDURE — 99999 PR PBB SHADOW E&M-EST. PATIENT-LVL III: CPT | Mod: PBBFAC,,, | Performed by: OPTOMETRIST

## 2017-10-28 NOTE — LETTER
October 28, 2017      Jayna Rodriguez, NP  [address] [facility] [address] [place] [address]           [facility]  [address] [facility] [address] [place] [address]  Phone: [phone]  Fax: [phone]          Patient: Vida Ornelas   MR Number: [id number]   YOB: 1955   Date of Visit: 10/28/2017       Dear Jayna Rodriguez:    Thank you for referring Vida Ornelas to me for evaluation. Attached you will find relevant portions of my assessment and plan of care.    If you have questions, please do not hesitate to call me. I look forward to following Vida Ornelas along with you.    Sincerely,    Almas Tony, OD    Enclosure  CC:  No Recipients    If you would like to receive this communication electronically, please contact [email or web address] or [phone] to request more information on [facility] access.    For providers and/or their staff who would like to refer a patient to [facility], please contact us through our one-stop-shop provider referral line, [facility], at [phone].    If you feel you have received this communication in error or would no longer like to receive these types of communications, please e-mail [email or web address]

## 2017-10-28 NOTE — PROGRESS NOTES
"HPI     Eye Problem    Additional comments: "OD runs water all the time" x a while//           Comments   "OD runs water all the time" x a while//  No blurred va // uses no glasses for distance but uses readers to read//  No flashes or floaters noted//  Pt states sister has a macula hole//  Pt states has problems driving at night , just the darkness not glare//       Last edited by Lashawn Viveros on 10/28/2017 10:12 AM. (History)            Assessment /Plan     For exam results, see Encounter Report.    Allergic conjunctivitis, bilateral    Nuclear sclerosis of both eyes    Bilateral presbyopia    Anatomical narrow angle, bilateral      1. Causing tearing, Recommended OTC Zaditor bid OU for tearing. Call back if no better.  2. Educated pt on presence of cataracts and effects on vision. No surgery at this time. Recheck in one year.  3. OTC +2.50 readers.  4. IOP low and open with gonio. Monitor condition. Patient to report any changes. RTC 1 year recheck.             "

## 2017-11-01 ENCOUNTER — OFFICE VISIT (OUTPATIENT)
Dept: RHEUMATOLOGY | Facility: CLINIC | Age: 62
End: 2017-11-01
Payer: MEDICARE

## 2017-11-01 VITALS
SYSTOLIC BLOOD PRESSURE: 130 MMHG | WEIGHT: 211 LBS | BODY MASS INDEX: 36.02 KG/M2 | DIASTOLIC BLOOD PRESSURE: 80 MMHG | HEART RATE: 72 BPM | HEIGHT: 64 IN

## 2017-11-01 DIAGNOSIS — M25.552 HIP PAIN, LEFT: ICD-10-CM

## 2017-11-01 DIAGNOSIS — R70.0 ESR RAISED: ICD-10-CM

## 2017-11-01 DIAGNOSIS — M06.9 RHEUMATOID ARTHRITIS OF HAND, UNSPECIFIED LATERALITY, UNSPECIFIED RHEUMATOID FACTOR PRESENCE: Primary | ICD-10-CM

## 2017-11-01 DIAGNOSIS — R53.82 CHRONIC FATIGUE: ICD-10-CM

## 2017-11-01 PROCEDURE — 99499 UNLISTED E&M SERVICE: CPT | Mod: S$GLB,,, | Performed by: INTERNAL MEDICINE

## 2017-11-01 PROCEDURE — 99214 OFFICE O/P EST MOD 30 MIN: CPT | Mod: S$GLB,,, | Performed by: INTERNAL MEDICINE

## 2017-11-01 PROCEDURE — 99999 PR PBB SHADOW E&M-EST. PATIENT-LVL III: CPT | Mod: PBBFAC,,, | Performed by: INTERNAL MEDICINE

## 2017-11-01 NOTE — PROGRESS NOTES
Subjective:       Patient ID: Vida Ornelas is a 62 y.o. female.    Chief Complaint: Disease Management    RA: on arava 20  On nov 27 will have a  Hip replacement. Patient complains of arthralgias and myalgias for which has been present for a few years. Pain is located in multiple joints, both shoulder(s), both elbow(s), both wrist(s), both MCP(s): 1st, 2nd, 3rd, 4th and 5th, both PIP(s): 1st, 2nd, 3rd, 4th and 5th, both DIP(s): 1st and 2nd, both hip(s), both knee(s) and both MTP(s): 1st, 2nd, 3rd, 4th and 5th, is described as aching, pulsating, shooting and throbbing, and is constant, moderate .  Associated symptoms include: crepitation, decreased range of motion, edema, effusion, tenderness and warmth.         Review of Systems   Constitutional: Negative for activity change, appetite change and unexpected weight change.   HENT: Negative for dental problem, ear discharge, ear pain, facial swelling, mouth sores, nosebleeds, postnasal drip, rhinorrhea, sinus pressure, sneezing, tinnitus and voice change.    Eyes: Negative for photophobia, pain, discharge, redness and itching.   Respiratory: Negative for apnea, chest tightness, shortness of breath and wheezing.    Cardiovascular: Negative for palpitations and leg swelling.   Gastrointestinal: Negative for abdominal distention, constipation and diarrhea.   Endocrine: Negative for cold intolerance, heat intolerance, polydipsia and polyuria.   Genitourinary: Negative for decreased urine volume, difficulty urinating, flank pain, frequency, hematuria and urgency.   Musculoskeletal: Negative for back pain, gait problem and neck stiffness.   Skin: Negative for pallor and wound.   Allergic/Immunologic: Negative for immunocompromised state.   Neurological: Negative for dizziness and tremors.   Hematological: Negative for adenopathy. Does not bruise/bleed easily.   Psychiatric/Behavioral: Negative for sleep disturbance. The patient is not nervous/anxious.       "    Objective:     /80   Pulse 72   Ht 5' 4" (1.626 m)   Wt 95.7 kg (210 lb 15.7 oz)   BMI 36.21 kg/m²      Physical Exam   Nursing note and vitals reviewed.  Constitutional: She is oriented to person, place, and time and well-developed, well-nourished, and in no distress.   HENT:   Head: Normocephalic and atraumatic.   Mouth/Throat: Oropharynx is clear and moist.   Eyes: EOM are normal. Pupils are equal, round, and reactive to light.   Neck: Neck supple. No thyromegaly present.   Cardiovascular: Normal rate, regular rhythm and normal heart sounds.  Exam reveals no gallop and no friction rub.    No murmur heard.  Pulmonary/Chest: Breath sounds normal. She has no wheezes. She has no rales. She exhibits no tenderness.   Abdominal: There is no tenderness. There is no rebound and no guarding.       Right Side Rheumatological Exam     The patient is tender to palpation of the shoulder, wrist, knee, 1st PIP, 1st MCP, 2nd PIP, 2nd MCP, 3rd PIP, 3rd MCP, 4th PIP, 4th MCP, 5th PIP and 5th MCP    She has swelling of the shoulder, elbow, wrist and knee    Shoulder Exam   Tenderness Location: no tenderness    Range of Motion   Active Abduction: abnormal   Adduction: abnormal  Sensation: normal    Knee Exam   Patellofemoral Crepitus: positive  Effusion: positive  Sensation: normal    Hip Exam   Tenderness Location: posterior and anterior    Range of Motion   Internal Rotation: abnormal   External Rotation: abnormal   Sensation: normal    Elbow/Wrist Exam   Tenderness Location: no tenderness  Sensation: normal    Left Side Rheumatological Exam     Examination finds the elbow normal.    The patient is tender to palpation of the wrist, knee, 1st PIP, 1st MCP, 2nd PIP, 2nd MCP, 3rd PIP, 3rd MCP, 4th PIP, 4th MCP, 5th PIP and 5th MCP.    She has swelling of the shoulder, wrist and knee    Shoulder Exam   Tenderness Location: no tenderness    Range of Motion   Active Abduction: abnormal   Sensation: normal    Knee Exam "     Patellofemoral Crepitus: negative  Effusion: negative  Sensation: normal    Hip Exam   Tenderness Location: no tenderness  Sensation: normal    Elbow/Wrist Exam   Sensation: normal      Back/Neck Exam   Tenderness Right paramedian tenderness of the Lower C-Spine and Upper C-Spine.Left paramedian tenderness of the Lower C-Spine and Upper C-Spine.      Lymphadenopathy:     She has no cervical adenopathy.   Neurological: She is alert and oriented to person, place, and time. Gait normal.   Skin: No rash noted. No erythema. No pallor.     Psychiatric: Mood and affect normal.   Musculoskeletal: She exhibits tenderness and deformity. She exhibits no edema.          Results for orders placed or performed in visit on 10/12/17   Comprehensive metabolic panel   Result Value Ref Range    Sodium 138 136 - 145 mmol/L    Potassium 4.5 3.5 - 5.1 mmol/L    Chloride 103 95 - 110 mmol/L    CO2 25 23 - 29 mmol/L    Glucose 102 70 - 110 mg/dL    BUN, Bld 16 6 - 20 mg/dL    Creatinine 0.8 0.5 - 1.4 mg/dL    Calcium 10.2 8.7 - 10.5 mg/dL    Total Protein 7.7 6.0 - 8.4 g/dL    Albumin 3.5 3.5 - 5.2 g/dL    Total Bilirubin 0.6 0.1 - 1.0 mg/dL    Alkaline Phosphatase 96 55 - 135 U/L    AST 21 10 - 40 U/L    ALT 17 10 - 44 U/L    Anion Gap 10 8 - 16 mmol/L    eGFR if African American >60.0 >60 mL/min/1.73 m^2    eGFR if non African American >60.0 >60 mL/min/1.73 m^2   Lipid panel   Result Value Ref Range    Cholesterol 268 (H) 120 - 199 mg/dL    Triglycerides 59 30 - 150 mg/dL    HDL 72 40 - 75 mg/dL    LDL Cholesterol 184.2 (H) 63.0 - 159.0 mg/dL    HDL/Chol Ratio 26.9 20.0 - 50.0 %    Total Cholesterol/HDL Ratio 3.7 2.0 - 5.0    Non-HDL Cholesterol 196 mg/dL   TSH   Result Value Ref Range    TSH 2.459 0.400 - 4.000 uIU/mL   Hepatitis C antibody   Result Value Ref Range    Hepatitis C Ab Negative          Assessment:       No diagnosis found.      Plan:     Rheumatoid arthritis of hand, unspecified laterality, unspecified rheumatoid  factor presence  -     CBC auto differential; Future; Expected date: 11/01/2017  -     Comprehensive metabolic panel; Future; Expected date: 11/01/2017  -     C-reactive protein; Future; Expected date: 11/01/2017  -     Sedimentation rate, manual; Future; Expected date: 11/01/2017    ESR raised  -     CBC auto differential; Future; Expected date: 11/01/2017  -     Comprehensive metabolic panel; Future; Expected date: 11/01/2017  -     C-reactive protein; Future; Expected date: 11/01/2017  -     Sedimentation rate, manual; Future; Expected date: 11/01/2017    Hip pain, left  -     CBC auto differential; Future; Expected date: 11/01/2017  -     Comprehensive metabolic panel; Future; Expected date: 11/01/2017  -     C-reactive protein; Future; Expected date: 11/01/2017  -     Sedimentation rate, manual; Future; Expected date: 11/01/2017    Chronic fatigue  -     CBC auto differential; Future; Expected date: 11/01/2017  -     Comprehensive metabolic panel; Future; Expected date: 11/01/2017  -     C-reactive protein; Future; Expected date: 11/01/2017  -     Sedimentation rate, manual; Future; Expected date: 11/01/2017      Pt is cleared for surgery, she will need to hold arava 1 week prior and not to start arava until cleared for surgery

## 2017-11-07 ENCOUNTER — LAB VISIT (OUTPATIENT)
Dept: LAB | Facility: HOSPITAL | Age: 62
End: 2017-11-07
Attending: INTERNAL MEDICINE
Payer: MEDICARE

## 2017-11-07 DIAGNOSIS — R70.0 ESR RAISED: ICD-10-CM

## 2017-11-07 DIAGNOSIS — E78.5 HYPERLIPIDEMIA, UNSPECIFIED HYPERLIPIDEMIA TYPE: ICD-10-CM

## 2017-11-07 DIAGNOSIS — R53.82 CHRONIC FATIGUE: ICD-10-CM

## 2017-11-07 DIAGNOSIS — M25.552 HIP PAIN, LEFT: ICD-10-CM

## 2017-11-07 DIAGNOSIS — M06.9 RHEUMATOID ARTHRITIS OF HAND, UNSPECIFIED LATERALITY, UNSPECIFIED RHEUMATOID FACTOR PRESENCE: ICD-10-CM

## 2017-11-07 LAB
ALBUMIN SERPL BCP-MCNC: 3.3 G/DL
ALP SERPL-CCNC: 93 U/L
ALT SERPL W/O P-5'-P-CCNC: 16 U/L
ALT SERPL W/O P-5'-P-CCNC: 16 U/L
ANION GAP SERPL CALC-SCNC: 12 MMOL/L
AST SERPL-CCNC: 17 U/L
AST SERPL-CCNC: 17 U/L
BASOPHILS # BLD AUTO: 0.03 K/UL
BASOPHILS NFR BLD: 0.7 %
BILIRUB SERPL-MCNC: 0.6 MG/DL
BUN SERPL-MCNC: 17 MG/DL
CALCIUM SERPL-MCNC: 9.5 MG/DL
CHLORIDE SERPL-SCNC: 106 MMOL/L
CHOLEST SERPL-MCNC: 219 MG/DL
CHOLEST/HDLC SERPL: 3.3 {RATIO}
CO2 SERPL-SCNC: 25 MMOL/L
CREAT SERPL-MCNC: 0.8 MG/DL
CRP SERPL-MCNC: 0.8 MG/L
DIFFERENTIAL METHOD: ABNORMAL
EOSINOPHIL # BLD AUTO: 0 K/UL
EOSINOPHIL NFR BLD: 0.9 %
ERYTHROCYTE [DISTWIDTH] IN BLOOD BY AUTOMATED COUNT: 13.3 %
ERYTHROCYTE [SEDIMENTATION RATE] IN BLOOD BY WESTERGREN METHOD: 30 MM/HR
EST. GFR  (AFRICAN AMERICAN): >60 ML/MIN/1.73 M^2
EST. GFR  (NON AFRICAN AMERICAN): >60 ML/MIN/1.73 M^2
GLUCOSE SERPL-MCNC: 88 MG/DL
HCT VFR BLD AUTO: 38.8 %
HDLC SERPL-MCNC: 67 MG/DL
HDLC SERPL: 30.6 %
HGB BLD-MCNC: 12.2 G/DL
IMM GRANULOCYTES # BLD AUTO: 0.02 K/UL
IMM GRANULOCYTES NFR BLD AUTO: 0.4 %
LDLC SERPL CALC-MCNC: 141.2 MG/DL
LYMPHOCYTES # BLD AUTO: 1.8 K/UL
LYMPHOCYTES NFR BLD: 39.9 %
MCH RBC QN AUTO: 27.7 PG
MCHC RBC AUTO-ENTMCNC: 31.4 G/DL
MCV RBC AUTO: 88 FL
MONOCYTES # BLD AUTO: 0.4 K/UL
MONOCYTES NFR BLD: 8.8 %
NEUTROPHILS # BLD AUTO: 2.2 K/UL
NEUTROPHILS NFR BLD: 49.3 %
NONHDLC SERPL-MCNC: 152 MG/DL
NRBC BLD-RTO: 0 /100 WBC
PLATELET # BLD AUTO: 178 K/UL
PMV BLD AUTO: 12.8 FL
POTASSIUM SERPL-SCNC: 4.7 MMOL/L
PROT SERPL-MCNC: 6.8 G/DL
RBC # BLD AUTO: 4.4 M/UL
SODIUM SERPL-SCNC: 143 MMOL/L
TRIGL SERPL-MCNC: 54 MG/DL
WBC # BLD AUTO: 4.54 K/UL

## 2017-11-07 PROCEDURE — 36415 COLL VENOUS BLD VENIPUNCTURE: CPT | Mod: PO

## 2017-11-07 PROCEDURE — 80053 COMPREHEN METABOLIC PANEL: CPT

## 2017-11-07 PROCEDURE — 85651 RBC SED RATE NONAUTOMATED: CPT | Mod: PO

## 2017-11-07 PROCEDURE — 86140 C-REACTIVE PROTEIN: CPT

## 2017-11-07 PROCEDURE — 80061 LIPID PANEL: CPT

## 2017-11-07 PROCEDURE — 85025 COMPLETE CBC W/AUTO DIFF WBC: CPT

## 2017-11-08 ENCOUNTER — TELEPHONE (OUTPATIENT)
Dept: RHEUMATOLOGY | Facility: CLINIC | Age: 62
End: 2017-11-08

## 2017-11-08 NOTE — TELEPHONE ENCOUNTER
Spoke with patient and informed of lab results. Patient verbalized understanding. Patient informed nurse will have surgery on the 27th of this month. Dr Kenyon aware. Informed patient to hold arava 1 week before surgery and do not restart until cleared from surgery. Patient verbalized understanding. Nurse advised patient to contact office after cleared from surgery to repeat labs to check sed rate again. Patient verbalized understanding.

## 2017-11-08 NOTE — TELEPHONE ENCOUNTER
----- Message from Baljinder Kenyon MD sent at 11/7/2017  3:26 PM CST -----  Sed rate slightly elevated

## 2017-11-09 RX ORDER — LOSARTAN POTASSIUM 25 MG/1
TABLET ORAL
Qty: 30 TABLET | Refills: 11 | Status: SHIPPED | OUTPATIENT
Start: 2017-11-09 | End: 2018-09-21

## 2017-11-09 RX ORDER — PRAVASTATIN SODIUM 20 MG/1
TABLET ORAL
Qty: 30 TABLET | Refills: 11 | Status: SHIPPED | OUTPATIENT
Start: 2017-11-09 | End: 2018-09-21

## 2017-11-09 RX ORDER — LEVOTHYROXINE SODIUM 25 UG/1
TABLET ORAL
Qty: 30 TABLET | Refills: 11 | Status: SHIPPED | OUTPATIENT
Start: 2017-11-09 | End: 2018-10-25 | Stop reason: SDUPTHER

## 2017-11-20 ENCOUNTER — OFFICE VISIT (OUTPATIENT)
Dept: GASTROENTEROLOGY | Facility: CLINIC | Age: 62
End: 2017-11-20
Payer: MEDICARE

## 2017-11-20 VITALS
BODY MASS INDEX: 36.13 KG/M2 | DIASTOLIC BLOOD PRESSURE: 65 MMHG | HEIGHT: 64 IN | SYSTOLIC BLOOD PRESSURE: 100 MMHG | HEART RATE: 71 BPM | WEIGHT: 211.63 LBS

## 2017-11-20 DIAGNOSIS — K21.00 GASTROESOPHAGEAL REFLUX DISEASE WITH ESOPHAGITIS: ICD-10-CM

## 2017-11-20 DIAGNOSIS — Z87.19 HISTORY OF IBS: ICD-10-CM

## 2017-11-20 DIAGNOSIS — Z79.899 ENCOUNTER FOR MONITORING LONG-TERM PROTON PUMP INHIBITOR THERAPY: Primary | ICD-10-CM

## 2017-11-20 DIAGNOSIS — Z51.81 ENCOUNTER FOR MONITORING LONG-TERM PROTON PUMP INHIBITOR THERAPY: Primary | ICD-10-CM

## 2017-11-20 PROCEDURE — 99499 UNLISTED E&M SERVICE: CPT | Mod: S$GLB,,, | Performed by: NURSE PRACTITIONER

## 2017-11-20 PROCEDURE — 99214 OFFICE O/P EST MOD 30 MIN: CPT | Mod: S$GLB,,, | Performed by: NURSE PRACTITIONER

## 2017-11-20 PROCEDURE — 99999 PR PBB SHADOW E&M-EST. PATIENT-LVL IV: CPT | Mod: PBBFAC,,, | Performed by: NURSE PRACTITIONER

## 2017-11-20 NOTE — PROGRESS NOTES
Subjective:       Patient ID: Vida Ornelas is a 62 y.o. female Body mass index is 36.33 kg/m².    Chief Complaint: Follow-up    Established patient of Dr. Davis & myself.    Abdominal Pain   Chronicity: follow-up. Episode onset: recurred around early 9/2017. The problem has been resolved. The pain is at a severity of 0/10 (currently). Associated symptoms include belching. Pertinent negatives include no anorexia, constipation, diarrhea, dysuria, fever, frequency, hematochezia, melena, nausea, vomiting or weight loss. She has tried proton pump inhibitors (celebrex prn- stopped 2 weeks ago when abdominal pain recurred; restarted omeprazole 40 mg once daily on 9/18/17- significant relief; carafate 1 gram qid; ; bentyl prn PAST: zantac) for the symptoms. The treatment provided significant relief. Prior diagnostic workup includes upper endoscopy and ultrasound. Her past medical history is significant for GERD, irritable bowel syndrome and PUD. There is no history of Crohn's disease, gallstones, pancreatitis or ulcerative colitis.   Gastroesophageal Reflux   She complains of belching and heartburn. She reports no abdominal pain (resolved), no chest pain, no choking, no coughing (resolved), no dysphagia (resolved with EGD with dilation), no globus sensation, no hoarse voice, no nausea, no sore throat or no water brash. This is a chronic problem. Episode onset: several years ago. The problem occurs rarely. The problem has been unchanged (rarely since EGD). The heartburn does not wake her from sleep. The symptoms are aggravated by stress and certain foods (red sauce, spicy foods, peanut, sweets). Pertinent negatives include no fatigue, melena or weight loss. Risk factors include caffeine use, obesity, NSAIDs and smoking/tobacco exposure (nsaids has held off since 9/2017- usually takes celebrex prn for arthritis; arava held currently due to having hip surgery next week; former smoker). She has tried a PPI and a  histamine-2 antagonist (celebrex prn- stopped in 9/2017; omeprazole 40 mg once daily- significant relief; carafate 1 gram qid PAST: dexilant, zantac) for the symptoms. The treatment provided significant relief. Past procedures include an abdominal ultrasound, an EGD and a UGI.     Review of Systems   Constitutional: Positive for appetite change (decreased). Negative for chills, diaphoresis, fatigue, fever, unexpected weight change and weight loss.   HENT: Negative for hoarse voice, mouth sores, sore throat and trouble swallowing (resolved).    Respiratory: Negative for cough (resolved), choking, chest tightness and shortness of breath.    Cardiovascular: Negative for chest pain and palpitations.   Gastrointestinal: Positive for heartburn. Negative for abdominal distention, abdominal pain (resolved), anal bleeding, anorexia, blood in stool (denies bright red blood in stool), constipation, diarrhea, dysphagia (resolved with EGD with dilation), hematochezia, melena, nausea, rectal pain and vomiting.   Genitourinary: Negative for difficulty urinating, dysuria, flank pain, frequency, pelvic pain and urgency.   Musculoskeletal: Negative for back pain.        Sees rheumatology for RA   Neurological: Negative for weakness.       Past Medical History:   Diagnosis Date    Arthritis     Seronegative rheumatoid arthritis    Back pain     s/p ena     Cataract     Colon polyp     ESR raised     Former smoker     GERD with stricture     Hyperlipidemia     Hypertension     Hypothyroidism     Irritable bowel syndrome     Peptic ulcer disease     RA (rheumatoid arthritis)     Vitamin D deficiency      Past Surgical History:   Procedure Laterality Date    COLONOSCOPY  6/15/2015    Dr. lepe: colonic spasm consistent with IBS, otherwise normal findings; repeat in 5 years for surveillance due to family history of colon cancer in father    COLONOSCOPY W/ POLYPECTOMY  2010? 2011?  (Stevie)    Benign polyps removed.     ESOPHAGOGASTRODUODENOSCOPY  early ??    ESOPHAGOSCOPY W/ DILATION  2015  [facility]    Non-erosive esophageal reflux (NERD) disease?.  Dysphagia, esophageal spasm?   Esophagus dilated, 51 Fr.  Antral erythema.  DAYNA Test  Negative.    HYSTERECTOMY      JOINT REPLACEMENT      TOTAL HIP ARTHROPLASTY Right 2015    UPPER GASTROINTESTINAL ENDOSCOPY  2015    UPPER GASTROINTESTINAL ENDOSCOPY      dilation    UPPER GASTROINTESTINAL ENDOSCOPY  04/15/2016    Dr. Davis    UPPER GASTROINTESTINAL ENDOSCOPY  10/03/2017    Dr. Davis     Family History   Problem Relation Age of Onset    Rheum arthritis Mother     Arthritis Mother      rheumatoid arthiritis    Lupus Sister     Hypertension Sister     Arthritis Sister      rheumatoid arthritis    Macular degeneration Sister     Cancer Father      colon CA    Arthritis Father      rheumatoid arthritis    Colon cancer Father      unsure of age of diagnosis,  at 85 years old    Cataracts Father     Breast cancer Neg Hx     Crohn's disease Neg Hx     Ulcerative colitis Neg Hx     Stomach cancer Neg Hx     Esophageal cancer Neg Hx     Amblyopia Neg Hx     Blindness Neg Hx     Diabetes Neg Hx     Glaucoma Neg Hx     Retinal detachment Neg Hx     Strabismus Neg Hx     Stroke Neg Hx     Thyroid disease Neg Hx      Wt Readings from Last 10 Encounters:   17 96 kg (211 lb 10.3 oz)   17 95.7 kg (210 lb 15.7 oz)   10/12/17 94.3 kg (208 lb)   10/11/17 94.8 kg (208 lb 15.9 oz)   10/02/17 93.9 kg (207 lb)   17 94.1 kg (207 lb 7.3 oz)   17 95.1 kg (209 lb 10.5 oz)   17 95.5 kg (210 lb 8.6 oz)   17 98.5 kg (217 lb 3.2 oz)   17 96.3 kg (212 lb 4.9 oz)     Lab Results   Component Value Date    WBC 4.54 2017    HGB 12.2 2017    HCT 38.8 2017    MCV 88 2017     2017     CMP  Sodium   Date Value Ref Range Status   2017 143 136 - 145 mmol/L Final     Potassium   Date Value Ref  Range Status   11/07/2017 4.7 3.5 - 5.1 mmol/L Final     Chloride   Date Value Ref Range Status   11/07/2017 106 95 - 110 mmol/L Final     CO2   Date Value Ref Range Status   11/07/2017 25 23 - 29 mmol/L Final     Glucose   Date Value Ref Range Status   11/07/2017 88 70 - 110 mg/dL Final     BUN, Bld   Date Value Ref Range Status   11/07/2017 17 6 - 20 mg/dL Final     Creatinine   Date Value Ref Range Status   11/07/2017 0.8 0.5 - 1.4 mg/dL Final     Calcium   Date Value Ref Range Status   11/07/2017 9.5 8.7 - 10.5 mg/dL Final     Total Protein   Date Value Ref Range Status   11/07/2017 6.8 6.0 - 8.4 g/dL Final     Albumin   Date Value Ref Range Status   11/07/2017 3.3 (L) 3.5 - 5.2 g/dL Final     Total Bilirubin   Date Value Ref Range Status   11/07/2017 0.6 0.1 - 1.0 mg/dL Final     Comment:     For infants and newborns, interpretation of results should be based  on gestational age, weight and in agreement with clinical  observations.  Premature Infant recommended reference ranges:  Up to 24 hours.............<8.0 mg/dL  Up to 48 hours............<12.0 mg/dL  3-5 days..................<15.0 mg/dL  6-29 days.................<15.0 mg/dL       Alkaline Phosphatase   Date Value Ref Range Status   11/07/2017 93 55 - 135 U/L Final     AST   Date Value Ref Range Status   11/07/2017 17 10 - 40 U/L Final   11/07/2017 17 10 - 40 U/L Final     ALT   Date Value Ref Range Status   11/07/2017 16 10 - 44 U/L Final   11/07/2017 16 10 - 44 U/L Final     Anion Gap   Date Value Ref Range Status   11/07/2017 12 8 - 16 mmol/L Final     eGFR if    Date Value Ref Range Status   11/07/2017 >60.0 >60 mL/min/1.73 m^2 Final     eGFR if non    Date Value Ref Range Status   11/07/2017 >60.0 >60 mL/min/1.73 m^2 Final     Comment:     Calculation used to obtain the estimated glomerular filtration  rate (eGFR) is the CKD-EPI equation.        Lab Results   Component Value Date    LIPASE 44 03/07/2016     Lab  "Results   Component Value Date    AMYLASE 72 03/07/2016     Lab Results   Component Value Date    TSH 2.459 10/12/2017     Lab Results   Component Value Date    OCCULTBLOOD Negative 10/04/2017    OCCULTBLOOD Negative 10/04/2017    OCCULTBLOOD Negative 10/04/2017     Lab Results   Component Value Date    YWDAXLMZ27 >2000 (H) 09/25/2017 9/25/17 magnesium level WNL    Reviewed prior medical records including radiology report of 3/22/16 UGI with SB (severe GERD and esophageal dysmotility), 3/1/16 abdominal ultrasound, & endoscopy history (see surgical history).    6/15/15 Colonoscopy was reviewed and procedure report states:   " Impression: - Mild colonic spasm consistent with irritable bowel   syndrome.  - The examination was otherwise normal.  - The examined portion of the ileum was normal.  - No specimens collected.  Recommendation: - Discharge patient to home.  - High fiber diet.  - Take a PROBIOTIC, such as a carton of GREEK YOGURT   (Chobani or Oikos, or Activia or Dannon); or tablets   of ALIGN or CULTURELLE or NEW-Q (all   non-prescription), every day for a month.  - Repeat colonoscopy in 6 years for screening   purposes.  - Use Bentyl (dicyclomine) 10 mg PO QID 30 min AC.  - Continue present medications.  - Patient has a contact number available for   emergencies. The signs and symptoms of potential   delayed complications were discussed with the   patient. Return to normal activities tomorrow.   Written discharge instructions were provided to the   patient.  - Return to normal activities tomorrow. ".    10/3/17 EGD was reviewed and procedure report states:   " Findings:       The oropharynx was normal.       The proximal esophagus and mid esophagus were normal.       Slight reflux esophagitis was seen in the distal esophagus. Biopsies        were taken with a cold forceps for histology.       The Z-line was regular and was found 36 cm from the incisors.       Some laxness of the lower esophageal " "sphincter was noted, but I did        not see a definite hiatal hernia.       Minimal inflammation characterized by erythema was found in the        prepyloric region of the stomach. Biopsies were taken with a cold        forceps for Helicobacter pylori testing using CLOtest. Biopsies were        taken with a cold forceps for histology.       The stomach was otherwise normal.       The examined duodenum was normal.       No endoscopic abnormality was evident in the esophagus to explain        the patient's complaint of dysphagia. It was decided, however, to        proceed with dilation of the entire esophagus. A guidewire was        placed and the scope was withdrawn. Dilation was performed with a        Savary dilator with no resistance at 54 Fr.  Impression:          - Normal oropharynx.                       - Normal proximal esophagus and mid esophagus.                       - Minimal reflux esophagitis. Biopsied.                       - Z-line regular, 36 cm from the incisors.                       - Minimal antritis. Biopsied.                       - Normal stomach otherwise.                       - Normal examined duodenum.                       - No endoscopic esophageal abnormality to explain                        patient's dysphagia. Esophagus dilated, 54 Fr.  Recommendation:      - Discharge patient to home.                       - Await pathology results.                       - Follow an antireflux regimen.                       - Continue present medications.                       - Use Prilosec (omeprazole) 40 mg PO daily.                       - Use Zantac (ranitidine) 300 mg PO daily.                       - Use sucralfate tablets 1 gram PO QID.                       - Return to GI clinic in 6-8 weeks. ".  Biopsy results:    jaclyn test negative  "Supplemental Diagnosis  2. Stomach, biopsy:  - Negative for Helicobacter pylori by specific immunohistochemical stain. Immunohistochemical stains " "have  satisfactory positive and negative controls.  (Electronically Signed: 2017-10-06 13:57:14 )  Diagnosed by: Eric Contreras M.D.  FINAL PATHOLOGIC DIAGNOSIS  1. Esophagus, distal, biopsy:  Benign esophageal squamous mucosa with changes compatible with reflux. There is no acute inflammation,  increase in intraepithelial eosinophils, or viral cytopathic change.  2. Stomach, biopsy:  Reactive gastropathy. There is no significant inflammation and no intestinal metaplasia or epithelial dysplasia  present. Negative for Helicobacter pylori by H&E stain."  Objective:      Physical Exam   Constitutional: She is oriented to person, place, and time. She appears well-developed and well-nourished. No distress.   HENT:   Head: Atraumatic.   Mouth/Throat: Oropharynx is clear and moist and mucous membranes are normal. No oral lesions. No oropharyngeal exudate.   Eyes: Conjunctivae are normal. Pupils are equal, round, and reactive to light. No scleral icterus.   Neck: Normal range of motion. Neck supple.   Cardiovascular: Normal rate and regular rhythm.    Pulmonary/Chest: Effort normal and breath sounds normal. No respiratory distress. She has no wheezes. She has no rhonchi. She has no rales.   Abdominal: Soft. Normal appearance and bowel sounds are normal. She exhibits no distension, no abdominal bruit, no ascites and no mass. There is no hepatosplenomegaly. There is no tenderness. There is no rigidity, no rebound, no guarding, no tenderness at McBurney's point and negative Woodard's sign. No hernia.   Lymphadenopathy:     She has no cervical adenopathy.   Neurological: She is alert and oriented to person, place, and time.   Skin: Skin is warm and dry. No rash noted. She is not diaphoretic. No erythema. No pallor.   Non-jaundiced   Psychiatric: She has a normal mood and affect. Her behavior is normal.   Nursing note and vitals reviewed.      Assessment:       1. Encounter for monitoring long-term proton pump inhibitor therapy  "   2. Gastroesophageal reflux disease with esophagitis    3. History of IBS        Plan:       Encounter for monitoring long-term proton pump inhibitor therapy  - discussed with patient about long term use of reflux medications and the risk of using these medications long term. Some research studies (not all) have shown decrease absorption of calcium when taking PPI's and H2 blockers, but those same research studies showed that adequate dietary (milk and cheese) and/or supplement of calcium and vitamin d supplement induces enough acid secretion for calcium absorption. Also, discussed about the risk vs benefit of untreated GERD such as martinez's esophagus.  - recommend annual monitoring with blood work to include CMP, CBC, vitamin B12, and magnesium.    Gastroesophageal reflux disease with esophagitis  -  CONTINUE   omeprazole (PRILOSEC) 40 MG capsule; Take 1 capsule (40 mg total) by mouth once daily.  Dispense: 30 capsule; Refill: 3,  take in the morning before breakfast, discussed about possible long term use of medication, pt verbalized understanding and wants to continue current medications at current dosage  -discussed the different types of medications used to treat reflux and how to use them, antacids can be used PRN for breakthrough heartburn symptoms by reducing stomach acid that is already produced, H2 blockers (zantac) work by limiting the amount acid production, & PPI's work to block acid production and are taken daily, patient verbalized understanding.  - continue zantac 300 mg once daily as directed  - continue lifestyle modifications to help control reflux including: avoid large meals, avoid eating within 2-3 hours of bedtime (avoid late night eating & lying down soon after eating), elevate head of bed if nocturnal symptoms are present, & weight loss.   - avoid known foods which trigger reflux symptoms & to minimize/avoid high-fat foods, chocolate, caffeine, citrus, alcohol, & tomato products.  -  avoid/limit use of NSAID's, since they can cause GI upset, bleeding, and/or ulcers. If needed, take with food.  -  continue   sucralfate (CARAFATE) 1 gram tablet; Take 1 tablet (1 g total) by mouth 4 (four) times daily before meals and nightly.      History of IBS  -  continue   dicyclomine (BENTYL) 10 MG capsule; Take 1 capsule (10 mg total) by mouth every 6 (six) hours as needed. Abdominal pain/cramping   - recommended OTC probiotics, such as Align or Culturelle, as directed  - avoid lactose  - drink adequate water intake  -smaller, more frequent meals  -avoid trigger foods    Return in about 3 months (around 2/20/2018), or if symptoms worsen or fail to improve.    If no improvement in symptoms or symptoms worsen, call/follow-up at clinic or go to ER.

## 2017-12-04 ENCOUNTER — TELEPHONE (OUTPATIENT)
Dept: RHEUMATOLOGY | Facility: CLINIC | Age: 62
End: 2017-12-04

## 2017-12-04 NOTE — TELEPHONE ENCOUNTER
Left message returning call that if cleared by surgeon or md, then she can restart her medication. Please call if any further questions.

## 2017-12-04 NOTE — TELEPHONE ENCOUNTER
----- Message from Fabiola Godoy sent at 11/29/2017 12:55 PM CST -----  Contact: patient  Patient calling to speak with the Nurse to find out when can she start back taking the 2 medications she had to stop taking for surgery. Please advise.  Call back   Thanks!

## 2017-12-27 ENCOUNTER — TELEPHONE (OUTPATIENT)
Dept: RHEUMATOLOGY | Facility: CLINIC | Age: 62
End: 2017-12-27

## 2017-12-27 NOTE — TELEPHONE ENCOUNTER
----- Message from Shantel Rai sent at 12/27/2017 12:59 PM CST -----  Patient requesting medication be ordered for sinus issues/uses [facility]/please call patient back at [phone] to advise.

## 2017-12-27 NOTE — TELEPHONE ENCOUNTER
Attempted to contact patient in regards to sinus issues. Left message that Dr Kenyon is out of the office until next week to contact pcp.

## 2018-01-01 RX ORDER — CELECOXIB 200 MG/1
CAPSULE ORAL
Qty: 30 CAPSULE | Refills: 3 | Status: SHIPPED | OUTPATIENT
Start: 2018-01-01 | End: 2018-04-27 | Stop reason: SDUPTHER

## 2018-01-05 ENCOUNTER — TELEPHONE (OUTPATIENT)
Dept: RHEUMATOLOGY | Facility: CLINIC | Age: 63
End: 2018-01-05

## 2018-01-05 RX ORDER — CLARITHROMYCIN 500 MG/1
500 TABLET, FILM COATED ORAL EVERY 12 HOURS
Qty: 20 TABLET | Refills: 0 | Status: SHIPPED | OUTPATIENT
Start: 2018-01-05 | End: 2018-04-19

## 2018-01-05 NOTE — TELEPHONE ENCOUNTER
Left message, last week message left asking pt to contact pcp in regards to sinus issues. Pt never did. Re advised pt to contact either UC or pcp in regards to issue.

## 2018-01-05 NOTE — TELEPHONE ENCOUNTER
----- Message from Marian Dumas sent at 1/5/2018 10:56 AM CST -----  Contact: self  Patient called regarding a Rx for sinuses. Been ongoing since last week. Please contact [phone] (home)     [facility] - [place] - [id number]  [id number]  [place] [address]  Phone: [phone] Fax: [phone]

## 2018-01-29 ENCOUNTER — TELEPHONE (OUTPATIENT)
Dept: FAMILY MEDICINE | Facility: CLINIC | Age: 63
End: 2018-01-29

## 2018-01-29 DIAGNOSIS — J31.0 RHINITIS, UNSPECIFIED CHRONICITY, UNSPECIFIED TYPE: Primary | ICD-10-CM

## 2018-01-29 RX ORDER — AZELASTINE 1 MG/ML
1 SPRAY, METERED NASAL 2 TIMES DAILY
Qty: 30 ML | Refills: 11 | Status: SHIPPED | OUTPATIENT
Start: 2018-01-29 | End: 2019-04-24 | Stop reason: SDUPTHER

## 2018-01-29 NOTE — TELEPHONE ENCOUNTER
Spoke to pt and she has a runny nose and a sinus headache. Pt denies fever or cough. States in the morning when she blows her nose it is clear and she has this headache. Pt would like to know if you could call her something in or suggest something OTC. Please advise. Thanks.

## 2018-01-29 NOTE — TELEPHONE ENCOUNTER
----- Message from Vaibhav Moore sent at 1/29/2018  7:52 AM CST -----  Contact: same  Patient called in and wanted to see if Dr. Leone could call her a Rx in for her sinus issues.  Nose running & sinus headache.        [facility] [id number]  [address]  [facility] [address]  Phone: [phone] Fax: [phone]    Patient call back number is [phone]

## 2018-04-19 ENCOUNTER — OFFICE VISIT (OUTPATIENT)
Dept: INTERNAL MEDICINE | Facility: CLINIC | Age: 63
End: 2018-04-19
Payer: MEDICARE

## 2018-04-19 VITALS
DIASTOLIC BLOOD PRESSURE: 76 MMHG | SYSTOLIC BLOOD PRESSURE: 132 MMHG | TEMPERATURE: 98 F | OXYGEN SATURATION: 96 % | BODY MASS INDEX: 37.38 KG/M2 | HEIGHT: 64 IN | WEIGHT: 218.94 LBS | HEART RATE: 64 BPM | RESPIRATION RATE: 18 BRPM

## 2018-04-19 DIAGNOSIS — M47.816 OSTEOARTHRITIS OF LUMBAR SPINE, UNSPECIFIED SPINAL OSTEOARTHRITIS COMPLICATION STATUS: ICD-10-CM

## 2018-04-19 DIAGNOSIS — E78.5 HYPERLIPIDEMIA, UNSPECIFIED HYPERLIPIDEMIA TYPE: ICD-10-CM

## 2018-04-19 DIAGNOSIS — I10 ESSENTIAL HYPERTENSION: ICD-10-CM

## 2018-04-19 DIAGNOSIS — M06.9 RHEUMATOID ARTHRITIS INVOLVING MULTIPLE SITES, UNSPECIFIED RHEUMATOID FACTOR PRESENCE: ICD-10-CM

## 2018-04-19 DIAGNOSIS — E03.9 ACQUIRED HYPOTHYROIDISM: ICD-10-CM

## 2018-04-19 DIAGNOSIS — J30.1 CHRONIC ALLERGIC RHINITIS DUE TO POLLEN, UNSPECIFIED SEASONALITY: ICD-10-CM

## 2018-04-19 PROCEDURE — 99214 OFFICE O/P EST MOD 30 MIN: CPT | Mod: S$GLB,,, | Performed by: INTERNAL MEDICINE

## 2018-04-19 PROCEDURE — 3075F SYST BP GE 130 - 139MM HG: CPT | Mod: CPTII,S$GLB,, | Performed by: INTERNAL MEDICINE

## 2018-04-19 PROCEDURE — 99999 PR PBB SHADOW E&M-EST. PATIENT-LVL III: CPT | Mod: PBBFAC,,, | Performed by: INTERNAL MEDICINE

## 2018-04-19 PROCEDURE — 3078F DIAST BP <80 MM HG: CPT | Mod: CPTII,S$GLB,, | Performed by: INTERNAL MEDICINE

## 2018-04-19 PROCEDURE — 99499 UNLISTED E&M SERVICE: CPT | Mod: S$PBB,,, | Performed by: INTERNAL MEDICINE

## 2018-04-19 RX ORDER — FLUTICASONE PROPIONATE 50 MCG
2 SPRAY, SUSPENSION (ML) NASAL DAILY
Qty: 1 BOTTLE | Refills: 5 | Status: SHIPPED | OUTPATIENT
Start: 2018-04-19 | End: 2018-05-19

## 2018-04-19 NOTE — PROGRESS NOTES
HISTORY OF PRESENT ILLNESS:  Pt. is a 62 y.o. female presents for monitoring of her RA, hypothyroidism, HTN, hyperlipidemia.  She is currently UTD with her labs.  She has upcoming appt. With Dr. Kenyon of rheumatology.  HTN is in good control on current med.  TSH is WNL on current med.  She is on pravastatin.  She is still going to PT, she is seeing Dr. Israel Steele, is having pending nerve ablation in her back.    Health Maintenance Topics with due status: Not Due       Topic Last Completion Date    Colonoscopy 06/15/2015    Mammogram 10/12/2017    Lipid Panel 11/07/2017     Health Maintenance Due   Topic Date Due    TETANUS VACCINE  08/09/2016       Lab Results   Component Value Date    WBC 4.54 11/07/2017    HGB 12.2 11/07/2017    HCT 38.8 11/07/2017     11/07/2017    CHOL 219 (H) 11/07/2017    TRIG 54 11/07/2017    HDL 67 11/07/2017    LDLCALC 141.2 11/07/2017    ALT 16 11/07/2017    ALT 16 11/07/2017    AST 17 11/07/2017    AST 17 11/07/2017     11/07/2017    K 4.7 11/07/2017     11/07/2017    CREATININE 0.8 11/07/2017    BUN 17 11/07/2017    CO2 25 11/07/2017    ALBUMIN 3.3 (L) 11/07/2017    TSH 2.459 10/12/2017    HGBA1C 5.5 05/19/2010       Past Medical History:   Diagnosis Date    Arthritis     Seronegative rheumatoid arthritis    Back pain     s/p ena     Cataract     Colon polyp     ESR raised     Former smoker     GERD with stricture     Hyperlipidemia     Hypertension     Hypothyroidism     Irritable bowel syndrome     Peptic ulcer disease     RA (rheumatoid arthritis)     Vitamin D deficiency        Past Surgical History:   Procedure Laterality Date    COLONOSCOPY  6/15/2015    Dr. lepe: colonic spasm consistent with IBS, otherwise normal findings; repeat in 5 years for surveillance due to family history of colon cancer in father    COLONOSCOPY W/ POLYPECTOMY  2010? 2011?  ([facility])    Benign polyps removed.    ESOPHAGOGASTRODUODENOSCOPY  early 2000s??     ESOPHAGOSCOPY W/ DILATION  5/13/2015  [facility]    Non-erosive esophageal reflux (NERD) disease?.  Dysphagia, esophageal spasm?   Esophagus dilated, 51 Fr.  Antral erythema.  DAYNA Test  Negative.    HYSTERECTOMY      JOINT REPLACEMENT      TOTAL HIP ARTHROPLASTY Right 2/2/2015    UPPER GASTROINTESTINAL ENDOSCOPY  5/2015    UPPER GASTROINTESTINAL ENDOSCOPY      dilation    UPPER GASTROINTESTINAL ENDOSCOPY  04/15/2016    Dr. Davis    UPPER GASTROINTESTINAL ENDOSCOPY  10/03/2017    Dr. Davis       Social History     Social History    Marital status:      Spouse name: N/A    Number of children: 3    Years of education: N/A     Social History Main Topics    Smoking status: Former Smoker     Quit date: 6/12/1975    Smokeless tobacco: Never Used    Alcohol use No    Drug use: No    Sexual activity: Yes     Other Topics Concern    None     Social History Narrative    None       ROS:  GENERAL: No fever, chills, fatigability or weight loss.  SKIN: No rashes, itching or changes in color or texture of skin.  HEAD: No headaches or recent head trauma.  EARS: Denies ear pain, discharge or vertigo.  NOSE: No loss of smell, no epistaxis; positive postnasal drip/states astelin not helping at this time;  MOUTH & THROAT: No hoarseness or change in voice. No excessive gum bleeding.  NODES: Denies swollen glands.  CHEST: Denies COVARRUBIAS, cyanosis, wheezing, occ dry cough and clear sputum production.  CARDIOVASCULAR: Denies chest pain, PND, orthopnea or reduced exercise tolerance.  ABDOMEN: Appetite fine. No weight loss. Denies constipation, diarrhea, abdominal pain, hematemesis or blood in stool.  URINARY: No flank pain, dysuria or hematuria.  PERIPHERAL VASCULAR: No claudication or cyanosis. No edema.  MUSCULOSKELETAL: Positive joint stiffness to right knee; no swelling. Positive back pain.  NEUROLOGIC: Denies numbness; improved tingling to hands;    PE:   Vitals:   Vitals:    04/19/18 1717   BP: 132/76   Pulse: 64   Resp:  18   Temp: 98.2 °F (36.8 °C)     GENERAL: no acute distress, A&Ox3, comfortable.  Female with BMI of 37   HEENT: tympanic membranes clear, nasal mucosa pink, no pharyngeal erythema or exudate  NECK: supple, no cervical lymphadenopathy, no thyromegaly; no supraclavicular nodes;   CHEST:  clear to auscultation bilaterally, no crackles or wheeze; no increased work of breathing;  CARDIOVASCULAR: regular rate and rhythm, no rubs, murmurs or gallops.  ABDOMEN: normal bowel sounds, soft non-tender, non-distended; no palpable organomegaly;   EXT: no clubbing, cyanosis or edema.     ASSESSMENT/PLAN:      RA: has appt. With Dr. Chantale pierre;    HTN: in control on current meds; will continue;    Hypothyroidism: in control on current med; will continue;    Hyperlipidemia: continue pravastatin; much improved;    Lumbar DJD: is seeing Dr. Israel Steele, is having pending nerve ablation in her back; attending   Physical therapy;      Chronic allergic rhinitis due to pollen, unspecified seasonality  -     fluticasone (FLONASE) 50 mcg/actuation nasal spray; 2 sprays (100 mcg total) by Each Nare route once daily.  Dispense: 1 Bottle; Refill: 5        Medication List with Changes/Refills   New Medications    FLUTICASONE (FLONASE) 50 MCG/ACTUATION NASAL SPRAY    2 sprays (100 mcg total) by Each Nare route once daily.   Current Medications    AZELASTINE (ASTELIN) 137 MCG (0.1 %) NASAL SPRAY    1 spray (137 mcg total) by Nasal route 2 (two) times daily.    CELECOXIB (CELEBREX) 200 MG CAPSULE    Take 200 mg by mouth once daily.     CELECOXIB (CELEBREX) 200 MG CAPSULE    take 1 Capsule by mouth once daily    CYANOCOBALAMIN 1,000 MCG/ML INJECTION    INJECT 1ML INTO THE SKIN EVERY 7 DAYS    DICYCLOMINE (BENTYL) 10 MG CAPSULE    Take 10 mg by mouth every 6 (six) hours as needed.    FISH OIL-OMEGA-3 FATTY ACIDS 300-1,000 MG CAPSULE    Take 2 g by mouth once daily.    HYDROCODONE-ACETAMINOPHEN 10-325MG (NORCO)  MG TAB    Take 1 tablet  by mouth.    LEFLUNOMIDE (ARAVA) 20 MG TAB    Take 1 tablet (20 mg total) by mouth once daily.    LEVOTHYROXINE (SYNTHROID) 25 MCG TABLET    TAKE ONE TABLET BY MOUTH EVERY MORNING before breakfast and hold all food and meds for 30 minutes after taking    LIDOCAINE HCL 2% (XYLOCAINE) 2 % JELLY    Apply ONE a small amount to affected area three times a day as needed    LOSARTAN (COZAAR) 25 MG TABLET    TAKE ONE TABLET BY MOUTH ONCE DAILY    MULTIVITAMIN (ONE DAILY MULTIVITAMIN) PER TABLET    Take 1 tablet by mouth once daily.    OMEPRAZOLE (PRILOSEC) 40 MG CAPSULE    Take 1 capsule (40 mg total) by mouth once daily.    PRAVASTATIN (PRAVACHOL) 20 MG TABLET    TAKE ONE TABLET BY MOUTH ONCE DAILY    SUCRALFATE (CARAFATE) 1 GRAM TABLET    Take 1 tablet (1 g total) by mouth 4 (four) times daily before meals and nightly.    VOLTAREN 1 % GEL       Discontinued Medications    CLARITHROMYCIN (BIAXIN) 500 MG TABLET    Take 1 tablet (500 mg total) by mouth every 12 (twelve) hours.     Call if condition changes or worsens.

## 2018-04-20 PROBLEM — M47.816 OSTEOARTHRITIS OF LUMBAR SPINE: Status: ACTIVE | Noted: 2018-04-20

## 2018-04-20 PROBLEM — I10 ESSENTIAL HYPERTENSION: Status: ACTIVE | Noted: 2018-04-20

## 2018-04-20 PROBLEM — E78.5 HYPERLIPIDEMIA: Status: ACTIVE | Noted: 2018-04-20

## 2018-04-27 ENCOUNTER — OFFICE VISIT (OUTPATIENT)
Dept: RHEUMATOLOGY | Facility: CLINIC | Age: 63
End: 2018-04-27
Payer: MEDICARE

## 2018-04-27 ENCOUNTER — LAB VISIT (OUTPATIENT)
Dept: LAB | Facility: HOSPITAL | Age: 63
End: 2018-04-27
Attending: INTERNAL MEDICINE
Payer: MEDICARE

## 2018-04-27 VITALS
BODY MASS INDEX: 37.63 KG/M2 | SYSTOLIC BLOOD PRESSURE: 161 MMHG | WEIGHT: 219.25 LBS | DIASTOLIC BLOOD PRESSURE: 83 MMHG

## 2018-04-27 DIAGNOSIS — M16.0 PRIMARY OSTEOARTHRITIS OF BOTH HIPS: ICD-10-CM

## 2018-04-27 DIAGNOSIS — M06.9 RHEUMATOID ARTHRITIS OF HAND, UNSPECIFIED LATERALITY, UNSPECIFIED RHEUMATOID FACTOR PRESENCE: ICD-10-CM

## 2018-04-27 DIAGNOSIS — M16.9 OSTEOARTHROSIS OF HIP: ICD-10-CM

## 2018-04-27 DIAGNOSIS — E03.9 ACQUIRED HYPOTHYROIDISM: ICD-10-CM

## 2018-04-27 DIAGNOSIS — M25.552 HIP PAIN, LEFT: ICD-10-CM

## 2018-04-27 DIAGNOSIS — R70.0 ESR RAISED: ICD-10-CM

## 2018-04-27 DIAGNOSIS — M94.9 DISORDER OF CARTILAGE: ICD-10-CM

## 2018-04-27 LAB
ALBUMIN SERPL BCP-MCNC: 3.6 G/DL
ALP SERPL-CCNC: 108 U/L
ALT SERPL W/O P-5'-P-CCNC: 32 U/L
ANION GAP SERPL CALC-SCNC: 9 MMOL/L
AST SERPL-CCNC: 25 U/L
BASOPHILS # BLD AUTO: 0.04 K/UL
BASOPHILS NFR BLD: 0.8 %
BILIRUB SERPL-MCNC: 0.4 MG/DL
BUN SERPL-MCNC: 16 MG/DL
CALCIUM SERPL-MCNC: 9.8 MG/DL
CCP AB SER IA-ACNC: <0.5 U/ML
CHLORIDE SERPL-SCNC: 106 MMOL/L
CK SERPL-CCNC: 106 U/L
CO2 SERPL-SCNC: 25 MMOL/L
CREAT SERPL-MCNC: 0.7 MG/DL
CRP SERPL-MCNC: 6.9 MG/L
DIFFERENTIAL METHOD: ABNORMAL
EOSINOPHIL # BLD AUTO: 0.2 K/UL
EOSINOPHIL NFR BLD: 3.5 %
ERYTHROCYTE [DISTWIDTH] IN BLOOD BY AUTOMATED COUNT: 15.4 %
ERYTHROCYTE [SEDIMENTATION RATE] IN BLOOD BY WESTERGREN METHOD: 25 MM/HR
EST. GFR  (AFRICAN AMERICAN): >60 ML/MIN/1.73 M^2
EST. GFR  (NON AFRICAN AMERICAN): >60 ML/MIN/1.73 M^2
GLUCOSE SERPL-MCNC: 86 MG/DL
HCT VFR BLD AUTO: 38 %
HGB BLD-MCNC: 11.4 G/DL
IMM GRANULOCYTES # BLD AUTO: 0.01 K/UL
IMM GRANULOCYTES NFR BLD AUTO: 0.2 %
LYMPHOCYTES # BLD AUTO: 1.8 K/UL
LYMPHOCYTES NFR BLD: 34.7 %
MCH RBC QN AUTO: 26.1 PG
MCHC RBC AUTO-ENTMCNC: 30 G/DL
MCV RBC AUTO: 87 FL
MONOCYTES # BLD AUTO: 0.5 K/UL
MONOCYTES NFR BLD: 10.4 %
NEUTROPHILS # BLD AUTO: 2.6 K/UL
NEUTROPHILS NFR BLD: 50.4 %
NRBC BLD-RTO: 0 /100 WBC
PLATELET # BLD AUTO: 217 K/UL
PMV BLD AUTO: 11.7 FL
POTASSIUM SERPL-SCNC: 3.9 MMOL/L
PROT SERPL-MCNC: 7.2 G/DL
RBC # BLD AUTO: 4.37 M/UL
RHEUMATOID FACT SERPL-ACNC: <10 IU/ML
SODIUM SERPL-SCNC: 140 MMOL/L
T3FREE SERPL-MCNC: 2.7 PG/ML
T4 FREE SERPL-MCNC: 1.23 NG/DL
TSH SERPL DL<=0.005 MIU/L-ACNC: 3 UIU/ML
WBC # BLD AUTO: 5.1 K/UL

## 2018-04-27 PROCEDURE — 96372 THER/PROPH/DIAG INJ SC/IM: CPT | Mod: S$GLB,,, | Performed by: INTERNAL MEDICINE

## 2018-04-27 PROCEDURE — 84439 ASSAY OF FREE THYROXINE: CPT

## 2018-04-27 PROCEDURE — 84443 ASSAY THYROID STIM HORMONE: CPT

## 2018-04-27 PROCEDURE — 85651 RBC SED RATE NONAUTOMATED: CPT | Mod: PO

## 2018-04-27 PROCEDURE — 86140 C-REACTIVE PROTEIN: CPT

## 2018-04-27 PROCEDURE — 82550 ASSAY OF CK (CPK): CPT

## 2018-04-27 PROCEDURE — 84481 FREE ASSAY (FT-3): CPT

## 2018-04-27 PROCEDURE — 80053 COMPREHEN METABOLIC PANEL: CPT

## 2018-04-27 PROCEDURE — 36415 COLL VENOUS BLD VENIPUNCTURE: CPT | Mod: PO

## 2018-04-27 PROCEDURE — 86200 CCP ANTIBODY: CPT

## 2018-04-27 PROCEDURE — 86038 ANTINUCLEAR ANTIBODIES: CPT

## 2018-04-27 PROCEDURE — 99214 OFFICE O/P EST MOD 30 MIN: CPT | Mod: 25,S$GLB,, | Performed by: INTERNAL MEDICINE

## 2018-04-27 PROCEDURE — 85025 COMPLETE CBC W/AUTO DIFF WBC: CPT

## 2018-04-27 PROCEDURE — 99499 UNLISTED E&M SERVICE: CPT | Mod: S$PBB,,, | Performed by: INTERNAL MEDICINE

## 2018-04-27 PROCEDURE — 82085 ASSAY OF ALDOLASE: CPT

## 2018-04-27 PROCEDURE — 99999 PR PBB SHADOW E&M-EST. PATIENT-LVL IV: CPT | Mod: PBBFAC,,, | Performed by: INTERNAL MEDICINE

## 2018-04-27 PROCEDURE — 86431 RHEUMATOID FACTOR QUANT: CPT

## 2018-04-27 PROCEDURE — 3079F DIAST BP 80-89 MM HG: CPT | Mod: CPTII,S$GLB,, | Performed by: INTERNAL MEDICINE

## 2018-04-27 PROCEDURE — 3008F BODY MASS INDEX DOCD: CPT | Mod: CPTII,S$GLB,, | Performed by: INTERNAL MEDICINE

## 2018-04-27 PROCEDURE — 3077F SYST BP >= 140 MM HG: CPT | Mod: CPTII,S$GLB,, | Performed by: INTERNAL MEDICINE

## 2018-04-27 RX ORDER — DICLOFENAC SODIUM 10 MG/G
GEL TOPICAL 4 TIMES DAILY
Qty: 400 G | Refills: 4 | Status: SHIPPED | OUTPATIENT
Start: 2018-04-27

## 2018-04-27 RX ORDER — CELECOXIB 200 MG/1
200 CAPSULE ORAL 2 TIMES DAILY
Qty: 60 CAPSULE | Refills: 5 | Status: SHIPPED | OUTPATIENT
Start: 2018-04-27 | End: 2018-11-21 | Stop reason: SDUPTHER

## 2018-04-27 RX ORDER — METHYLPREDNISOLONE 4 MG/1
TABLET ORAL
Qty: 1 PACKAGE | Refills: 3 | Status: SHIPPED | OUTPATIENT
Start: 2018-04-27 | End: 2018-05-18

## 2018-04-27 RX ORDER — LEFLUNOMIDE 20 MG/1
20 TABLET ORAL DAILY
Qty: 30 TABLET | Refills: 6 | Status: SHIPPED | OUTPATIENT
Start: 2018-04-27 | End: 2018-09-21 | Stop reason: SDUPTHER

## 2018-04-27 RX ORDER — DEXAMETHASONE SODIUM PHOSPHATE 4 MG/ML
8 INJECTION, SOLUTION INTRA-ARTICULAR; INTRALESIONAL; INTRAMUSCULAR; INTRAVENOUS; SOFT TISSUE
Status: COMPLETED | OUTPATIENT
Start: 2018-04-27 | End: 2018-04-27

## 2018-04-27 RX ADMIN — DEXAMETHASONE SODIUM PHOSPHATE 8 MG: 4 INJECTION, SOLUTION INTRA-ARTICULAR; INTRALESIONAL; INTRAMUSCULAR; INTRAVENOUS; SOFT TISSUE at 12:04

## 2018-04-27 ASSESSMENT — ROUTINE ASSESSMENT OF PATIENT INDEX DATA (RAPID3)
TOTAL RAPID3 SCORE: 6
MDHAQ FUNCTION SCORE: 1.2
PATIENT GLOBAL ASSESSMENT SCORE: 5
PSYCHOLOGICAL DISTRESS SCORE: 3.3
PAIN SCORE: 9

## 2018-04-27 NOTE — PATIENT INSTRUCTIONS
Decadron shot today if no better by tomorrow start medrol back   increased celebrex twice a day while you are hurting  contine arava  Add muscle relaxer at night with pain meds

## 2018-04-27 NOTE — PROGRESS NOTES
Subjective:       Patient ID: Vida Ornelas is a 62 y.o. female.    Chief Complaint: Follow-up    RA: on arava, and medrol dose pack.she had severe, lower back pain, severe nerve pain, and knee and  Thigh soreness and knee pain and ankle pain. Patient complains of arthralgias and myalgias for which has been present for a few years. Pain is located in multiple joints, both shoulder(s), both elbow(s), both wrist(s), both MCP(s): 1st, 2nd, 3rd, 4th and 5th, both PIP(s): 1st, 2nd, 3rd, 4th and 5th, both DIP(s): 1st and 2nd, both hip(s), both knee(s) and both MTP(s): 1st, 2nd, 3rd, 4th and 5th, is described as aching, pulsating, shooting and throbbing, and is constant, moderate .  Associated symptoms include: crepitation, decreased range of motion, edema, effusion, tenderness and warmth.                 She complains of joint swelling. Associated symptoms include fatigue and myalgias.     Her past medical history is significant for osteoarthritis.           She complains of joint swelling. The symptoms have been improving. Affected locations include the neck, right MCP, right PIP, right DIP, left PIP, left DIP, left MCP and left wrist. Associated symptoms include fatigue and myalgias.     Her past medical history is significant for osteoarthritis. Factors aggravating her arthritis include activity.           She complains of joint swelling. Associated symptoms include fatigue and myalgias.     Her past medical history is significant for osteoarthritis.   Hip Pain   Associated symptoms include arthralgias, fatigue, joint swelling, myalgias and neck pain. Pertinent negatives include no abdominal pain, anorexia, change in bowel habit, chest pain, chills, congestion, coughing, diaphoresis, nausea, numbness, rash, sore throat, vomiting or weakness.   Osteoarthritis   Associated symptoms include arthralgias, fatigue, joint swelling, myalgias and neck pain. Pertinent negatives include no abdominal pain, anorexia, change in  bowel habit, chest pain, chills, congestion, coughing, diaphoresis, nausea, numbness, rash, sore throat, vomiting or weakness.     Review of Systems   Constitutional: Positive for fatigue. Negative for activity change, appetite change, chills, diaphoresis and unexpected weight change.   HENT: Negative for congestion, dental problem, ear discharge, ear pain, facial swelling, mouth sores, nosebleeds, postnasal drip, rhinorrhea, sinus pressure, sneezing, sore throat, tinnitus and voice change.    Eyes: Negative for photophobia, pain, discharge, redness and itching.   Respiratory: Negative for apnea, cough, chest tightness, shortness of breath and wheezing.    Cardiovascular: Negative for chest pain, palpitations and leg swelling.   Gastrointestinal: Negative for abdominal distention, abdominal pain, anorexia, change in bowel habit, constipation, diarrhea, nausea and vomiting.   Endocrine: Negative for cold intolerance, heat intolerance, polydipsia and polyuria.   Genitourinary: Negative for decreased urine volume, difficulty urinating, flank pain, frequency, hematuria and urgency.   Musculoskeletal: Positive for arthralgias, back pain, gait problem, joint swelling, myalgias, neck pain and neck stiffness.   Skin: Negative for pallor, rash and wound.   Allergic/Immunologic: Negative for immunocompromised state.   Neurological: Negative for dizziness, tremors, weakness and numbness.   Hematological: Negative for adenopathy. Does not bruise/bleed easily.   Psychiatric/Behavioral: Negative for sleep disturbance. The patient is not nervous/anxious.          Objective:     BP (!) 161/83 (BP Location: Left arm, Patient Position: Sitting, BP Method: Medium (Automatic))   Wt 99.5 kg (219 lb 4 oz)   BMI 37.63 kg/m²      Physical Exam   Nursing note and vitals reviewed.  Constitutional: She is oriented to person, place, and time. She appears distressed.   HENT:   Head: Normocephalic and atraumatic.   Mouth/Throat: Oropharynx is  clear and moist.   Eyes: EOM are normal. Pupils are equal, round, and reactive to light.   Neck: Neck supple. No thyromegaly present.   Cardiovascular: Normal rate, regular rhythm and normal heart sounds.  Exam reveals no gallop and no friction rub.    No murmur heard.  Pulmonary/Chest: Breath sounds normal. She has no wheezes. She has no rales. She exhibits no tenderness.   Abdominal: There is no tenderness. There is no rebound and no guarding.       Right Side Rheumatological Exam     The patient is tender to palpation of the shoulder, wrist, knee, 1st PIP, 1st MCP, 2nd PIP, 2nd MCP, 3rd PIP, 3rd MCP, 4th PIP, 4th MCP, 5th PIP and 5th MCP    She has swelling of the shoulder, elbow, wrist and knee    Shoulder Exam   Tenderness Location: no tenderness    Range of Motion   Active Abduction: abnormal   Adduction: abnormal  Sensation: normal    Knee Exam   Patellofemoral Crepitus: positive  Effusion: positive  Sensation: normal    Hip Exam   Tenderness Location: posterior and anterior    Range of Motion   Internal Rotation: abnormal   External Rotation: abnormal   Sensation: normal    Elbow/Wrist Exam   Tenderness Location: no tenderness  Sensation: normal    Left Side Rheumatological Exam     Examination finds the elbow normal.    The patient is tender to palpation of the wrist, knee, 1st PIP, 1st MCP, 2nd PIP, 2nd MCP, 3rd PIP, 3rd MCP, 4th PIP, 4th MCP, 5th PIP and 5th MCP.    She has swelling of the shoulder, wrist and knee    Shoulder Exam   Tenderness Location: no tenderness    Range of Motion   Active Abduction: abnormal   Sensation: normal    Knee Exam     Patellofemoral Crepitus: negative  Effusion: negative  Sensation: normal    Hip Exam   Tenderness Location: no tenderness  Sensation: normal    Elbow/Wrist Exam   Sensation: normal      Back/Neck Exam   Tenderness Right paramedian tenderness of the Lower C-Spine and Upper C-Spine.Left paramedian tenderness of the Lower C-Spine and Upper  C-Spine.      Lymphadenopathy:     She has no cervical adenopathy.   Neurological: She is alert and oriented to person, place, and time. Gait normal.   Skin: No rash noted. No erythema. No pallor.     Psychiatric: Mood and affect normal.   Musculoskeletal: She exhibits edema, tenderness and deformity.          Results for orders placed or performed in visit on 11/07/17   AST (SGOT)   Result Value Ref Range    AST 17 10 - 40 U/L   ALT (SGPT)   Result Value Ref Range    ALT 16 10 - 44 U/L   Lipid panel   Result Value Ref Range    Cholesterol 219 (H) 120 - 199 mg/dL    Triglycerides 54 30 - 150 mg/dL    HDL 67 40 - 75 mg/dL    LDL Cholesterol 141.2 63.0 - 159.0 mg/dL    HDL/Chol Ratio 30.6 20.0 - 50.0 %    Total Cholesterol/HDL Ratio 3.3 2.0 - 5.0    Non-HDL Cholesterol 152 mg/dL   CBC auto differential   Result Value Ref Range    WBC 4.54 3.90 - 12.70 K/uL    RBC 4.40 4.00 - 5.40 M/uL    Hemoglobin 12.2 12.0 - 16.0 g/dL    Hematocrit 38.8 37.0 - 48.5 %    MCV 88 82 - 98 fL    MCH 27.7 27.0 - 31.0 pg    MCHC 31.4 (L) 32.0 - 36.0 g/dL    RDW 13.3 11.5 - 14.5 %    Platelets 178 150 - 350 K/uL    MPV 12.8 9.2 - 12.9 fL    Immature Granulocytes 0.4 0.0 - 0.5 %    Gran # (ANC) 2.2 1.8 - 7.7 K/uL    Immature Grans (Abs) 0.02 0.00 - 0.04 K/uL    Lymph # 1.8 1.0 - 4.8 K/uL    Mono # 0.4 0.3 - 1.0 K/uL    Eos # 0.0 0.0 - 0.5 K/uL    Baso # 0.03 0.00 - 0.20 K/uL    nRBC 0 0 /100 WBC    Gran% 49.3 38.0 - 73.0 %    Lymph% 39.9 18.0 - 48.0 %    Mono% 8.8 4.0 - 15.0 %    Eosinophil% 0.9 0.0 - 8.0 %    Basophil% 0.7 0.0 - 1.9 %    Differential Method Automated    Comprehensive metabolic panel   Result Value Ref Range    Sodium 143 136 - 145 mmol/L    Potassium 4.7 3.5 - 5.1 mmol/L    Chloride 106 95 - 110 mmol/L    CO2 25 23 - 29 mmol/L    Glucose 88 70 - 110 mg/dL    BUN, Bld 17 6 - 20 mg/dL    Creatinine 0.8 0.5 - 1.4 mg/dL    Calcium 9.5 8.7 - 10.5 mg/dL    Total Protein 6.8 6.0 - 8.4 g/dL    Albumin 3.3 (L) 3.5 - 5.2 g/dL     Total Bilirubin 0.6 0.1 - 1.0 mg/dL    Alkaline Phosphatase 93 55 - 135 U/L    AST 17 10 - 40 U/L    ALT 16 10 - 44 U/L    Anion Gap 12 8 - 16 mmol/L    eGFR if African American >60.0 >60 mL/min/1.73 m^2    eGFR if non African American >60.0 >60 mL/min/1.73 m^2   C-reactive protein   Result Value Ref Range    CRP 0.8 0.0 - 8.2 mg/L   Sedimentation rate, manual   Result Value Ref Range    Sed Rate 30 (H) 0 - 20 mm/Hr         Assessment:       Encounter Diagnoses   Name Primary?    Rheumatoid arthritis of hand, unspecified laterality, unspecified rheumatoid factor presence     Hip pain, left     Osteoarthrosis of hip     Acquired hypothyroidism     ESR raised     Primary osteoarthritis of both hips          Plan:     Rheumatoid arthritis of hand, unspecified laterality, unspecified rheumatoid factor presence  -     celecoxib (CELEBREX) 200 MG capsule; Take 1 capsule (200 mg total) by mouth 2 (two) times daily.  Dispense: 60 capsule; Refill: 5  -     leflunomide (ARAVA) 20 MG Tab; Take 1 tablet (20 mg total) by mouth once daily.  Dispense: 30 tablet; Refill: 6  -     VOLTAREN 1 % Gel; Apply topically 4 (four) times daily.  Dispense: 400 g; Refill: 4  -     TAD; Future; Expected date: 04/27/2018  -     CK; Future; Expected date: 04/27/2018  -     Aldolase; Future; Expected date: 04/27/2018  -     CBC auto differential; Future; Expected date: 04/27/2018  -     Comprehensive metabolic panel; Future; Expected date: 04/27/2018  -     C-reactive protein; Future; Expected date: 04/27/2018  -     Sedimentation rate, manual; Future; Expected date: 04/27/2018  -     Rheumatoid factor; Future; Expected date: 04/27/2018  -     Cyclic citrul peptide antibody, IgG; Future; Expected date: 04/27/2018  -     TSH; Future; Expected date: 04/27/2018  -     T4, free; Future; Expected date: 04/27/2018  -     T3, free; Future; Expected date: 04/27/2018  -     dexamethasone injection 8 mg; Inject 2 mLs (8 mg total) into the muscle one  time.  -     DXA Bone Density Spine And Hip; Future; Expected date: 04/27/2018  -     methylPREDNISolone (MEDROL DOSEPACK) 4 mg tablet; use as directed  Dispense: 1 Package; Refill: 3    Hip pain, left  -     celecoxib (CELEBREX) 200 MG capsule; Take 1 capsule (200 mg total) by mouth 2 (two) times daily.  Dispense: 60 capsule; Refill: 5  -     leflunomide (ARAVA) 20 MG Tab; Take 1 tablet (20 mg total) by mouth once daily.  Dispense: 30 tablet; Refill: 6  -     VOLTAREN 1 % Gel; Apply topically 4 (four) times daily.  Dispense: 400 g; Refill: 4  -     TAD; Future; Expected date: 04/27/2018  -     CK; Future; Expected date: 04/27/2018  -     Aldolase; Future; Expected date: 04/27/2018  -     CBC auto differential; Future; Expected date: 04/27/2018  -     Comprehensive metabolic panel; Future; Expected date: 04/27/2018  -     C-reactive protein; Future; Expected date: 04/27/2018  -     Sedimentation rate, manual; Future; Expected date: 04/27/2018  -     Rheumatoid factor; Future; Expected date: 04/27/2018  -     Cyclic citrul peptide antibody, IgG; Future; Expected date: 04/27/2018  -     TSH; Future; Expected date: 04/27/2018  -     T4, free; Future; Expected date: 04/27/2018  -     T3, free; Future; Expected date: 04/27/2018  -     dexamethasone injection 8 mg; Inject 2 mLs (8 mg total) into the muscle one time.  -     DXA Bone Density Spine And Hip; Future; Expected date: 04/27/2018  -     methylPREDNISolone (MEDROL DOSEPACK) 4 mg tablet; use as directed  Dispense: 1 Package; Refill: 3    Osteoarthrosis of hip  -     celecoxib (CELEBREX) 200 MG capsule; Take 1 capsule (200 mg total) by mouth 2 (two) times daily.  Dispense: 60 capsule; Refill: 5  -     leflunomide (ARAVA) 20 MG Tab; Take 1 tablet (20 mg total) by mouth once daily.  Dispense: 30 tablet; Refill: 6  -     VOLTAREN 1 % Gel; Apply topically 4 (four) times daily.  Dispense: 400 g; Refill: 4  -     TAD; Future; Expected date: 04/27/2018  -     CK; Future;  Expected date: 04/27/2018  -     Aldolase; Future; Expected date: 04/27/2018  -     CBC auto differential; Future; Expected date: 04/27/2018  -     Comprehensive metabolic panel; Future; Expected date: 04/27/2018  -     C-reactive protein; Future; Expected date: 04/27/2018  -     Sedimentation rate, manual; Future; Expected date: 04/27/2018  -     Rheumatoid factor; Future; Expected date: 04/27/2018  -     Cyclic citrul peptide antibody, IgG; Future; Expected date: 04/27/2018  -     TSH; Future; Expected date: 04/27/2018  -     T4, free; Future; Expected date: 04/27/2018  -     T3, free; Future; Expected date: 04/27/2018  -     dexamethasone injection 8 mg; Inject 2 mLs (8 mg total) into the muscle one time.  -     DXA Bone Density Spine And Hip; Future; Expected date: 04/27/2018  -     methylPREDNISolone (MEDROL DOSEPACK) 4 mg tablet; use as directed  Dispense: 1 Package; Refill: 3    Acquired hypothyroidism  -     celecoxib (CELEBREX) 200 MG capsule; Take 1 capsule (200 mg total) by mouth 2 (two) times daily.  Dispense: 60 capsule; Refill: 5  -     leflunomide (ARAVA) 20 MG Tab; Take 1 tablet (20 mg total) by mouth once daily.  Dispense: 30 tablet; Refill: 6  -     VOLTAREN 1 % Gel; Apply topically 4 (four) times daily.  Dispense: 400 g; Refill: 4  -     TAD; Future; Expected date: 04/27/2018  -     CK; Future; Expected date: 04/27/2018  -     Aldolase; Future; Expected date: 04/27/2018  -     CBC auto differential; Future; Expected date: 04/27/2018  -     Comprehensive metabolic panel; Future; Expected date: 04/27/2018  -     C-reactive protein; Future; Expected date: 04/27/2018  -     Sedimentation rate, manual; Future; Expected date: 04/27/2018  -     Rheumatoid factor; Future; Expected date: 04/27/2018  -     Cyclic citrul peptide antibody, IgG; Future; Expected date: 04/27/2018  -     TSH; Future; Expected date: 04/27/2018  -     T4, free; Future; Expected date: 04/27/2018  -     T3, free; Future; Expected  date: 04/27/2018  -     dexamethasone injection 8 mg; Inject 2 mLs (8 mg total) into the muscle one time.  -     DXA Bone Density Spine And Hip; Future; Expected date: 04/27/2018  -     methylPREDNISolone (MEDROL DOSEPACK) 4 mg tablet; use as directed  Dispense: 1 Package; Refill: 3    ESR raised  -     celecoxib (CELEBREX) 200 MG capsule; Take 1 capsule (200 mg total) by mouth 2 (two) times daily.  Dispense: 60 capsule; Refill: 5  -     leflunomide (ARAVA) 20 MG Tab; Take 1 tablet (20 mg total) by mouth once daily.  Dispense: 30 tablet; Refill: 6  -     VOLTAREN 1 % Gel; Apply topically 4 (four) times daily.  Dispense: 400 g; Refill: 4  -     TAD; Future; Expected date: 04/27/2018  -     CK; Future; Expected date: 04/27/2018  -     Aldolase; Future; Expected date: 04/27/2018  -     CBC auto differential; Future; Expected date: 04/27/2018  -     Comprehensive metabolic panel; Future; Expected date: 04/27/2018  -     C-reactive protein; Future; Expected date: 04/27/2018  -     Sedimentation rate, manual; Future; Expected date: 04/27/2018  -     Rheumatoid factor; Future; Expected date: 04/27/2018  -     Cyclic citrul peptide antibody, IgG; Future; Expected date: 04/27/2018  -     TSH; Future; Expected date: 04/27/2018  -     T4, free; Future; Expected date: 04/27/2018  -     T3, free; Future; Expected date: 04/27/2018  -     dexamethasone injection 8 mg; Inject 2 mLs (8 mg total) into the muscle one time.  -     DXA Bone Density Spine And Hip; Future; Expected date: 04/27/2018  -     methylPREDNISolone (MEDROL DOSEPACK) 4 mg tablet; use as directed  Dispense: 1 Package; Refill: 3    Primary osteoarthritis of both hips  -     celecoxib (CELEBREX) 200 MG capsule; Take 1 capsule (200 mg total) by mouth 2 (two) times daily.  Dispense: 60 capsule; Refill: 5  -     leflunomide (ARAVA) 20 MG Tab; Take 1 tablet (20 mg total) by mouth once daily.  Dispense: 30 tablet; Refill: 6  -     VOLTAREN 1 % Gel; Apply topically 4  (four) times daily.  Dispense: 400 g; Refill: 4  -     TAD; Future; Expected date: 04/27/2018  -     CK; Future; Expected date: 04/27/2018  -     Aldolase; Future; Expected date: 04/27/2018  -     CBC auto differential; Future; Expected date: 04/27/2018  -     Comprehensive metabolic panel; Future; Expected date: 04/27/2018  -     C-reactive protein; Future; Expected date: 04/27/2018  -     Sedimentation rate, manual; Future; Expected date: 04/27/2018  -     Rheumatoid factor; Future; Expected date: 04/27/2018  -     Cyclic citrul peptide antibody, IgG; Future; Expected date: 04/27/2018  -     TSH; Future; Expected date: 04/27/2018  -     T4, free; Future; Expected date: 04/27/2018  -     T3, free; Future; Expected date: 04/27/2018  -     dexamethasone injection 8 mg; Inject 2 mLs (8 mg total) into the muscle one time.  -     DXA Bone Density Spine And Hip; Future; Expected date: 04/27/2018  -     methylPREDNISolone (MEDROL DOSEPACK) 4 mg tablet; use as directed  Dispense: 1 Package; Refill: 3    Disorder of cartilage   -     DXA Bone Density Spine And Hip; Future; Expected date: 04/27/2018

## 2018-04-27 NOTE — PROGRESS NOTES
Administered 2 cc dexamethasone 4mg/cc  to right upper outer gluteal. Pt tolerated well. No acute reaction noted to site. Pt instructed on S/S to report. Pt verbalized understanding.     Lot: [id number]  Exp:

## 2018-04-28 LAB — ALDOLASE SERPL-CCNC: 3 U/L

## 2018-04-30 ENCOUNTER — PES CALL (OUTPATIENT)
Dept: ADMINISTRATIVE | Facility: CLINIC | Age: 63
End: 2018-04-30

## 2018-04-30 LAB — ANA SER QL IF: NORMAL

## 2018-05-04 ENCOUNTER — HOSPITAL ENCOUNTER (OUTPATIENT)
Dept: RADIOLOGY | Facility: HOSPITAL | Age: 63
Discharge: HOME | End: 2018-05-04
Attending: INTERNAL MEDICINE
Payer: MEDICARE

## 2018-05-04 DIAGNOSIS — M25.552 HIP PAIN, LEFT: ICD-10-CM

## 2018-05-04 DIAGNOSIS — R70.0 ESR RAISED: ICD-10-CM

## 2018-05-04 DIAGNOSIS — E03.9 ACQUIRED HYPOTHYROIDISM: ICD-10-CM

## 2018-05-04 DIAGNOSIS — M16.9 OSTEOARTHROSIS OF HIP: ICD-10-CM

## 2018-05-04 DIAGNOSIS — M16.0 PRIMARY OSTEOARTHRITIS OF BOTH HIPS: ICD-10-CM

## 2018-05-04 DIAGNOSIS — M06.9 RHEUMATOID ARTHRITIS OF HAND, UNSPECIFIED LATERALITY, UNSPECIFIED RHEUMATOID FACTOR PRESENCE: ICD-10-CM

## 2018-05-04 DIAGNOSIS — M94.9 DISORDER OF CARTILAGE: ICD-10-CM

## 2018-05-04 PROCEDURE — 77080 DXA BONE DENSITY AXIAL: CPT | Mod: TC,PO

## 2018-05-04 PROCEDURE — 77080 DXA BONE DENSITY AXIAL: CPT | Mod: 26,,, | Performed by: RADIOLOGY

## 2018-06-27 DIAGNOSIS — K21.9 GASTROESOPHAGEAL REFLUX DISEASE WITHOUT ESOPHAGITIS: ICD-10-CM

## 2018-06-28 RX ORDER — OMEPRAZOLE 40 MG/1
CAPSULE, DELAYED RELEASE ORAL
Qty: 30 CAPSULE | Refills: 2 | Status: SHIPPED | OUTPATIENT
Start: 2018-06-28 | End: 2018-09-21

## 2018-07-12 ENCOUNTER — OFFICE VISIT (OUTPATIENT)
Dept: OPTOMETRY | Facility: CLINIC | Age: 63
End: 2018-07-12
Payer: MEDICARE

## 2018-07-12 DIAGNOSIS — H25.13 NUCLEAR SCLEROSIS OF BOTH EYES: ICD-10-CM

## 2018-07-12 DIAGNOSIS — H52.4 ASTIGMATISM WITH PRESBYOPIA, BILATERAL: ICD-10-CM

## 2018-07-12 DIAGNOSIS — H00.14 CHALAZION OF LEFT UPPER EYELID: Primary | ICD-10-CM

## 2018-07-12 DIAGNOSIS — H52.203 ASTIGMATISM WITH PRESBYOPIA, BILATERAL: ICD-10-CM

## 2018-07-12 PROCEDURE — 92012 INTRM OPH EXAM EST PATIENT: CPT | Mod: S$GLB,,, | Performed by: OPTOMETRIST

## 2018-07-12 PROCEDURE — 92015 DETERMINE REFRACTIVE STATE: CPT | Mod: S$GLB,,, | Performed by: OPTOMETRIST

## 2018-07-12 PROCEDURE — 99999 PR PBB SHADOW E&M-EST. PATIENT-LVL III: CPT | Mod: PBBFAC,,, | Performed by: OPTOMETRIST

## 2018-07-12 NOTE — PROGRESS NOTES
HPI     Urgent care--bump on eye lid    Pt complains of bump on TACOS x 1 month. Pt denies any pain-states it   changes in size. Has not tried warm compress. Pt failed her eye exam for   driving test.     Last edited by Loli Begum on 7/12/2018 10:20 AM. (History)        ROS     Positive for: Eyes    Negative for: Constitutional, Gastrointestinal, Neurological, Skin,   Genitourinary, Musculoskeletal, HENT, Endocrine, Cardiovascular,   Respiratory, Psychiatric, Allergic/Imm, Heme/Lymph    Last edited by JOSIAH Mullins, OD on 7/12/2018 10:28 AM. (History)        Assessment /Plan     For exam results, see Encounter Report.    Chalazion of left upper eyelid    Nuclear sclerosis of both eyes    Astigmatism with presbyopia, bilateral      1. Small chalazion TACOS, monitor, not big enough for excision  Hot compress and gentle massage  Call / message if worsening  2. Vis sig OD>>OS w slight myopic shift  CE probable 1-2 years  3. Updated specs rx, gave copy for distance / driving    Patient had dmv form to fill out, so needed to re-refract as will wear distance correction driving    RTC for annual exam / IOP/ DFE later this year, prn if issues prior

## 2018-07-12 NOTE — PATIENT INSTRUCTIONS
"DRY EYES:  Use Over The Counter artificial tears as needed for dry eye symptoms.  Some common brands include:  Systane, Optive, and Refresh.  These drops can be used as frequently as desired, but may be most helpful use during long periods of concentrated work.  For example, reading / working at the computer.  Avoid drops that "get redness out", as these contain medication that may further irritate the eyes.    ALLERGY EYES / SYMPTOMS:    Over the counter medications include--Zaditor and Alaway  Use as directed 1-2 drops daily for symptoms of itching / watering eyes.  These drops will not help for dry eye or exposure symptoms.    Early Cataracts--not visually significant for surgery consultation.    What Are Cataracts?  A clear lens in the eye focuses light. This lets the eye see images sharply. With age, the lens slowly becomes cloudy. The cloudy lens is a cataract. A cataract scatters light and makes it hard for the eye to focus. Cataracts often form in both eyes. But one lens may cloud faster than the other.      The Aging of Your Lens    Your lens may cloud so slowly that you don`t notice any vision changes at first. But as the cataract gets worse, the eye has a harder time focusing. In early stages, glasses may help you see better. As the lens gets cloudier, your doctor may recommend surgery to restore your vision.    "

## 2018-07-12 NOTE — PROGRESS NOTES
HPI     Urgent care--bump on eye lid    Pt complains of bump on TACOS x 1 month. Pt denies any pain-states it   changes in size. Has not tried warm compress. Pt failed her eye exam for   driving test.     Last edited by Loli Begum on 7/12/2018 10:20 AM. (History)        ROS     Positive for: Eyes    Negative for: Constitutional, Gastrointestinal, Neurological, Skin,   Genitourinary, Musculoskeletal, HENT, Endocrine, Cardiovascular,   Respiratory, Psychiatric, Allergic/Imm, Heme/Lymph    Last edited by JOSIAH Mullins, OD on 7/12/2018 10:28 AM. (History)        Assessment /Plan     For exam results, see Encounter Report.    There are no diagnoses linked to this encounter.  ***

## 2018-09-21 ENCOUNTER — OFFICE VISIT (OUTPATIENT)
Dept: RHEUMATOLOGY | Facility: CLINIC | Age: 63
End: 2018-09-21
Payer: MEDICARE

## 2018-09-21 ENCOUNTER — OFFICE VISIT (OUTPATIENT)
Dept: FAMILY MEDICINE | Facility: CLINIC | Age: 63
End: 2018-09-21
Payer: MEDICARE

## 2018-09-21 ENCOUNTER — LAB VISIT (OUTPATIENT)
Dept: LAB | Facility: HOSPITAL | Age: 63
End: 2018-09-21
Attending: INTERNAL MEDICINE
Payer: MEDICARE

## 2018-09-21 VITALS
HEART RATE: 58 BPM | WEIGHT: 219.44 LBS | DIASTOLIC BLOOD PRESSURE: 74 MMHG | HEIGHT: 64 IN | SYSTOLIC BLOOD PRESSURE: 139 MMHG | BODY MASS INDEX: 37.46 KG/M2

## 2018-09-21 VITALS
DIASTOLIC BLOOD PRESSURE: 65 MMHG | HEART RATE: 74 BPM | OXYGEN SATURATION: 97 % | BODY MASS INDEX: 37.38 KG/M2 | SYSTOLIC BLOOD PRESSURE: 120 MMHG | HEIGHT: 64 IN | WEIGHT: 218.94 LBS

## 2018-09-21 DIAGNOSIS — Z00.00 ENCOUNTER FOR PREVENTIVE HEALTH EXAMINATION: Primary | ICD-10-CM

## 2018-09-21 DIAGNOSIS — Z12.31 ENCOUNTER FOR SCREENING MAMMOGRAM FOR MALIGNANT NEOPLASM OF BREAST: ICD-10-CM

## 2018-09-21 DIAGNOSIS — M25.552 HIP PAIN, LEFT: ICD-10-CM

## 2018-09-21 DIAGNOSIS — R70.0 ESR RAISED: ICD-10-CM

## 2018-09-21 DIAGNOSIS — M25.60 STIFFNESS IN JOINT: ICD-10-CM

## 2018-09-21 DIAGNOSIS — R53.83 FATIGUE, UNSPECIFIED TYPE: ICD-10-CM

## 2018-09-21 DIAGNOSIS — M06.9 RHEUMATOID ARTHRITIS INVOLVING MULTIPLE SITES, UNSPECIFIED RHEUMATOID FACTOR PRESENCE: ICD-10-CM

## 2018-09-21 DIAGNOSIS — I10 ESSENTIAL HYPERTENSION: ICD-10-CM

## 2018-09-21 DIAGNOSIS — E55.9 VITAMIN D DEFICIENCY: ICD-10-CM

## 2018-09-21 DIAGNOSIS — D84.821 IMMUNOSUPPRESSED DUE TO CHEMOTHERAPY: ICD-10-CM

## 2018-09-21 DIAGNOSIS — E03.9 ACQUIRED HYPOTHYROIDISM: ICD-10-CM

## 2018-09-21 DIAGNOSIS — R53.82 CHRONIC FATIGUE: ICD-10-CM

## 2018-09-21 DIAGNOSIS — M16.0 PRIMARY OSTEOARTHRITIS OF BOTH HIPS: ICD-10-CM

## 2018-09-21 DIAGNOSIS — T45.1X5A IMMUNOSUPPRESSED DUE TO CHEMOTHERAPY: ICD-10-CM

## 2018-09-21 DIAGNOSIS — Z98.890 HISTORY OF ESOPHAGEAL DILATATION: ICD-10-CM

## 2018-09-21 DIAGNOSIS — E78.5 HYPERLIPIDEMIA, UNSPECIFIED HYPERLIPIDEMIA TYPE: ICD-10-CM

## 2018-09-21 DIAGNOSIS — Z79.899 IMMUNOSUPPRESSED DUE TO CHEMOTHERAPY: ICD-10-CM

## 2018-09-21 DIAGNOSIS — M06.9 RHEUMATOID ARTHRITIS OF HAND, UNSPECIFIED LATERALITY, UNSPECIFIED RHEUMATOID FACTOR PRESENCE: ICD-10-CM

## 2018-09-21 DIAGNOSIS — K21.9 GASTROESOPHAGEAL REFLUX DISEASE, ESOPHAGITIS PRESENCE NOT SPECIFIED: ICD-10-CM

## 2018-09-21 DIAGNOSIS — M25.50 MULTIPLE JOINT PAIN: ICD-10-CM

## 2018-09-21 DIAGNOSIS — M47.816 OSTEOARTHRITIS OF LUMBAR SPINE, UNSPECIFIED SPINAL OSTEOARTHRITIS COMPLICATION STATUS: ICD-10-CM

## 2018-09-21 DIAGNOSIS — J32.9 SINUSITIS, UNSPECIFIED CHRONICITY, UNSPECIFIED LOCATION: ICD-10-CM

## 2018-09-21 DIAGNOSIS — M16.9 OSTEOARTHROSIS OF HIP: ICD-10-CM

## 2018-09-21 DIAGNOSIS — M06.9 RHEUMATOID ARTHRITIS OF HAND, UNSPECIFIED LATERALITY, UNSPECIFIED RHEUMATOID FACTOR PRESENCE: Primary | ICD-10-CM
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
## 2018-09-21 PROBLEM — Z51.81 MEDICATION MONITORING ENCOUNTER: Status: RESOLVED | Noted: 2017-10-11 | Resolved: 2018-09-21

## 2018-09-21 LAB
ALBUMIN SERPL BCP-MCNC: 3.6 G/DL
ALP SERPL-CCNC: 106 U/L
ALT SERPL W/O P-5'-P-CCNC: 16 U/L
ANION GAP SERPL CALC-SCNC: 10 MMOL/L
AST SERPL-CCNC: 18 U/L
BASOPHILS # BLD AUTO: 0.06 K/UL
BASOPHILS NFR BLD: 1.1 %
BILIRUB SERPL-MCNC: 0.4 MG/DL
BUN SERPL-MCNC: 22 MG/DL
CALCIUM SERPL-MCNC: 10.2 MG/DL
CCP AB SER IA-ACNC: <0.5 U/ML
CHLORIDE SERPL-SCNC: 107 MMOL/L
CO2 SERPL-SCNC: 23 MMOL/L
CREAT SERPL-MCNC: 0.8 MG/DL
CRP SERPL-MCNC: 4.8 MG/L
DIFFERENTIAL METHOD: ABNORMAL
EOSINOPHIL # BLD AUTO: 0.1 K/UL
EOSINOPHIL NFR BLD: 2.5 %
ERYTHROCYTE [DISTWIDTH] IN BLOOD BY AUTOMATED COUNT: 13.3 %
ERYTHROCYTE [SEDIMENTATION RATE] IN BLOOD BY WESTERGREN METHOD: 62 MM/HR
EST. GFR  (AFRICAN AMERICAN): >60 ML/MIN/1.73 M^2
EST. GFR  (NON AFRICAN AMERICAN): >60 ML/MIN/1.73 M^2
GLUCOSE SERPL-MCNC: 81 MG/DL
HCT VFR BLD AUTO: 39.6 %
HGB BLD-MCNC: 12.2 G/DL
IMM GRANULOCYTES # BLD AUTO: 0.02 K/UL
IMM GRANULOCYTES NFR BLD AUTO: 0.4 %
LYMPHOCYTES # BLD AUTO: 1.7 K/UL
LYMPHOCYTES NFR BLD: 29.9 %
MCH RBC QN AUTO: 27.7 PG
MCHC RBC AUTO-ENTMCNC: 30.8 G/DL
MCV RBC AUTO: 90 FL
MONOCYTES # BLD AUTO: 0.5 K/UL
MONOCYTES NFR BLD: 8.9 %
NEUTROPHILS # BLD AUTO: 3.2 K/UL
NEUTROPHILS NFR BLD: 57.2 %
NRBC BLD-RTO: 0 /100 WBC
PLATELET # BLD AUTO: 238 K/UL
PMV BLD AUTO: 12.1 FL
POTASSIUM SERPL-SCNC: 3.8 MMOL/L
PROT SERPL-MCNC: 7.7 G/DL
RBC # BLD AUTO: 4.4 M/UL
RHEUMATOID FACT SERPL-ACNC: <10 IU/ML
SODIUM SERPL-SCNC: 140 MMOL/L
T4 FREE SERPL-MCNC: 1.11 NG/DL
TSH SERPL DL<=0.005 MIU/L-ACNC: 2.73 UIU/ML
WBC # BLD AUTO: 5.52 K/UL

## 2018-09-21 PROCEDURE — 86140 C-REACTIVE PROTEIN: CPT

## 2018-09-21 PROCEDURE — 3008F BODY MASS INDEX DOCD: CPT | Mod: CPTII,,, | Performed by: INTERNAL MEDICINE

## 2018-09-21 PROCEDURE — 86431 RHEUMATOID FACTOR QUANT: CPT

## 2018-09-21 PROCEDURE — 99999 PR PBB SHADOW E&M-EST. PATIENT-LVL III: CPT | Mod: PBBFAC,,, | Performed by: INTERNAL MEDICINE

## 2018-09-21 PROCEDURE — 3078F DIAST BP <80 MM HG: CPT | Mod: CPTII,,, | Performed by: INTERNAL MEDICINE

## 2018-09-21 PROCEDURE — 99215 OFFICE O/P EST HI 40 MIN: CPT | Mod: PBBFAC,PO | Performed by: NURSE PRACTITIONER

## 2018-09-21 PROCEDURE — 3074F SYST BP LT 130 MM HG: CPT | Mod: CPTII,S$GLB,, | Performed by: NURSE PRACTITIONER

## 2018-09-21 PROCEDURE — 99999 PR PBB SHADOW E&M-EST. PATIENT-LVL V: CPT | Mod: PBBFAC,,, | Performed by: NURSE PRACTITIONER

## 2018-09-21 PROCEDURE — 99499 UNLISTED E&M SERVICE: CPT | Mod: S$PBB,,, | Performed by: NURSE PRACTITIONER

## 2018-09-21 PROCEDURE — 80053 COMPREHEN METABOLIC PANEL: CPT

## 2018-09-21 PROCEDURE — 84443 ASSAY THYROID STIM HORMONE: CPT

## 2018-09-21 PROCEDURE — 85025 COMPLETE CBC W/AUTO DIFF WBC: CPT

## 2018-09-21 PROCEDURE — 3078F DIAST BP <80 MM HG: CPT | Mod: CPTII,S$GLB,, | Performed by: NURSE PRACTITIONER

## 2018-09-21 PROCEDURE — 3075F SYST BP GE 130 - 139MM HG: CPT | Mod: CPTII,,, | Performed by: INTERNAL MEDICINE

## 2018-09-21 PROCEDURE — 86200 CCP ANTIBODY: CPT

## 2018-09-21 PROCEDURE — 84439 ASSAY OF FREE THYROXINE: CPT

## 2018-09-21 PROCEDURE — 99213 OFFICE O/P EST LOW 20 MIN: CPT | Mod: PBBFAC,27,PO | Performed by: INTERNAL MEDICINE

## 2018-09-21 PROCEDURE — G0439 PPPS, SUBSEQ VISIT: HCPCS | Mod: S$GLB,,, | Performed by: NURSE PRACTITIONER

## 2018-09-21 PROCEDURE — 85651 RBC SED RATE NONAUTOMATED: CPT | Mod: PO

## 2018-09-21 PROCEDURE — 36415 COLL VENOUS BLD VENIPUNCTURE: CPT | Mod: PO

## 2018-09-21 PROCEDURE — 99214 OFFICE O/P EST MOD 30 MIN: CPT | Mod: S$PBB,,, | Performed by: INTERNAL MEDICINE

## 2018-09-21 RX ORDER — LEFLUNOMIDE 20 MG/1
20 TABLET ORAL DAILY
Qty: 30 TABLET | Refills: 6 | Status: SHIPPED | OUTPATIENT
Start: 2018-09-21 | End: 2019-04-24 | Stop reason: SDUPTHER

## 2018-09-21 RX ORDER — CELECOXIB 200 MG/1
200 CAPSULE ORAL 2 TIMES DAILY
Refills: 5 | COMMUNITY
Start: 2018-08-24 | End: 2018-09-21 | Stop reason: SDUPTHER

## 2018-09-21 RX ORDER — AZITHROMYCIN 250 MG/1
TABLET, FILM COATED ORAL
Qty: 6 TABLET | Refills: 0 | Status: SHIPPED | OUTPATIENT
Start: 2018-09-21 | End: 2018-10-25

## 2018-09-21 RX ORDER — CELECOXIB 200 MG/1
200 CAPSULE ORAL 2 TIMES DAILY
Qty: 60 CAPSULE | Refills: 5 | Status: SHIPPED | OUTPATIENT
Start: 2018-09-21 | End: 2019-04-24 | Stop reason: SDUPTHER

## 2018-09-21 RX ORDER — LEVOCETIRIZINE DIHYDROCHLORIDE 5 MG/1
5 TABLET, FILM COATED ORAL NIGHTLY
Qty: 30 TABLET | Refills: 11 | Status: SHIPPED | OUTPATIENT
Start: 2018-09-21 | End: 2019-09-06 | Stop reason: SDUPTHER

## 2018-09-21 ASSESSMENT — ROUTINE ASSESSMENT OF PATIENT INDEX DATA (RAPID3)
MDHAQ FUNCTION SCORE: 1
PSYCHOLOGICAL DISTRESS SCORE: 2.2
PATIENT GLOBAL ASSESSMENT SCORE: 5
PAIN SCORE: 8
TOTAL RAPID3 SCORE: 5.44

## 2018-09-21 NOTE — PATIENT INSTRUCTIONS
Counseling and Referral of Other Preventative  (Italic type indicates deductible and co-insurance are waived)    Patient Name: Vida Ornelas  Today's Date: 9/21/2018    Health Maintenance       Date Due Completion Date    TETANUS VACCINE 08/09/2016 8/9/2006    Influenza Vaccine 08/01/2018 10/11/2017 (Declined)    Override on 10/11/2017: Declined    Mammogram 10/12/2019 10/12/2017    Lipid Panel 11/07/2022 11/7/2017    Colonoscopy 06/15/2025 6/15/2015        No orders of the defined types were placed in this encounter.    The following information is provided to all patients.  This information is to help you find resources for any of the problems found today that may be affecting your health:                Living healthy guide: www.[facility].[place].gov      Understanding Diabetes: www.diabetes.org      Eating healthy: www.cdc.gov/healthyweight      [facility] home safety checklist: www.cdc.gov/steadi/patient.html      Agency on Aging: www.goea.[place].gov      Alcoholics anonymous (AA): www.aa.org      Physical Activity: www.bri.nih.gov/mc8brio      Tobacco use: www.quitwithusla.org

## 2018-09-21 NOTE — PROGRESS NOTES
Subjective:       Patient ID: Vida Ornelas is a 63 y.o. female.    Chief Complaint: Follow-up    RA: on arava, and medrol dose pack.B hip replacement severe nerve pain, and knee and  Thigh soreness L side pain has had multiple injection,. Patient complains of arthralgias and myalgias for which has been present for a few years. Pain is located in multiple joints, both shoulder(s), both elbow(s), both wrist(s), both MCP(s): 1st, 2nd, 3rd, 4th and 5th, both PIP(s): 1st, 2nd, 3rd, 4th and 5th, both DIP(s): 1st and 2nd, both hip(s), both knee(s) and both MTP(s): 1st, 2nd, 3rd, 4th and 5th, is described as aching, pulsating, shooting and throbbing, and is constant, moderate .  Associated symptoms include: crepitation, decreased range of motion, edema, effusion, tenderness and warmth.  Patient states she uses gabapentin it did help to was concerned about weight gain      Review of Systems   Constitutional: Negative for activity change, appetite change and unexpected weight change.   HENT: Negative for dental problem, ear discharge, ear pain, facial swelling, mouth sores, nosebleeds, postnasal drip, rhinorrhea, sinus pressure, sneezing, tinnitus and voice change.    Eyes: Negative for photophobia, pain, discharge, redness and itching.   Respiratory: Negative for apnea, chest tightness, shortness of breath and wheezing.    Cardiovascular: Negative for palpitations and leg swelling.   Gastrointestinal: Negative for abdominal distention, constipation and diarrhea.   Endocrine: Negative for cold intolerance, heat intolerance, polydipsia and polyuria.   Genitourinary: Negative for decreased urine volume, difficulty urinating, flank pain, frequency, hematuria and urgency.   Musculoskeletal: Positive for back pain and neck stiffness. Negative for gait problem.   Skin: Negative for pallor and wound.   Allergic/Immunologic: Negative for immunocompromised state.   Neurological: Negative for dizziness and tremors.  "  Hematological: Negative for adenopathy. Does not bruise/bleed easily.   Psychiatric/Behavioral: Negative for sleep disturbance. The patient is not nervous/anxious.          Objective:     /74 (BP Location: Right arm, Patient Position: Sitting, BP Method: Large (Automatic))   Pulse (!) 58   Ht 5' 4" (1.626 m)   Wt 99.6 kg (219 lb 7.5 oz)   BMI 37.67 kg/m²      Physical Exam   Nursing note and vitals reviewed.  Constitutional: She is oriented to person, place, and time. No distress.   HENT:   Head: Normocephalic and atraumatic.   Mouth/Throat: Oropharynx is clear and moist.   Eyes: EOM are normal. Pupils are equal, round, and reactive to light.   Neck: Neck supple. No thyromegaly present.   Cardiovascular: Normal rate, regular rhythm and normal heart sounds.  Exam reveals no gallop and no friction rub.    No murmur heard.  Pulmonary/Chest: Breath sounds normal. She has no wheezes. She has no rales. She exhibits no tenderness.   Abdominal: There is no tenderness. There is no rebound and no guarding.       Right Side Rheumatological Exam     The patient is tender to palpation of the shoulder, wrist, knee, 1st PIP, 1st MCP, 2nd PIP, 2nd MCP, 3rd PIP, 3rd MCP, 4th PIP, 4th MCP, 5th PIP and 5th MCP    She has swelling of the shoulder, elbow, wrist and knee    Shoulder Exam   Tenderness Location: no tenderness    Range of Motion   Active abduction: abnormal   Adduction: abnormal  Sensation: normal    Knee Exam   Patellofemoral Crepitus: positive  Effusion: positive  Sensation: normal    Hip Exam   Tenderness Location: posterior and anterior    Range of Motion   External rotation: abnormal   Internal rotation: abnormal   Sensation: normal    Elbow/Wrist Exam   Tenderness Location: no tenderness  Sensation: normal    Left Side Rheumatological Exam     Examination finds the elbow normal.    The patient is tender to palpation of the wrist, knee, 1st PIP, 1st MCP, 2nd PIP, 2nd MCP, 3rd PIP, 3rd MCP, 4th PIP, 4th MCP, " 5th PIP and 5th MCP.    She has swelling of the shoulder, wrist and knee    Shoulder Exam   Tenderness Location: no tenderness    Range of Motion   Active abduction: abnormal   Sensation: normal    Knee Exam     Patellofemoral Crepitus: negative  Effusion: negative  Sensation: normal    Hip Exam   Tenderness Location: no tenderness  Sensation: normal    Elbow/Wrist Exam   Sensation: normal      Back/Neck Exam   Tenderness Right paramedian tenderness of the Lower C-Spine and Upper C-Spine.Left paramedian tenderness of the Lower C-Spine and Upper C-Spine.      Lymphadenopathy:     She has no cervical adenopathy.   Neurological: She is alert and oriented to person, place, and time. Gait normal.   Skin: No rash noted. No erythema. No pallor.     Psychiatric: Mood and affect normal.   Musculoskeletal: She exhibits edema, tenderness and deformity.          Results for orders placed or performed in visit on 04/27/18   TAD   Result Value Ref Range    TAD Screen Negative <1:160 Negative <1:160   CK   Result Value Ref Range     20 - 180 U/L   Aldolase   Result Value Ref Range    Aldolase 3.0 1.2 - 7.6 U/L   CBC auto differential   Result Value Ref Range    WBC 5.10 3.90 - 12.70 K/uL    RBC 4.37 4.00 - 5.40 M/uL    Hemoglobin 11.4 (L) 12.0 - 16.0 g/dL    Hematocrit 38.0 37.0 - 48.5 %    MCV 87 82 - 98 fL    MCH 26.1 (L) 27.0 - 31.0 pg    MCHC 30.0 (L) 32.0 - 36.0 g/dL    RDW 15.4 (H) 11.5 - 14.5 %    Platelets 217 150 - 350 K/uL    MPV 11.7 9.2 - 12.9 fL    Immature Granulocytes 0.2 0.0 - 0.5 %    Gran # (ANC) 2.6 1.8 - 7.7 K/uL    Immature Grans (Abs) 0.01 0.00 - 0.04 K/uL    Lymph # 1.8 1.0 - 4.8 K/uL    Mono # 0.5 0.3 - 1.0 K/uL    Eos # 0.2 0.0 - 0.5 K/uL    Baso # 0.04 0.00 - 0.20 K/uL    nRBC 0 0 /100 WBC    Gran% 50.4 38.0 - 73.0 %    Lymph% 34.7 18.0 - 48.0 %    Mono% 10.4 4.0 - 15.0 %    Eosinophil% 3.5 0.0 - 8.0 %    Basophil% 0.8 0.0 - 1.9 %    Differential Method Automated    Comprehensive metabolic panel    Result Value Ref Range    Sodium 140 136 - 145 mmol/L    Potassium 3.9 3.5 - 5.1 mmol/L    Chloride 106 95 - 110 mmol/L    CO2 25 23 - 29 mmol/L    Glucose 86 70 - 110 mg/dL    BUN, Bld 16 6 - 20 mg/dL    Creatinine 0.7 0.5 - 1.4 mg/dL    Calcium 9.8 8.7 - 10.5 mg/dL    Total Protein 7.2 6.0 - 8.4 g/dL    Albumin 3.6 3.5 - 5.2 g/dL    Total Bilirubin 0.4 0.1 - 1.0 mg/dL    Alkaline Phosphatase 108 55 - 135 U/L    AST 25 10 - 40 U/L    ALT 32 10 - 44 U/L    Anion Gap 9 8 - 16 mmol/L    eGFR if African American >60.0 >60 mL/min/1.73 m^2    eGFR if non African American >60.0 >60 mL/min/1.73 m^2   C-reactive protein   Result Value Ref Range    CRP 6.9 0.0 - 8.2 mg/L   Sedimentation rate, manual   Result Value Ref Range    Sed Rate 25 (H) 0 - 20 mm/Hr   Rheumatoid factor   Result Value Ref Range    Rheumatoid Factor <10.0 0.0 - 15.0 IU/mL   Cyclic citrul peptide antibody, IgG   Result Value Ref Range    CCP Antibodies <0.5 <5.0 U/mL   TSH   Result Value Ref Range    TSH 2.997 0.400 - 4.000 uIU/mL   T4, free   Result Value Ref Range    Free T4 1.23 0.71 - 1.51 ng/dL   T3, free   Result Value Ref Range    T3, Free 2.7 2.3 - 4.2 pg/mL           Assessment:       Encounter Diagnoses   Name Primary?    Rheumatoid arthritis of hand, unspecified laterality, unspecified rheumatoid factor presence Yes    Hip pain, left     Osteoarthrosis of hip     Chronic fatigue     Acquired hypothyroidism     ESR raised     Primary osteoarthritis of both hips     Immunosuppressed due to chemotherapy          Plan:     Rheumatoid arthritis of hand, unspecified laterality, unspecified rheumatoid factor presence  -     Comprehensive metabolic panel; Future; Expected date: 09/21/2018  -     CBC auto differential; Future; Expected date: 09/21/2018  -     C-reactive protein; Future; Expected date: 09/21/2018  -     Sedimentation rate; Future; Expected date: 09/21/2018  -     Rheumatoid factor; Future; Expected date: 09/21/2018  -      Cyclic citrul peptide antibody, IgG; Future; Expected date: 09/21/2018  -     leflunomide (ARAVA) 20 MG Tab; Take 1 tablet (20 mg total) by mouth once daily.  Dispense: 30 tablet; Refill: 6  -     TSH; Future; Expected date: 09/21/2018  -     T4, free; Future; Expected date: 09/21/2018    Hip pain, left  -     Comprehensive metabolic panel; Future; Expected date: 09/21/2018  -     CBC auto differential; Future; Expected date: 09/21/2018  -     C-reactive protein; Future; Expected date: 09/21/2018  -     Sedimentation rate; Future; Expected date: 09/21/2018  -     Rheumatoid factor; Future; Expected date: 09/21/2018  -     Cyclic citrul peptide antibody, IgG; Future; Expected date: 09/21/2018  -     leflunomide (ARAVA) 20 MG Tab; Take 1 tablet (20 mg total) by mouth once daily.  Dispense: 30 tablet; Refill: 6  -     TSH; Future; Expected date: 09/21/2018  -     T4, free; Future; Expected date: 09/21/2018    Osteoarthrosis of hip  -     Comprehensive metabolic panel; Future; Expected date: 09/21/2018  -     CBC auto differential; Future; Expected date: 09/21/2018  -     C-reactive protein; Future; Expected date: 09/21/2018  -     Sedimentation rate; Future; Expected date: 09/21/2018  -     Rheumatoid factor; Future; Expected date: 09/21/2018  -     Cyclic citrul peptide antibody, IgG; Future; Expected date: 09/21/2018  -     leflunomide (ARAVA) 20 MG Tab; Take 1 tablet (20 mg total) by mouth once daily.  Dispense: 30 tablet; Refill: 6  -     TSH; Future; Expected date: 09/21/2018  -     T4, free; Future; Expected date: 09/21/2018    Chronic fatigue  -     Comprehensive metabolic panel; Future; Expected date: 09/21/2018  -     CBC auto differential; Future; Expected date: 09/21/2018  -     C-reactive protein; Future; Expected date: 09/21/2018  -     Sedimentation rate; Future; Expected date: 09/21/2018  -     Rheumatoid factor; Future; Expected date: 09/21/2018  -     Cyclic citrul peptide antibody, IgG; Future;  Expected date: 09/21/2018  -     TSH; Future; Expected date: 09/21/2018  -     T4, free; Future; Expected date: 09/21/2018    Acquired hypothyroidism  -     leflunomide (ARAVA) 20 MG Tab; Take 1 tablet (20 mg total) by mouth once daily.  Dispense: 30 tablet; Refill: 6  -     TSH; Future; Expected date: 09/21/2018  -     T4, free; Future; Expected date: 09/21/2018    ESR raised  -     leflunomide (ARAVA) 20 MG Tab; Take 1 tablet (20 mg total) by mouth once daily.  Dispense: 30 tablet; Refill: 6  -     TSH; Future; Expected date: 09/21/2018  -     T4, free; Future; Expected date: 09/21/2018    Primary osteoarthritis of both hips  -     leflunomide (ARAVA) 20 MG Tab; Take 1 tablet (20 mg total) by mouth once daily.  Dispense: 30 tablet; Refill: 6  -     TSH; Future; Expected date: 09/21/2018  -     T4, free; Future; Expected date: 09/21/2018    Immunosuppressed due to chemotherapy    Other orders  -     celecoxib (CELEBREX) 200 MG capsule; Take 1 capsule (200 mg total) by mouth 2 (two) times daily.  Dispense: 60 capsule; Refill: 5

## 2018-09-21 NOTE — PROGRESS NOTES
"Vida Ornelas presented for a  Medicare AWV and comprehensive Health Risk Assessment today. The following components were reviewed and updated:    · Medical history  · Family History  · Social history  · Allergies and Current Medications  · Health Risk Assessment  · Health Maintenance  · Care Team     ** See Completed Assessments for Annual Wellness Visit within the encounter summary.**       The following assessments were completed:  · Living Situation  · CAGE  · Depression Screening  · Timed Get Up and Go  · Whisper Test  · Cognitive Function Screening          · Nutrition Screening  · ADL Screening  · PAQ Screening    Vitals:    09/21/18 0950   BP: 120/65   BP Location: Right arm   Patient Position: Sitting   BP Method: Large (Automatic)   Pulse: 74   SpO2: 97%   Weight: 99.3 kg (218 lb 14.7 oz)   Height: 5' 4" (1.626 m)     Body mass index is 37.58 kg/m².  Physical Exam   Constitutional: She appears well-developed. No distress.   HENT:   Head: Normocephalic.   Cardiovascular: Normal rate and regular rhythm.   No murmur heard.  Pulmonary/Chest: Effort normal and breath sounds normal. No respiratory distress.   Skin: Skin is warm.   Psychiatric: She has a normal mood and affect. Her behavior is normal. Thought content normal.   Vitals reviewed.        Diagnoses and health risks identified today and associated recommendations/orders:    1. Encounter for preventive health examination  Reviewed health maintenance and provided recommendations   Recommend schedule appt to est care with Dr. Morgan martin at this time  Written rx for tdap provided  Pt Will discuss shingrix vaccine with Dr. Kenyon at today's visit    2. Osteoarthritis of lumbar spine, unspecified spinal osteoarthritis complication status  Continue to monitor   Followed by lowell.      3. Essential hypertension  Stable.     Followed by Soledad Leone MD (Inactive) .      4. Hyperlipidemia, unspecified hyperlipidemia type  Continue to " monitor lipids  Followed by Soledad Leone MD (Inactive) .      5. ESR raised  Continue to monitor   Followed by chantale      6. Vitamin D deficiency  Continue to monitor   Followed by Chantale.      7. Acquired hypothyroidism  Continue to monitor   Followed by Soledad Leone MD (Inactive) .      8. Gastroesophageal reflux disease, esophagitis presence not specified  Stable.     Followed by Soledad Leone MD (Inactive) .      9. History of esophageal dilatation  Stable.     Followed by Soledad Leone MD (Inactive) .      10. Multiple joint pain  Continue to monitor   Followed by [place].      11. Rheumatoid arthritis involving multiple sites, unspecified rheumatoid factor presence  Continue to monitor   Taking arava  Followed by Chantale.      12. Stiffness in joint  Stable.     Followed by Chantale.      13. Osteoarthrosis of hip  Follow ortho recommendations  Followed by J Carlos.      14. Fatigue, unspecified type  Continue to monitor   Followed by Soledad Leone MD (Inactive) .        Provided Vida with a 5-10 year written screening schedule and personal prevention plan. Recommendations were developed using the USPSTF age appropriate recommendations. Education, counseling, and referrals were provided as needed. After Visit Summary printed and given to patient which includes a list of additional screenings\tests needed.    Follow-up in about 1 year (around 9/21/2019).    Jayna Rodriguez NP

## 2018-09-24 NOTE — PROGRESS NOTES
Patient, Vida Ornelas (MRN #[id number]), presented with a recorded BMI of 37.58 kg/m^2 and a documented comorbidity(s):  - Hypertension  - Hyperlipidemia  to which the severe obesity is a contributing factor. This is consistent with the definition of severe obesity (BMI 35.0-35.9) with comorbidity (ICD-10 E66.01, Z68.35). The patient's severe obesity was monitored, evaluated, addressed and/or treated. This addendum to the medical record is made on 09/24/2018.

## 2018-10-25 ENCOUNTER — OFFICE VISIT (OUTPATIENT)
Dept: FAMILY MEDICINE | Facility: CLINIC | Age: 63
End: 2018-10-25
Payer: MEDICARE

## 2018-10-25 ENCOUNTER — HOSPITAL ENCOUNTER (OUTPATIENT)
Dept: RADIOLOGY | Facility: HOSPITAL | Age: 63
Discharge: HOME | End: 2018-10-25
Attending: NURSE PRACTITIONER
Payer: MEDICARE

## 2018-10-25 VITALS
BODY MASS INDEX: 38.62 KG/M2 | HEIGHT: 64 IN | SYSTOLIC BLOOD PRESSURE: 138 MMHG | DIASTOLIC BLOOD PRESSURE: 92 MMHG | HEART RATE: 82 BPM | WEIGHT: 226.19 LBS | RESPIRATION RATE: 16 BRPM

## 2018-10-25 DIAGNOSIS — I10 ESSENTIAL HYPERTENSION: ICD-10-CM

## 2018-10-25 DIAGNOSIS — K21.9 GASTROESOPHAGEAL REFLUX DISEASE WITHOUT ESOPHAGITIS: ICD-10-CM

## 2018-10-25 DIAGNOSIS — Z12.31 ENCOUNTER FOR SCREENING MAMMOGRAM FOR MALIGNANT NEOPLASM OF BREAST: ICD-10-CM

## 2018-10-25 DIAGNOSIS — Z00.00 ENCOUNTER FOR PREVENTIVE HEALTH EXAMINATION: ICD-10-CM

## 2018-10-25 DIAGNOSIS — E66.09 CLASS 2 OBESITY DUE TO EXCESS CALORIES WITHOUT SERIOUS COMORBIDITY WITH BODY MASS INDEX (BMI) OF 38.0 TO 38.9 IN ADULT: ICD-10-CM

## 2018-10-25 DIAGNOSIS — E78.49 OTHER HYPERLIPIDEMIA: ICD-10-CM

## 2018-10-25 DIAGNOSIS — M06.09 RHEUMATOID ARTHRITIS OF MULTIPLE SITES WITH NEGATIVE RHEUMATOID FACTOR: ICD-10-CM

## 2018-10-25 DIAGNOSIS — R14.0 ABDOMINAL DISTENTION: ICD-10-CM

## 2018-10-25 DIAGNOSIS — E03.8 OTHER SPECIFIED HYPOTHYROIDISM: Primary | ICD-10-CM

## 2018-10-25 PROCEDURE — 99999 PR PBB SHADOW E&M-EST. PATIENT-LVL IV: CPT | Mod: PBBFAC,,, | Performed by: FAMILY MEDICINE

## 2018-10-25 PROCEDURE — 99214 OFFICE O/P EST MOD 30 MIN: CPT | Mod: S$PBB,,, | Performed by: FAMILY MEDICINE

## 2018-10-25 PROCEDURE — 90714 TD VACC NO PRESV 7 YRS+ IM: CPT | Mod: PBBFAC,PO

## 2018-10-25 PROCEDURE — 77067 SCR MAMMO BI INCL CAD: CPT | Mod: TC,PO

## 2018-10-25 PROCEDURE — 77067 SCR MAMMO BI INCL CAD: CPT | Mod: 26,,, | Performed by: RADIOLOGY

## 2018-10-25 PROCEDURE — 77063 BREAST TOMOSYNTHESIS BI: CPT | Mod: TC,PO

## 2018-10-25 PROCEDURE — 3080F DIAST BP >= 90 MM HG: CPT | Mod: CPTII,,, | Performed by: FAMILY MEDICINE

## 2018-10-25 PROCEDURE — 77063 BREAST TOMOSYNTHESIS BI: CPT | Mod: 26,,, | Performed by: RADIOLOGY

## 2018-10-25 PROCEDURE — 3008F BODY MASS INDEX DOCD: CPT | Mod: CPTII,,, | Performed by: FAMILY MEDICINE

## 2018-10-25 PROCEDURE — 3075F SYST BP GE 130 - 139MM HG: CPT | Mod: CPTII,,, | Performed by: FAMILY MEDICINE

## 2018-10-25 PROCEDURE — 99214 OFFICE O/P EST MOD 30 MIN: CPT | Mod: PBBFAC,PO,25 | Performed by: FAMILY MEDICINE

## 2018-10-25 RX ORDER — PRAVASTATIN SODIUM 20 MG/1
20 TABLET ORAL DAILY
Qty: 90 TABLET | Refills: 3 | Status: SHIPPED | OUTPATIENT
Start: 2018-10-25 | End: 2019-04-24

## 2018-10-25 RX ORDER — LEVOTHYROXINE SODIUM 25 UG/1
TABLET ORAL
Qty: 90 TABLET | Refills: 3 | Status: SHIPPED | OUTPATIENT
Start: 2018-10-25 | End: 2019-04-24 | Stop reason: SDUPTHER

## 2018-10-25 RX ORDER — LOSARTAN POTASSIUM 25 MG/1
25 TABLET ORAL DAILY
Qty: 90 TABLET | Refills: 3 | Status: SHIPPED | OUTPATIENT
Start: 2018-10-25 | End: 2019-04-24 | Stop reason: SDUPTHER

## 2018-10-25 NOTE — PATIENT INSTRUCTIONS
Low-Salt Diet  This diet removes foods that are high in salt. It also limits the amount of salt you use when cooking. It is most often used for people with high blood pressure, edema (fluid retention), and kidney, liver, or heart disease.  Table salt contains the mineral sodium. Your body needs sodium to work normally. But too much sodium can make your health problems worse. Your healthcare provider is recommending a low-salt (also called low-sodium) diet for you. Your total daily allowance of salt is 1,500 to 2,300 milligrams (mg). It is less than 1 teaspoon of table salt. This means you can have only about 500 to 700 mg of sodium at each meal. People with certain health problems should limit salt intake to the lower end of the recommended range.    When you cook, dont add much salt. If you can cook without using salt, even better. Dont add salt to your food at the table.  When shopping, read food labels. Salt is often called sodium on the label. Choose foods that are salt-free, low salt, or very low salt. Note that foods with reduced salt may not lower your salt intake enough.    Beans, potatoes, and pasta  Ok: Dry beans, split peas, lentils, potatoes, rice, macaroni, pasta, spaghetti without added salt  Avoid: Potato chips, tortilla chips, and similar products  Breads and cereals  Ok: Low-sodium breads, rolls, cereals, and cakes; low-salt crackers, matzo crackers  Avoid: Salted crackers, pretzels, popcorn, Italian toast, pancakes, muffins  Dairy  Ok: Milk, chocolate milk, hot chocolate mix, low-salt cheeses, and yogurt  Avoid: Processed cheese and cheese spreads; Roquefort, Camembert, and cottage cheese; buttermilk, instant breakfast drink  Desserts  Ok: Ice cream, frozen yogurt, juice bars, gelatin, cookies and pies, sugar, honey, jelly, hard candy  Avoid: Most pies, cakes and cookies prepared or processed with salt; instant pudding  Drinks  Ok: Tea, coffee, fizzy (carbonated) drinks, juices  Avoid: Flavored  coffees, electrolyte replacement drinks, sports drinks  Meats  Ok: All fresh meat, fish, poultry, low-salt tuna, eggs, egg substitute  Avoid: Smoked, pickled, brine-cured, or salted meats and fish. This includes marquez, chipped beef, corned beef, hot dogs, deli meats, ham, kosher meats, salt pork, sausage, canned tuna, salted codfish, smoked salmon, herring, sardines, or anchovies.  Seasonings and spices  Ok: Most seasonings are okay. Good substitutes for salt include: fresh herb blends, hot sauce, lemon, garlic, bang, vinegar, dry mustard, parsley, cilantro, horseradish, tomato paste, regular margarine, mayonnaise, unsalted butter, cream cheese, vegetable oil, cream, low-salt salad dressing and gravy.  Avoid: Regular ketchup, relishes, pickles, soy sauce, teriyaki sauce, Worcestershire sauce, BBQ sauce, tartar sauce, meat tenderizer, chili sauce, regular gravy, regular salad dressing, salted butter  Soups  Ok: Low-salt soups and broths made with allowed foods  Avoid: Bouillon cubes, soups with smoked or salted meats, regular soup and broth  Vegetables  Ok: Most vegetables are okay; also low-salt tomato and vegetable juices  Avoid: Sauerkraut and other brine-soaked vegetables; pickles and other pickled vegetables; tomato juice, olives  Date Last Reviewed: 8/1/2016 © 2000-2017 [employer]. [address] [place] [address]. All rights reserved. This information is not intended as a substitute for professional medical care. Always follow your healthcare professional's instructions.        Eating Heart-Healthy Food: Using the DASH Plan    Eating for your heart doesnt have to be hard or boring. You just need to know how to make healthier choices. The DASH eating plan has been developed to help you do just that. DASH stands for Dietary Approaches to Stop Hypertension. It is a plan that has been proven to be healthier for your heart and to lower your risk for high blood pressure. It can also help  lower your risk for cancer, heart disease, osteoporosis, and diabetes.  Choosing from each food group  Choose foods from each of the food groups below each day. Try to get the recommended number of servings for each food group. The serving numbers are based on a diet of 2,000 calories a day. Talk to your doctor if youre unsure about your calorie needs. Along with getting the correct servings, the DASH plan also recommends a sodium intake less than 2,300 mg per day.        Grains  Servings: 6 to 8 a day  A serving is:  · 1 slice bread  · 1 ounce dry cereal  · Half a cup cooked rice, pasta or cereal  Best choices: Whole grains and any grains high in fiber. Vegetables  Servings: 4 to 5 a day  A serving is:  · 1 cup raw leafy vegetable  · Half a cup cut-up raw or cooked vegetable  · Half a cup vegetable juice  Best choices: Fresh or frozen vegetables prepared without added salt or fat.   Fruits  Servings: 4 to 5 a day  A serving is:  · 1 medium fruit  · One-quarter cup dried fruit  · Half a cup fresh, frozen, or canned fruit  · Half a cup of 100% fruit juices  Best choices: A variety of fresh fruits of different colors. Whole fruits are a better choice than fruit juices. Low-fat or fat-free dairy  Servings: 2 to 3 a day  A serving is:  · 1 cup milk  · 1 cup yogurt  · One and a half ounces cheese  Best choices: Skim or 1% milk, low-fat or fat-free yogurt or buttermilk, and low-fat cheeses.         Lean meats, poultry, fish  Servings: 6 or fewer a day  A serving is:  · 1 ounce cooked meats, poultry, or fish  · 1 egg  Best choices: Lean poultry and fish. Trim away visible fat. Broil, grill, roast, or boil instead of frying. Remove skin from poultry before eating. Limit how much red meat you eat.  Nuts, seeds, beans  Servings: 4 to 5 a week  A serving is:  · One-third cup nuts (one and a half ounces)  · 2 tablespoons nut butter or seeds  · Half a cup cooked dry beans or legumes  Best choices: Dry roasted nuts with no salt  added, lentils, kidney beans, garbanzo beans, and whole feliz beans.   Fats and oils  Servings: 2 to 3 a day  A serving is:  · 1 teaspoon vegetable oil  · 1 teaspoon soft margarine  · 1 tablespoon mayonnaise  · 2 tablespoons salad dressing  Best choices: Nut and vegetable oils (nontropical vegetable oils), such as olive and canola oil. Sweets  Servings: 5 a week or fewer  A serving is:  · 1 tablespoon sugar, maple syrup, or honey  · 1 tablespoon jam or jelly  · 1 half-ounce jelly beans (about 15)  · 1 cup lemonade  Best choices: Dried fruit can be a satisfying sweet. Choose low-fat sweets. And watch your serving sizes!      For more on the DASH eating plan, visit:  www.nhlbi.nih.gov/health/health-topics/topics/dash   Date Last Reviewed: 6/1/2016  © 5478-8514 [employer]. [address] [place] [address], [place] [address]. All rights reserved. This information is not intended as a substitute for professional medical care. Always follow your healthcare professional's instructions.

## 2018-10-25 NOTE — PROGRESS NOTES
Subjective:       Patient ID: Vida Ornelas is a 63 y.o. female.    Chief Complaint: Establish Care    The patient has history of rheumatoid arthritis and complains of pain of all joints, she currently is seen the rheumatologist.      Hypertension   This is a chronic problem. The current episode started more than 1 year ago. The problem has been gradually worsening since onset. The problem is uncontrolled. Associated symptoms include malaise/fatigue and peripheral edema. Pertinent negatives include no anxiety, blurred vision, chest pain, headaches, neck pain, orthopnea, palpitations, PND, shortness of breath or sweats. There are no associated agents to hypertension. Risk factors for coronary artery disease include dyslipidemia, obesity, post-menopausal state, sedentary lifestyle and family history. Past treatments include angiotensin blockers (The patient stop taking the medication). The current treatment provides significant improvement. Compliance problems include diet and exercise.  There is no history of angina, kidney disease, CVA, heart failure, PVD or retinopathy. There is no history of chronic renal disease or a hypertension causing med.   Hyperlipidemia   This is a chronic problem. The current episode started more than 1 year ago. The problem is uncontrolled. Recent lipid tests were reviewed and are high. Exacerbating diseases include hypothyroidism and obesity. She has no history of chronic renal disease, diabetes or liver disease. Factors aggravating her hyperlipidemia include fatty foods. Pertinent negatives include no chest pain, focal weakness or shortness of breath. Current antihyperlipidemic treatment includes statins. The current treatment provides moderate improvement of lipids. Compliance problems include adherence to exercise and adherence to diet (The patient stop taking her cholesterol medication for a while).  Risk factors for coronary artery disease include dyslipidemia, family history,  hypertension, obesity and a sedentary lifestyle.   Gastroesophageal Reflux   She complains of early satiety and heartburn. She reports no abdominal pain, no belching, no chest pain, no choking, no coughing, no globus sensation, no hoarse voice, no sore throat, no stridor or no tooth decay. Abdominal distention. This is a chronic problem. The current episode started more than 1 year ago. The problem occurs frequently. The problem has been gradually worsening. The heartburn is of moderate intensity. The heartburn wakes her from sleep. The heartburn limits her activity. The symptoms are aggravated by certain foods. Associated symptoms include fatigue. Pertinent negatives include no anemia, melena, muscle weakness, orthopnea or weight loss. Risk factors include obesity. She has tried an antacid (She was taking Zantac in the past but she stop) for the symptoms. The treatment provided significant relief.     Past medical history, past social history was reviewed and discussed with the patient    Review of Systems   Constitutional: Positive for fatigue and malaise/fatigue. Negative for activity change, appetite change and weight loss.   HENT: Negative for congestion, dental problem, hoarse voice and sore throat.    Eyes: Negative for blurred vision, discharge and itching.   Respiratory: Negative for cough, choking, chest tightness and shortness of breath.    Cardiovascular: Positive for leg swelling. Negative for chest pain, palpitations, orthopnea and PND.   Gastrointestinal: Positive for heartburn. Negative for abdominal distention, abdominal pain and melena.   Endocrine: Negative for cold intolerance and heat intolerance.   Genitourinary: Negative for dysuria and flank pain.   Musculoskeletal: Negative for arthralgias, back pain, muscle weakness and neck pain.   Skin: Negative for pallor and rash.   Allergic/Immunologic: Negative for environmental allergies and food allergies.   Neurological: Negative for dizziness,  focal weakness, facial asymmetry and headaches.   Hematological: Negative for adenopathy. Does not bruise/bleed easily.       Objective:      Physical Exam   Constitutional: She appears well-developed and well-nourished. No distress.   Difficulty to walk   HENT:   Head: Normocephalic and atraumatic.   Right Ear: External ear normal.   Left Ear: External ear normal.   Nose: Nose normal.   Mouth/Throat: Oropharynx is clear and moist. No oropharyngeal exudate.   Eyes: Conjunctivae are normal. Pupils are equal, round, and reactive to light. Right eye exhibits no discharge. Left eye exhibits no discharge. No scleral icterus.   Neck: Neck supple. No tracheal deviation present. No thyromegaly present.   Cardiovascular: Normal rate, regular rhythm, normal heart sounds and intact distal pulses.   No murmur heard.  Pulmonary/Chest: Effort normal and breath sounds normal. No respiratory distress. She has no wheezes.   Abdominal: She exhibits no distension. There is no tenderness.   Musculoskeletal: She exhibits edema (Trace edema on bilateral lower extremities). She exhibits no deformity.   Neurological: No cranial nerve deficit. Coordination normal.   Skin: She is not diaphoretic. No erythema.   Psychiatric: She has a normal mood and affect. Her behavior is normal. Judgment and thought content normal.   Nursing note and vitals reviewed.      Assessment:       1. Other specified hypothyroidism    2. Essential hypertension    3. Other hyperlipidemia    4. Rheumatoid arthritis of multiple sites with negative rheumatoid factor    5. Class 2 obesity due to excess calories without serious comorbidity with body mass index (BMI) of 38.0 to 38.9 in adult    6. Abdominal distention        Plan:       Other specified hypothyroidism:  Stable  -     levothyroxine (SYNTHROID) 25 MCG tablet; TAKE ONE TABLET BY MOUTH EVERY MORNING before breakfast and hold all food and meds for 30 minutes after taking  Dispense: 90 tablet; Refill:  3    Essential hypertension:  Uncontrolled  -     NURSING COMMUNICATION: [facility] Account  -     Hypertension Digital Medicine ([facility]) Enrollment Order  -     Hypertension Digital Medicine ([facility]): Assign Onboarding Questionnaires  -     losartan (COZAAR) 25 MG tablet; Take 1 tablet (25 mg total) by mouth once daily.  Dispense: 90 tablet; Refill: 3  Other hyperlipidemia:  Uncontrolled  -     pravastatin (PRAVACHOL) 20 MG tablet; Take 1 tablet (20 mg total) by mouth once daily.  Dispense: 90 tablet; Refill: 3  Rheumatoid arthritis of multiple sites with negative rheumatoid factor:  Stable. The patient is been followed by the rheumatologist for this problem.    Class 2 obesity due to excess calories without serious comorbidity with body mass index (BMI) of 38.0 to 38.9 in adult:  Worsening    Abdominal distention:  New problem, next visit workup    Other orders  -      -     (In Office Administered) Td Vaccine - Preservative Free    GERD:  Worsening  -     ranitidine (ZANTAC) 300 MG tablet; Take 1 tablet (300 mg total) by mouth every evening.  Dispense: 90 tablet; Refill: 3    Will restart the patient on Zantac 300 mg at bedtime.  0 also pravastatin for cholesterol 20 mg and losartan 25 mg 1 tablet p.o. q.day.  Will recheck the patient back in 3 months.  Will refer the patient for digital hypertension.  He will notify us if he wants the shingles vaccine, she will contact her insurance to make sure is cover.  The patient's BMI has been recorded in the chart. The patient has been provided educational materials regarding the benefits of attaining and maintaining a normal weight. We will continue to address and follow this issue during follow up visits.Patient agreed with assessment and plan. Patient verbalized understanding.

## 2018-10-26 ENCOUNTER — TELEPHONE (OUTPATIENT)
Dept: RHEUMATOLOGY | Facility: CLINIC | Age: 63
End: 2018-10-26

## 2018-10-26 NOTE — TELEPHONE ENCOUNTER
Returned patient call and informed no one from this office tried to reach this morning or yesterday. Patient asked when next appointment is date and time provided.

## 2018-10-26 NOTE — TELEPHONE ENCOUNTER
----- Message from Alberto Rivera sent at 10/26/2018  9:21 AM CDT -----  Contact: patient  Type:  Patient Returning Call    Who Called:  patient  Who Left Message for Patient: not sure  Does the patient know what this is regarding?:  no  Best Call Back Number:  [phone]  Additional Information:  Requesting a call back,stated received a call

## 2018-11-01 ENCOUNTER — TELEPHONE (OUTPATIENT)
Dept: RHEUMATOLOGY | Facility: CLINIC | Age: 63
End: 2018-11-01

## 2018-11-01 NOTE — TELEPHONE ENCOUNTER
----- Message from Alissa Jones sent at 10/31/2018  9:54 AM CDT -----  Pt returning call for lab results / call [phone] (home)   .

## 2018-11-01 NOTE — TELEPHONE ENCOUNTER
Spoke with pt, advised of results. She reports still stiff and sore all over. Inquires what else she can take? Please advise.

## 2018-11-02 NOTE — TELEPHONE ENCOUNTER
Patient normally does not like steroids but a low-dose steroid possibly 5 mg daily could help she can always come in and get steroid shot she usually does not do that

## 2018-11-06 ENCOUNTER — TELEPHONE (OUTPATIENT)
Dept: GASTROENTEROLOGY | Facility: CLINIC | Age: 63
End: 2018-11-06

## 2018-11-06 RX ORDER — OMEPRAZOLE 40 MG/1
40 CAPSULE, DELAYED RELEASE ORAL
COMMUNITY
Start: 2017-09-18 | End: 2018-11-20

## 2018-11-06 NOTE — TELEPHONE ENCOUNTER
Pt instr at last visit was instr to take Omeprazole 40mg daily in the morning and Zantac at night. Pt states has not been taking Omeprazole but is willing to try med as NP recommended. Prilosec called into [place] pharmacy.

## 2018-11-06 NOTE — TELEPHONE ENCOUNTER
----- Message from Alissa  sent at 11/6/2018  8:02 AM CST -----  Pt is taking zantac is not helping / acid reflux.. Stomach burns / after eating / please call  [phone]  She saw Miss Gann 11/20/17 and the medication she gave her worked better (cannot remember name ) ... Going on a cruise in a few weeks / is willing to come in / next available is in December .... [facility] [place], [place] - [address] [facility] [address] [address] [facility]  [place] [address]  Phone: [phone] Fax: [phone]

## 2018-11-07 NOTE — TELEPHONE ENCOUNTER
LVM returning pt's call to see if still having joint stiffness and pain with Dr. Kenyon's advisement.

## 2018-11-09 RX ORDER — PREDNISONE 5 MG/1
15 TABLET ORAL DAILY
Qty: 90 TABLET | Refills: 0 | Status: CANCELLED | OUTPATIENT
Start: 2018-11-09

## 2018-11-09 NOTE — TELEPHONE ENCOUNTER
Contacted patient and would like to try oral steroids at this time if no better can come in for decadron injection per Dr Kenyon. Script called into pharmacy

## 2018-11-20 ENCOUNTER — OFFICE VISIT (OUTPATIENT)
Dept: FAMILY MEDICINE | Facility: CLINIC | Age: 63
End: 2018-11-20
Payer: MEDICARE

## 2018-11-20 VITALS
SYSTOLIC BLOOD PRESSURE: 118 MMHG | HEART RATE: 79 BPM | DIASTOLIC BLOOD PRESSURE: 78 MMHG | HEIGHT: 64 IN | WEIGHT: 224.19 LBS | OXYGEN SATURATION: 96 % | BODY MASS INDEX: 38.27 KG/M2

## 2018-11-20 DIAGNOSIS — K29.70 GASTRITIS, PRESENCE OF BLEEDING UNSPECIFIED, UNSPECIFIED CHRONICITY, UNSPECIFIED GASTRITIS TYPE: Primary | ICD-10-CM

## 2018-11-20 PROCEDURE — 3078F DIAST BP <80 MM HG: CPT | Mod: CPTII,HCWC,S$GLB, | Performed by: NURSE PRACTITIONER

## 2018-11-20 PROCEDURE — 99999 PR PBB SHADOW E&M-EST. PATIENT-LVL IV: CPT | Mod: PBBFAC,HCWC,, | Performed by: NURSE PRACTITIONER

## 2018-11-20 PROCEDURE — 99214 OFFICE O/P EST MOD 30 MIN: CPT | Mod: HCWC,S$GLB,, | Performed by: NURSE PRACTITIONER

## 2018-11-20 PROCEDURE — 3008F BODY MASS INDEX DOCD: CPT | Mod: CPTII,HCWC,S$GLB, | Performed by: NURSE PRACTITIONER

## 2018-11-20 PROCEDURE — 3074F SYST BP LT 130 MM HG: CPT | Mod: CPTII,HCWC,S$GLB, | Performed by: NURSE PRACTITIONER

## 2018-11-20 RX ORDER — DEXLANSOPRAZOLE 60 MG/1
60 CAPSULE, DELAYED RELEASE ORAL DAILY
Qty: 30 CAPSULE | Refills: 0 | Status: SHIPPED | OUTPATIENT
Start: 2018-11-20 | End: 2018-12-27 | Stop reason: SDUPTHER

## 2018-11-20 NOTE — PATIENT INSTRUCTIONS
Stop omeprazole, start the dexilant, continue ranitidine. Take Carafate 3 times daily for the next 10 days. Let Dr Payne know that she is having these symptoms since starting the prednisone.

## 2018-11-20 NOTE — PROGRESS NOTES
Subjective:       Patient ID: Vida Ornelas is a 63 y.o. female.    Chief Complaint: GI Problem    Patient says she was having diarrhea about 2 weeks ago, lasted about a week, has resolved and having normal Bms. Stomach started to burn this week and noticing increased gas.   Patient was on omeprazole in am and zantac in pm, saw GI 09/17 for GERD and blood in stool and had EGD at that time. Symptoms had been resolved, but then started again after the diarrhea episode. Says dexilant worled well for her in past and she would liek to get back on this.  Patient started oral prednisone by her RA about a week ago, right before stomach symptoms began.   There are no preventive care reminders to display for this patient.    Past Medical History:  Past Medical History:  No date: Arthritis      Comment:  Seronegative rheumatoid arthritis  No date: Back pain      Comment:  s/p ena   No date: Cataract  No date: Colon polyp  No date: ESR raised  No date: Former smoker  No date: GERD with stricture  No date: Hyperlipidemia  No date: Hypertension  No date: Hypothyroidism  No date: Irritable bowel syndrome  No date: Peptic ulcer disease  No date: RA (rheumatoid arthritis)  No date: Vitamin D deficiency  Past Surgical History:  6/15/2015: COLONOSCOPY      Comment:  Dr. lepe: colonic spasm consistent with IBS, otherwise                normal findings; repeat in 5 years for surveillance due                to family history of colon cancer in father  6/15/2015: COLONOSCOPY; N/A      Comment:  Performed by Juan David Lepe Jr., MD at [facility]  2010? 2011?  (Stevie): COLONOSCOPY W/ POLYPECTOMY      Comment:  Benign polyps removed.  early 2000s??: ESOPHAGOGASTRODUODENOSCOPY  10/3/2017: ESOPHAGOGASTRODUODENOSCOPY (EGD); N/A      Comment:  Performed by Juan David Lepe Jr., MD at [facility]  4/15/2016: ESOPHAGOGASTRODUODENOSCOPY (EGD); N/A      Comment:  Performed by Juan David Lepe Jr., MD at [facility]  5/13/2015:  ESOPHAGOGASTRODUODENOSCOPY (EGD); N/A      Comment:  Performed by Juan David Davis Jr., MD at [facility]  5/13/2015  Ryan: ESOPHAGOSCOPY W/ DILATION      Comment:  Non-erosive esophageal reflux (NERD) disease?.                 Dysphagia, esophageal spasm?   Esophagus dilated, 51 Fr.                Antral erythema.  DAYNA Test  Negative.  No date: HYSTERECTOMY  No date: JOINT REPLACEMENT  4/15/2016: SMALL BOWEL ENDOSCOPY-UPPER; N/A      Comment:  Performed by Juan David Davis Jr., MD at [facility]  2/2/2015: TOTAL HIP ARTHROPLASTY; Right  5/2015: UPPER GASTROINTESTINAL ENDOSCOPY  No date: UPPER GASTROINTESTINAL ENDOSCOPY      Comment:  dilation  04/15/2016: UPPER GASTROINTESTINAL ENDOSCOPY      Comment:  Dr. Davis  10/03/2017: UPPER GASTROINTESTINAL ENDOSCOPY      Comment:  Dr. Davis  Review of patient's allergies indicates:   -- Ibuprofen -- Diarrhea    --  Stomach pains             States can take   -- Nifedipine     --  Pt does not remember reaction   -- Plaquenil (hydroxychloroquine) -- Diarrhea   -- Tacrolimus     --  Pt does not remember reaction   -- Penicillins -- Itching  Current Outpatient Medications on File Prior to Visit:  azelastine (ASTELIN) 137 mcg (0.1 %) nasal spray, 1 spray (137 mcg total) by Nasal route 2 (two) times daily., Disp: 30 mL, Rfl: 11  celecoxib (CELEBREX) 200 MG capsule, Take 1 capsule (200 mg total) by mouth 2 (two) times daily., Disp: 60 capsule, Rfl: 5  hydrocodone-acetaminophen 10-325mg (NORCO)  mg Tab, Take 1 tablet by mouth., Disp: , Rfl:   leflunomide (ARAVA) 20 MG Tab, Take 1 tablet (20 mg total) by mouth once daily., Disp: 30 tablet, Rfl: 6  levocetirizine (XYZAL) 5 MG tablet, Take 1 tablet (5 mg total) by mouth every evening., Disp: 30 tablet, Rfl: 11  levothyroxine (SYNTHROID) 25 MCG tablet, TAKE ONE TABLET BY MOUTH EVERY MORNING before breakfast and hold all food and meds for 30 minutes after taking, Disp: 90 tablet, Rfl: 3  lidocaine HCL 2% (XYLOCAINE) 2 % jelly, Apply  ONE a small amount to affected area three times a day as needed, Disp: , Rfl: 1  losartan (COZAAR) 25 MG tablet, Take 1 tablet (25 mg total) by mouth once daily., Disp: 90 tablet, Rfl: 3  multivitamin (ONE DAILY MULTIVITAMIN) per tablet, Take 1 tablet by mouth once daily., Disp: , Rfl:   omeprazole (PRILOSEC) 40 MG capsule, Take 40 mg by mouth., Disp: , Rfl:   pravastatin (PRAVACHOL) 20 MG tablet, Take 1 tablet (20 mg total) by mouth once daily., Disp: 90 tablet, Rfl: 3  ranitidine (ZANTAC) 300 MG tablet, Take 1 tablet (300 mg total) by mouth every evening., Disp: 90 tablet, Rfl: 3  VOLTAREN 1 % Gel, Apply topically 4 (four) times daily., Disp: 400 g, Rfl: 4     No current facility-administered medications on file prior to visit.     Social History    Socioeconomic History      Marital status:       Spouse name: Not on file      Number of children: 3      Years of education: Not on file      Highest education level: Not on file    Social Needs      Financial resource strain: Not on file      Food insecurity - worry: Not on file      Food insecurity - inability: Not on file      Transportation needs - medical: Not on file      Transportation needs - non-medical: Not on file    Occupational History      Not on file    Tobacco Use      Smoking status: Former Smoker        Quit date: 1975        Years since quittin.4      Smokeless tobacco: Never Used    Substance and Sexual Activity      Alcohol use: No        Alcohol/week: 0.0 oz      Drug use: No      Sexual activity: Yes    Other Topics      Concerns:        Not on file    Social History Narrative      Not on file    Review of patient's family history indicates:  Problem: Rheum arthritis      Relation: Mother          Age of Onset: (Not Specified)  Problem: Arthritis      Relation: Mother          Age of Onset: (Not Specified)          Comment: rheumatoid arthiritis  Problem: Lupus      Relation: Sister          Age of Onset: (Not  Specified)  Problem: Hypertension      Relation: Sister          Age of Onset: (Not Specified)  Problem: Arthritis      Relation: Sister          Age of Onset: (Not Specified)          Comment: rheumatoid arthritis  Problem: Macular degeneration      Relation: Sister          Age of Onset: (Not Specified)  Problem: Cancer      Relation: Father          Age of Onset: (Not Specified)          Comment: colon CA  Problem: Arthritis      Relation: Father          Age of Onset: (Not Specified)          Comment: rheumatoid arthritis  Problem: Colon cancer      Relation: Father          Age of Onset: (Not Specified)          Comment: unsure of age of diagnosis,  at 87 years old  Problem: Cataracts      Relation: Father          Age of Onset: (Not Specified)  Problem: Breast cancer      Relation: Neg Hx          Age of Onset: (Not Specified)  Problem: Crohn's disease      Relation: Neg Hx          Age of Onset: (Not Specified)  Problem: Ulcerative colitis      Relation: Neg Hx          Age of Onset: (Not Specified)  Problem: Stomach cancer      Relation: Neg Hx          Age of Onset: (Not Specified)  Problem: Esophageal cancer      Relation: Neg Hx          Age of Onset: (Not Specified)  Problem: Amblyopia      Relation: Neg Hx          Age of Onset: (Not Specified)  Problem: Blindness      Relation: Neg Hx          Age of Onset: (Not Specified)  Problem: Diabetes      Relation: Neg Hx          Age of Onset: (Not Specified)  Problem: Glaucoma      Relation: Neg Hx          Age of Onset: (Not Specified)  Problem: Retinal detachment      Relation: Neg Hx          Age of Onset: (Not Specified)  Problem: Strabismus      Relation: Neg Hx          Age of Onset: (Not Specified)  Problem: Stroke      Relation: Neg Hx          Age of Onset: (Not Specified)  Problem: Thyroid disease      Relation: Neg Hx          Age of Onset: (Not Specified)            Review of Systems   Constitutional: Negative for chills and fever.    Respiratory: Negative.    Cardiovascular: Negative.    Gastrointestinal: Positive for abdominal pain. Negative for blood in stool, diarrhea, nausea and vomiting.   Neurological: Positive for headaches. Negative for dizziness and light-headedness.       Objective:      Physical Exam   Constitutional: She is oriented to person, place, and time. No distress.   HENT:   Head: Normocephalic and atraumatic.   Eyes: Pupils are equal, round, and reactive to light.   Neck: Normal range of motion.   Cardiovascular: Normal rate and regular rhythm. Exam reveals no friction rub.   No murmur heard.  Pulmonary/Chest: Effort normal and breath sounds normal.   Abdominal: Soft. Bowel sounds are normal. There is no tenderness. There is no CVA tenderness.       Musculoskeletal: She exhibits no edema.   Neurological: She is alert and oriented to person, place, and time.   Skin: She is not diaphoretic.   Psychiatric: She has a normal mood and affect. Her behavior is normal.   Vitals reviewed.      Assessment:       1. Gastritis, presence of bleeding unspecified, unspecified chronicity, unspecified gastritis type        Plan:       1. Gastritis, presence of bleeding unspecified, unspecified chronicity, unspecified gastritis type  Switch omeprazole to dexilant, continue ranitidine, take carafate three times daily, notify Dr Payne about symptoms since starting prednisone, schedule gi follow up. Follow up immediately for worsening.   - Ambulatory referral to Gastroenterology  - Occult blood x 1, stool; Future  - Occult blood x 1, stool; Future  - Occult blood x 1, stool; Future

## 2018-11-21 DIAGNOSIS — M16.9 OSTEOARTHROSIS OF HIP: ICD-10-CM

## 2018-11-21 DIAGNOSIS — M06.9 RHEUMATOID ARTHRITIS OF HAND, UNSPECIFIED LATERALITY, UNSPECIFIED RHEUMATOID FACTOR PRESENCE: ICD-10-CM

## 2018-11-21 DIAGNOSIS — E03.9 ACQUIRED HYPOTHYROIDISM: ICD-10-CM

## 2018-11-21 DIAGNOSIS — R70.0 ESR RAISED: ICD-10-CM

## 2018-11-21 DIAGNOSIS — M25.552 HIP PAIN, LEFT: ICD-10-CM

## 2018-11-21 DIAGNOSIS — M16.0 PRIMARY OSTEOARTHRITIS OF BOTH HIPS: ICD-10-CM

## 2018-11-21 RX ORDER — CELECOXIB 200 MG/1
CAPSULE ORAL
Qty: 60 CAPSULE | Refills: 5 | Status: SHIPPED | OUTPATIENT
Start: 2018-11-21 | End: 2018-12-14 | Stop reason: SDUPTHER

## 2018-11-23 ENCOUNTER — LAB VISIT (OUTPATIENT)
Dept: LAB | Facility: HOSPITAL | Age: 63
End: 2018-11-23
Attending: NURSE PRACTITIONER
Payer: MEDICARE

## 2018-11-23 DIAGNOSIS — K29.70 GASTRITIS, PRESENCE OF BLEEDING UNSPECIFIED, UNSPECIFIED CHRONICITY, UNSPECIFIED GASTRITIS TYPE: ICD-10-CM

## 2018-11-23 LAB
OB PNL STL: NEGATIVE
OB PNL STL: POSITIVE
OB PNL STL: POSITIVE

## 2018-11-23 PROCEDURE — 82272 OCCULT BLD FECES 1-3 TESTS: CPT | Mod: 91,HCWC,PO

## 2018-12-14 ENCOUNTER — HOSPITAL ENCOUNTER (OUTPATIENT)
Dept: RADIOLOGY | Facility: HOSPITAL | Age: 63
Discharge: HOME | End: 2018-12-14
Attending: PHYSICIAN ASSISTANT
Payer: MEDICARE

## 2018-12-14 ENCOUNTER — OFFICE VISIT (OUTPATIENT)
Dept: GASTROENTEROLOGY | Facility: CLINIC | Age: 63
End: 2018-12-14
Payer: MEDICARE

## 2018-12-14 ENCOUNTER — TELEPHONE (OUTPATIENT)
Dept: SPINE | Facility: CLINIC | Age: 63
End: 2018-12-14

## 2018-12-14 ENCOUNTER — OFFICE VISIT (OUTPATIENT)
Dept: SPINE | Facility: CLINIC | Age: 63
End: 2018-12-14
Payer: MEDICARE

## 2018-12-14 VITALS
HEART RATE: 78 BPM | DIASTOLIC BLOOD PRESSURE: 81 MMHG | BODY MASS INDEX: 39.55 KG/M2 | SYSTOLIC BLOOD PRESSURE: 156 MMHG | HEIGHT: 64 IN | RESPIRATION RATE: 18 BRPM | WEIGHT: 231.63 LBS

## 2018-12-14 VITALS
HEART RATE: 73 BPM | BODY MASS INDEX: 39.48 KG/M2 | DIASTOLIC BLOOD PRESSURE: 78 MMHG | SYSTOLIC BLOOD PRESSURE: 145 MMHG | WEIGHT: 231.25 LBS | HEIGHT: 64 IN

## 2018-12-14 DIAGNOSIS — Z79.1 NSAID LONG-TERM USE: ICD-10-CM

## 2018-12-14 DIAGNOSIS — K92.1 BLACK STOOL: ICD-10-CM

## 2018-12-14 DIAGNOSIS — Z51.81 ENCOUNTER FOR MONITORING LONG-TERM PROTON PUMP INHIBITOR THERAPY: ICD-10-CM

## 2018-12-14 DIAGNOSIS — K21.00 GASTROESOPHAGEAL REFLUX DISEASE WITH ESOPHAGITIS: Primary | ICD-10-CM

## 2018-12-14 DIAGNOSIS — Z79.899 ENCOUNTER FOR MONITORING LONG-TERM PROTON PUMP INHIBITOR THERAPY: ICD-10-CM

## 2018-12-14 DIAGNOSIS — M54.2 CERVICALGIA: ICD-10-CM

## 2018-12-14 DIAGNOSIS — M54.2 CERVICALGIA: Primary | ICD-10-CM

## 2018-12-14 DIAGNOSIS — Z87.11 HISTORY OF PEPTIC ULCER DISEASE: ICD-10-CM

## 2018-12-14 DIAGNOSIS — R19.5 OCCULT BLOOD POSITIVE STOOL: ICD-10-CM

## 2018-12-14 PROCEDURE — 72052 X-RAY EXAM NECK SPINE 6/>VWS: CPT | Mod: 26,,, | Performed by: RADIOLOGY

## 2018-12-14 PROCEDURE — 3008F BODY MASS INDEX DOCD: CPT | Mod: CPTII,S$GLB,, | Performed by: PHYSICIAN ASSISTANT

## 2018-12-14 PROCEDURE — 3078F DIAST BP <80 MM HG: CPT | Mod: CPTII,S$GLB,, | Performed by: PHYSICIAN ASSISTANT

## 2018-12-14 PROCEDURE — 99203 OFFICE O/P NEW LOW 30 MIN: CPT | Mod: S$GLB,,, | Performed by: PHYSICIAN ASSISTANT

## 2018-12-14 PROCEDURE — 99999 PR PBB SHADOW E&M-EST. PATIENT-LVL IV: CPT | Mod: PBBFAC,,, | Performed by: NURSE PRACTITIONER

## 2018-12-14 PROCEDURE — 3077F SYST BP >= 140 MM HG: CPT | Mod: CPTII,S$GLB,, | Performed by: NURSE PRACTITIONER

## 2018-12-14 PROCEDURE — 99999 PR PBB SHADOW E&M-EST. PATIENT-LVL V: CPT | Mod: PBBFAC,,, | Performed by: PHYSICIAN ASSISTANT

## 2018-12-14 PROCEDURE — 3008F BODY MASS INDEX DOCD: CPT | Mod: CPTII,S$GLB,, | Performed by: NURSE PRACTITIONER

## 2018-12-14 PROCEDURE — 72052 X-RAY EXAM NECK SPINE 6/>VWS: CPT | Mod: TC,PO

## 2018-12-14 PROCEDURE — 3079F DIAST BP 80-89 MM HG: CPT | Mod: CPTII,S$GLB,, | Performed by: NURSE PRACTITIONER

## 2018-12-14 PROCEDURE — 3077F SYST BP >= 140 MM HG: CPT | Mod: CPTII,S$GLB,, | Performed by: PHYSICIAN ASSISTANT

## 2018-12-14 PROCEDURE — 99214 OFFICE O/P EST MOD 30 MIN: CPT | Mod: S$GLB,,, | Performed by: NURSE PRACTITIONER

## 2018-12-14 RX ORDER — SUCRALFATE 1 G/1
1 TABLET ORAL 4 TIMES DAILY
COMMUNITY
End: 2018-12-14 | Stop reason: SDUPTHER

## 2018-12-14 RX ORDER — MELOXICAM 7.5 MG/1
TABLET ORAL
Refills: 6 | COMMUNITY
Start: 2018-11-20 | End: 2019-04-24

## 2018-12-14 RX ORDER — SUCRALFATE 1 G/1
1 TABLET ORAL
Qty: 120 TABLET | Refills: 0 | Status: SHIPPED | OUTPATIENT
Start: 2018-12-14 | End: 2019-01-10 | Stop reason: SDUPTHER

## 2018-12-14 NOTE — PROGRESS NOTES
Subjective:       Patient ID: Vida Ornelas is a 63 y.o. female Body mass index is 39.75 kg/m².    Chief Complaint: Follow-up (stool specimen with blood )    Established patient of Dr. Davis & myself.    Gastroesophageal Reflux   She complains of abdominal pain (started with burning to stomach ~early 11/2018; epigastric and periumbilical area; improving; reports was not taking prilosec when symptoms occurred and her arthritis flared up with the cold weather; worse with eating) and heartburn (stomach burns but denies GERD). She reports no chest pain, no choking, no coughing, no dysphagia, no globus sensation, no hoarse voice, no nausea, no sore throat or no water brash. This is a chronic problem. Episode onset: several years ago. The problem has been gradually improving (since she started acid reducing medications). The heartburn does not wake her from sleep. The symptoms are aggravated by stress and certain foods (red sauce, spicy foods, peanut, sweets). Associated symptoms include melena (black stool for the past month). Pertinent negatives include no fatigue or weight loss (trying to lose weight with dieting). Risk factors include caffeine use, obesity, NSAIDs and smoking/tobacco exposure (mobic PRN which is rare & celebrex BID for arthritis; former smoker). She has tried a PPI and a histamine-2 antagonist (dexilant 60 mg once daily; carafate 1 gram qid; zantac 300 mg nightly; PAST: omeprazole ) for the symptoms. The treatment provided moderate relief. Past procedures include an abdominal ultrasound, an EGD and a UGI.     Review of Systems   Constitutional: Positive for appetite change (decreased since pain with eating). Negative for chills, diaphoresis, fatigue, unexpected weight change and weight loss (trying to lose weight with dieting).   HENT: Negative for hoarse voice, mouth sores, sore throat and trouble swallowing (resolved).    Respiratory: Negative for cough, choking and shortness of breath.     Cardiovascular: Negative for chest pain.   Gastrointestinal: Positive for abdominal pain (started with burning to stomach ~early 11/2018; epigastric and periumbilical area; improving; reports was not taking prilosec when symptoms occurred and her arthritis flared up with the cold weather; worse with eating), heartburn (stomach burns but denies GERD) and melena (black stool for the past month). Negative for abdominal distention, anal bleeding, constipation, diarrhea, dysphagia, nausea, rectal pain and vomiting.   Genitourinary: Negative for difficulty urinating, flank pain and urgency.   Musculoskeletal: Negative for back pain.        Sees rheumatology for RA   Neurological: Negative for weakness.       Past Medical History:   Diagnosis Date    Arthritis     Seronegative rheumatoid arthritis    Back pain     s/p ena     Cataract     Colon polyp     ESR raised     Former smoker     GERD with stricture     Hyperlipidemia     Hypertension     Hypothyroidism     Irritable bowel syndrome     Peptic ulcer disease     RA (rheumatoid arthritis)     Vitamin D deficiency      Past Surgical History:   Procedure Laterality Date    COLONOSCOPY  6/15/2015    Dr. lepe: colonic spasm consistent with IBS, otherwise normal findings; repeat in 5 years for surveillance due to family history of colon cancer in father    COLONOSCOPY N/A 6/15/2015    Performed by [facility] Jr[facility]    COLONOSCOPY W/ POLYPECTOMY  2010? 2011?  (Stevie)    Benign polyps removed.    ESOPHAGOGASTRODUODENOSCOPY  early 2000s??    ESOPHAGOGASTRODUODENOSCOPY (EGD) N/A 10/3/2017    Per[facility] by [facility] Jr[facility] END[facility]    ESOPHAGOGASTRODUODENOSCOPY (EGD) N/A 4/15/2016    Per[facility] by [facility] END[facility]    ESOPHAGOGASTRODUODENOSCOPY (EGD) N/A 5/13/2015    Performed by [facility] END[facility]    ESOPHAGOSCOPY W/ DILATION  5/13/2015  Ryan    Non-erosive esophageal reflux (NERD)  disease?.  Dysphagia, esophageal spasm?   Esophagus dilated, 51 Fr.  Antral erythema.  DAYNA Test  Negative.    HYSTERECTOMY      JOINT REPLACEMENT      SMALL BOWEL ENDOSCOPY-UPPER N/A 4/15/2016    Performed by Juan David Davis Jr., MD [facility]    TOTAL HIP ARTHROPLASTY Right 2015    UPPER GASTROINTESTINAL ENDOSCOPY  2015    UPPER GASTROINTESTINAL ENDOSCOPY      dilation    UPPER GASTROINTESTINAL ENDOSCOPY  04/15/2016    Dr. Davis    UPPER GASTROINTESTINAL ENDOSCOPY  10/03/2017    Dr. Davis     Family History   Problem Relation Age of Onset    Rheum arthritis Mother     Arthritis Mother         rheumatoid arthiritis    Lupus Sister     Hypertension Sister     Arthritis Sister         rheumatoid arthritis    Macular degeneration Sister     Cancer Father         colon CA    Arthritis Father         rheumatoid arthritis    Colon cancer Father         unsure of age of diagnosis,  at 85 years old    Cataracts Father     Breast cancer Neg Hx     Crohn's disease Neg Hx     Ulcerative colitis Neg Hx     Stomach cancer Neg Hx     Esophageal cancer Neg Hx     Amblyopia Neg Hx     Blindness Neg Hx     Diabetes Neg Hx     Glaucoma Neg Hx     Retinal detachment Neg Hx     Strabismus Neg Hx     Stroke Neg Hx     Thyroid disease Neg Hx      Wt Readings from Last 10 Encounters:   18 105.1 kg (231 lb 9.6 oz)   18 104.9 kg (231 lb 4.2 oz)   18 101.7 kg (224 lb 3.3 oz)   10/25/18 102.6 kg (226 lb 3.1 oz)   18 99.6 kg (219 lb 7.5 oz)   18 99.3 kg (218 lb 14.7 oz)   18 99.5 kg (219 lb 4 oz)   18 99.3 kg (218 lb 14.7 oz)   17 96 kg (211 lb 10.3 oz)   17 95.7 kg (210 lb 15.7 oz)     Lab Results   Component Value Date    WBC 5.52 2018    HGB 12.2 2018    HCT 39.6 2018    MCV 90 2018     2018     Lab Results   Component Value Date    OCCULTBLOOD Positive (A) 2018    OCCULTBLOOD Positive (A) 2018     OCCULTBLOOD Negative 11/23/2018     CMP  Sodium   Date Value Ref Range Status   09/21/2018 140 136 - 145 mmol/L Final     Potassium   Date Value Ref Range Status   09/21/2018 3.8 3.5 - 5.1 mmol/L Final     Chloride   Date Value Ref Range Status   09/21/2018 107 95 - 110 mmol/L Final     CO2   Date Value Ref Range Status   09/21/2018 23 23 - 29 mmol/L Final     Glucose   Date Value Ref Range Status   09/21/2018 81 70 - 110 mg/dL Final     BUN, Bld   Date Value Ref Range Status   09/21/2018 22 8 - 23 mg/dL Final     Creatinine   Date Value Ref Range Status   09/21/2018 0.8 0.5 - 1.4 mg/dL Final     Calcium   Date Value Ref Range Status   09/21/2018 10.2 8.7 - 10.5 mg/dL Final     Total Protein   Date Value Ref Range Status   09/21/2018 7.7 6.0 - 8.4 g/dL Final     Albumin   Date Value Ref Range Status   09/21/2018 3.6 3.5 - 5.2 g/dL Final     Total Bilirubin   Date Value Ref Range Status   09/21/2018 0.4 0.1 - 1.0 mg/dL Final     Comment:     For infants and newborns, interpretation of results should be based  on gestational age, weight and in agreement with clinical  observations.  Premature Infant recommended reference ranges:  Up to 24 hours.............<8.0 mg/dL  Up to 48 hours............<12.0 mg/dL  3-5 days..................<15.0 mg/dL  6-29 days.................<15.0 mg/dL       Alkaline Phosphatase   Date Value Ref Range Status   09/21/2018 106 55 - 135 U/L Final     AST   Date Value Ref Range Status   09/21/2018 18 10 - 40 U/L Final     ALT   Date Value Ref Range Status   09/21/2018 16 10 - 44 U/L Final     Anion Gap   Date Value Ref Range Status   09/21/2018 10 8 - 16 mmol/L Final     eGFR if    Date Value Ref Range Status   09/21/2018 >60.0 >60 mL/min/1.73 m^2 Final     eGFR if non    Date Value Ref Range Status   09/21/2018 >60.0 >60 mL/min/1.73 m^2 Final     Comment:     Calculation used to obtain the estimated glomerular filtration  rate (eGFR) is the CKD-EPI  "equation.        Lab Results   Component Value Date    LIPASE 44 03/07/2016     Lab Results   Component Value Date    AMYLASE 72 03/07/2016     Lab Results   Component Value Date    TSH 2.733 09/21/2018     Lab Results   Component Value Date    HDMPIURN20 >2000 (H) 09/25/2017 9/25/17 magnesium level WNL    Reviewed prior medical records including radiology report of 3/22/16 UGI with SB (severe GERD and esophageal dysmotility), 3/1/16 abdominal ultrasound, & endoscopy history (see surgical history).    6/15/15 Colonoscopy was reviewed and procedure report states:   " Impression: - Mild colonic spasm consistent with irritable bowel   syndrome.  - The examination was otherwise normal.  - The examined portion of the ileum was normal.  - No specimens collected.  Recommendation: - Discharge patient to home.  - High fiber diet.  - Take a PROBIOTIC, such as a carton of GREEK YOGURT   (Chobani or Oikos, or Activia or Dannon); or tablets   of ALIGN or CULTURELLE or NEW-Q (all   non-prescription), every day for a month.  - Repeat colonoscopy in 6 years for screening   purposes.  - Use Bentyl (dicyclomine) 10 mg PO QID 30 min AC.  - Continue present medications.  - Patient has a contact number available for   emergencies. The signs and symptoms of potential   delayed complications were discussed with the   patient. Return to normal activities tomorrow.   Written discharge instructions were provided to the   patient.  - Return to normal activities tomorrow. ".    10/3/17 EGD was reviewed and procedure report states:   " Findings:       The oropharynx was normal.       The proximal esophagus and mid esophagus were normal.       Slight reflux esophagitis was seen in the distal esophagus. Biopsies        were taken with a cold forceps for histology.       The Z-line was regular and was found 36 cm from the incisors.       Some laxness of the lower esophageal sphincter was noted, but I did        not see a definite hiatal " "hernia.       Minimal inflammation characterized by erythema was found in the        prepyloric region of the stomach. Biopsies were taken with a cold        forceps for Helicobacter pylori testing using CLOtest. Biopsies were        taken with a cold forceps for histology.       The stomach was otherwise normal.       The examined duodenum was normal.       No endoscopic abnormality was evident in the esophagus to explain        the patient's complaint of dysphagia. It was decided, however, to        proceed with dilation of the entire esophagus. A guidewire was        placed and the scope was withdrawn. Dilation was performed with a        Savary dilator with no resistance at 54 Fr.  Impression:          - Normal oropharynx.                       - Normal proximal esophagus and mid esophagus.                       - Minimal reflux esophagitis. Biopsied.                       - Z-line regular, 36 cm from the incisors.                       - Minimal antritis. Biopsied.                       - Normal stomach otherwise.                       - Normal examined duodenum.                       - No endoscopic esophageal abnormality to explain                        patient's dysphagia. Esophagus dilated, 54 Fr.  Recommendation:      - Discharge patient to home.                       - Await pathology results.                       - Follow an antireflux regimen.                       - Continue present medications.                       - Use Prilosec (omeprazole) 40 mg PO daily.                       - Use Zantac (ranitidine) 300 mg PO daily.                       - Use sucralfate tablets 1 gram PO QID.                       - Return to GI clinic in 6-8 weeks. ".  Biopsy results:    jaclyn test negative  "Supplemental Diagnosis  2. Stomach, biopsy:  - Negative for Helicobacter pylori by specific immunohistochemical stain. Immunohistochemical stains have  satisfactory positive and negative controls.  (Electronically " "Signed: 2017-10-06 13:57:14 )  Diagnosed by: Eric Contreras M.D.  FINAL PATHOLOGIC DIAGNOSIS  1. Esophagus, distal, biopsy:  Benign esophageal squamous mucosa with changes compatible with reflux. There is no acute inflammation,  increase in intraepithelial eosinophils, or viral cytopathic change.  2. Stomach, biopsy:  Reactive gastropathy. There is no significant inflammation and no intestinal metaplasia or epithelial dysplasia  present. Negative for Helicobacter pylori by H&E stain."  Objective:      Physical Exam   Constitutional: She is oriented to person, place, and time. She appears well-developed and well-nourished. No distress.   HENT:   Mouth/Throat: Oropharynx is clear and moist and mucous membranes are normal. No oral lesions. No oropharyngeal exudate.   Eyes: Conjunctivae are normal. Pupils are equal, round, and reactive to light. No scleral icterus.   Cardiovascular: Normal rate and regular rhythm.   Pulmonary/Chest: Effort normal and breath sounds normal. No respiratory distress. She has no wheezes. She has no rhonchi.   Abdominal: Soft. Normal appearance and bowel sounds are normal. She exhibits no distension, no abdominal bruit and no mass. There is no hepatosplenomegaly. There is no tenderness. There is no rigidity, no rebound, no guarding, no tenderness at McBurney's point and negative Woodard's sign.   Neurological: She is alert and oriented to person, place, and time.   Skin: Skin is warm and dry. No rash noted. She is not diaphoretic. No erythema. No pallor.   Non-jaundiced   Psychiatric: She has a normal mood and affect. Her behavior is normal.   Nursing note and vitals reviewed.      Assessment:       1. Gastroesophageal reflux disease with esophagitis    2. Black stool    3. Occult blood positive stool    4. Encounter for monitoring long-term proton pump inhibitor therapy    5. History of peptic ulcer disease    6. NSAID long-term use        Plan:       Gastroesophageal reflux disease with " esophagitis  -  REFILL   sucralfate (CARAFATE) 1 gram tablet; Take 1 tablet (1 g total) by mouth 4 (four) times daily before meals and nightly.  Dispense: 120 tablet; Refill: 0  - CONTINUE DEXILANT 60 MG ONCE DAILY AS DIRECTED,  take in the morning before breakfast, discussed about possible long term use of medication, pt verbalized understanding and wants to continue current medications at current dosage  - CONTINUE ZANTAC 300 MG NIGHTLY AS DIRECTED  -discussed the different types of medications used to treat reflux and how to use them, antacids can be used PRN for breakthrough heartburn symptoms by reducing stomach acid that is already produced, H2 blockers (zantac) work by limiting the amount acid production, & PPI's work to block acid production and are taken daily, patient verbalized understanding.  - continue zantac 300 mg once daily as directed  - continue lifestyle modifications to help control reflux including: avoid large meals, avoid eating within 2-3 hours of bedtime (avoid late night eating & lying down soon after eating), elevate head of bed if nocturnal symptoms are present, & weight loss.   - avoid known foods which trigger reflux symptoms & to minimize/avoid high-fat foods, chocolate, caffeine, citrus, alcohol, & tomato products.  - avoid/limit use of NSAID's, since they can cause GI upset, bleeding, and/or ulcers. If needed, take with food.    Black stool & Occult blood positive stool  -   REFILL  sucralfate (CARAFATE) 1 gram tablet; Take 1 tablet (1 g total) by mouth 4 (four) times daily before meals and nightly.  Dispense: 120 tablet; Refill: 0  - schedule EGD, discussed procedure with patient, patient verbalized understanding  - Possible COLONOSCOPY pending results of testing and if symptoms persist    Encounter for monitoring long-term proton pump inhibitor therapy  -     Vitamin B12; Future; Expected date: 12/27/2018  -     Magnesium; Future; Expected date: 12/27/2018  - discussed with patient  about long term use of reflux medications (preference to use lowest effective dose or discontinuing if possible), the risk and benefits of using these medications long term, the risk of untreated GERD such as martinez's esophagus, and recommend a diet high in calcium and/or taking OTC calcium and vitamin d supplements as directed (such as Citracal +D), pt verbalized understanding & patient wants to continue current medications at current dosages for now.  - recommend annual monitoring with blood work to include CMP, CBC, vitamin B12, and magnesium.    History of peptic ulcer disease & NSAID long-term use  -   REFILL  sucralfate (CARAFATE) 1 gram tablet; Take 1 tablet (1 g total) by mouth 4 (four) times daily before meals and nightly.  Dispense: 120 tablet; Refill: 0  - schedule EGD, discussed procedure with patient, patient verbalized understanding  - avoid/minimize use of NSAIDs- since they can cause GI upset, bleeding and/or ulcers. If NSAID must be taken, recommend take with food.    Follow-up in about 1 month (around 1/14/2019), or if symptoms worsen or fail to improve.    If no improvement in symptoms or symptoms worsen, call/follow-up at clinic or go to ER.

## 2018-12-14 NOTE — TELEPHONE ENCOUNTER
----- Message from Kianna Castillo PA-C sent at 12/14/2018  3:36 PM CST -----  Please let her know that her results have been reviewed.  She has normal age related arthritic changes in her neck.  There is no abnormal movement of any of the bones.  I would like her to proceed with PT.  If no improvement we can consider other treatement options.

## 2018-12-14 NOTE — PATIENT INSTRUCTIONS

## 2018-12-14 NOTE — LETTER
December 27, 2018      [place]  [id number] [address]  [facility] [address]           [facility]  [address] [facility] [address] [place] [address]  Phone: [phone]          Patient: Vida Ornelas   MR Number: [id number]   YOB: 1955   Date of Visit: 12/14/2018       Dear Jemima Parr:    Thank you for referring Vida Ornelas to me for evaluation. Attached you will find relevant portions of my assessment and plan of care.    If you have questions, please do not hesitate to call me. I look forward to following Vida Ornelas along with you.    Sincerely,    Saniya Gann, [facility]    Enclosure  CC:  No Recipients    If you would like to receive this communication electronically, please contact [email or web address] or [phone] to request more information on [facility] access.    For providers and/or their staff who would like to refer a patient to [facility], please contact us through our one-stop-shop provider referral line, Brianna Nicole, at [phone].    If you feel you have received this communication in error or would no longer like to receive these types of communications, please e-mail [email or web address]

## 2018-12-14 NOTE — PROGRESS NOTES
Neurosurgery History & Physical    Patient ID: Vida Ornelas is a 63 y.o. female.    Chief Complaint   Patient presents with    Neck Pain     She has had pain in the posterior neck for the last year. Pain comes and goes. She has a knot in the posterior neck. Lying with her head turned to the right makes pain worse.       Review of Systems   Constitutional: Negative for activity change, appetite change, chills, fever and unexpected weight change.   HENT: Negative for tinnitus, trouble swallowing and voice change.    Respiratory: Negative for apnea, cough, chest tightness and shortness of breath.    Cardiovascular: Negative for chest pain and palpitations.   Gastrointestinal: Negative for constipation, diarrhea, nausea and vomiting.   Genitourinary: Negative for difficulty urinating, dysuria, frequency and urgency.   Musculoskeletal: Positive for neck pain and neck stiffness. Negative for back pain and gait problem.   Skin: Negative for wound.   Neurological: Negative for dizziness, tremors, seizures, facial asymmetry, speech difficulty, weakness, light-headedness, numbness and headaches.   Psychiatric/Behavioral: Negative for confusion and decreased concentration.       Past Medical History:   Diagnosis Date    Arthritis     Seronegative rheumatoid arthritis    Back pain     s/p ena     Cataract     Colon polyp     ESR raised     Former smoker     GERD with stricture     Hyperlipidemia     Hypertension     Hypothyroidism     Irritable bowel syndrome     Peptic ulcer disease     RA (rheumatoid arthritis)     Vitamin D deficiency      Social History     Socioeconomic History    Marital status:      Spouse name: Not on file    Number of children: 3    Years of education: Not on file    Highest education level: Not on file   Social Needs    Financial resource strain: Not on file    Food insecurity - worry: Not on file    Food insecurity - inability: Not on file    Transportation needs -  medical: Not on file    Transportation needs - non-medical: Not on file   Occupational History    Not on file   Tobacco Use    Smoking status: Former Smoker     Last attempt to quit: 1975     Years since quittin.5    Smokeless tobacco: Never Used   Substance and Sexual Activity    Alcohol use: No     Alcohol/week: 0.0 oz    Drug use: No    Sexual activity: Yes   Other Topics Concern    Not on file   Social History Narrative    Not on file     Family History   Problem Relation Age of Onset    Rheum arthritis Mother     Arthritis Mother         rheumatoid arthiritis    Lupus Sister     Hypertension Sister     Arthritis Sister         rheumatoid arthritis    Macular degeneration Sister     Cancer Father         colon CA    Arthritis Father         rheumatoid arthritis    Colon cancer Father         unsure of age of diagnosis,  at 85 years old    Cataracts Father     Breast cancer Neg Hx     Crohn's disease Neg Hx     Ulcerative colitis Neg Hx     Stomach cancer Neg Hx     Esophageal cancer Neg Hx     Amblyopia Neg Hx     Blindness Neg Hx     Diabetes Neg Hx     Glaucoma Neg Hx     Retinal detachment Neg Hx     Strabismus Neg Hx     Stroke Neg Hx     Thyroid disease Neg Hx      Review of patient's allergies indicates:   Allergen Reactions    Ibuprofen Diarrhea     Stomach pains  States can take    Nifedipine      Pt does not remember reaction    Plaquenil [hydroxychloroquine] Diarrhea    Tacrolimus      Pt does not remember reaction    Penicillins Itching       Current Outpatient Medications:     azelastine (ASTELIN) 137 mcg (0.1 %) nasal spray, 1 spray (137 mcg total) by Nasal route 2 (two) times daily., Disp: 30 mL, Rfl: 11    celecoxib (CELEBREX) 200 MG capsule, Take 1 capsule (200 mg total) by mouth 2 (two) times daily., Disp: 60 capsule, Rfl: 5    dexlansoprazole (DEXILANT) 60 mg capsule, Take 1 capsule (60 mg total) by mouth once daily., Disp: 30 capsule, Rfl:  "0    hydrocodone-acetaminophen 10-325mg (NORCO)  mg Tab, Take 1 tablet by mouth., Disp: , Rfl:     leflunomide (ARAVA) 20 MG Tab, Take 1 tablet (20 mg total) by mouth once daily., Disp: 30 tablet, Rfl: 6    levocetirizine (XYZAL) 5 MG tablet, Take 1 tablet (5 mg total) by mouth every evening., Disp: 30 tablet, Rfl: 11    levothyroxine (SYNTHROID) 25 MCG tablet, TAKE ONE TABLET BY MOUTH EVERY MORNING before breakfast and hold all food and meds for 30 minutes after taking, Disp: 90 tablet, Rfl: 3    lidocaine HCL 2% (XYLOCAINE) 2 % jelly, Apply ONE a small amount to affected area three times a day as needed, Disp: , Rfl: 1    losartan (COZAAR) 25 MG tablet, Take 1 tablet (25 mg total) by mouth once daily., Disp: 90 tablet, Rfl: 3    meloxicam (MOBIC) 7.5 MG tablet, TAKE ONE TABLET BY MOUTH ONCE DAILY WITH FOOD prn, Disp: , Rfl: 6    multivitamin (ONE DAILY MULTIVITAMIN) per tablet, Take 1 tablet by mouth once daily., Disp: , Rfl:     pravastatin (PRAVACHOL) 20 MG tablet, Take 1 tablet (20 mg total) by mouth once daily., Disp: 90 tablet, Rfl: 3    ranitidine (ZANTAC) 300 MG tablet, Take 1 tablet (300 mg total) by mouth every evening., Disp: 90 tablet, Rfl: 3    VOLTAREN 1 % Gel, Apply topically 4 (four) times daily., Disp: 400 g, Rfl: 4    sucralfate (CARAFATE) 1 gram tablet, Take 1 tablet (1 g total) by mouth 4 (four) times daily before meals and nightly., Disp: 120 tablet, Rfl: 0    Vitals:    12/14/18 1340   BP: (!) 145/78   BP Location: Left arm   Patient Position: Sitting   BP Method: Large (Automatic)   Pulse: 73   Weight: 104.9 kg (231 lb 4.2 oz)   Height: 5' 4" (1.626 m)       Physical Exam   Constitutional: She is oriented to person, place, and time. She appears well-developed and well-nourished.   HENT:   Head: Normocephalic and atraumatic.   Eyes: Pupils are equal, round, and reactive to light.   Neck: Normal range of motion. Neck supple.   Cardiovascular: Normal rate.   Pulmonary/Chest: " Effort normal.   Musculoskeletal: Normal range of motion. She exhibits no edema.   Neurological: She is alert and oriented to person, place, and time. She has a normal Finger-Nose-Finger Test, a normal Heel to Shin Test, a normal Romberg Test and a normal Tandem Gait Test. Gait normal.   Reflex Scores:       Tricep reflexes are 2+ on the right side and 2+ on the left side.       Bicep reflexes are 2+ on the right side and 2+ on the left side.       Brachioradialis reflexes are 2+ on the right side and 2+ on the left side.       Patellar reflexes are 2+ on the right side and 2+ on the left side.       Achilles reflexes are 2+ on the right side and 2+ on the left side.  Skin: Skin is warm, dry and intact.   Psychiatric: She has a normal mood and affect. Her speech is normal and behavior is normal. Judgment and thought content normal.   Nursing note and vitals reviewed.      Neurologic Exam     Mental Status   Oriented to person, place, and time.   Oriented to person.   Oriented to place.   Oriented to time.   Follows 3 step commands.   Attention: normal. Concentration: normal.   Speech: speech is normal   Level of consciousness: alert  Knowledge: consistent with education.   Able to name object. Able to read. Able to repeat. Able to write. Normal comprehension.     Cranial Nerves     CN II   Visual acuity: normal  Right visual field deficit: none  Left visual field deficit: none     CN III, IV, VI   Pupils are equal, round, and reactive to light.  Right pupil: Size: 3 mm. Shape: regular. Reactivity: brisk. Consensual response: intact.   Left pupil: Size: 3 mm. Shape: regular. Reactivity: brisk. Consensual response: intact.   CN III: no CN III palsy  CN VI: no CN VI palsy  Nystagmus: none   Diplopia: none  Ophthalmoparesis: none  Conjugate gaze: present    CN V   Right facial sensation deficit: none  Left facial sensation deficit: none    CN VII   Right facial weakness: none  Left facial weakness: none    CN VIII    Hearing: intact    CN IX, X   CN IX normal.   CN X normal.     CN XI   Right sternocleidomastoid strength: normal  Left sternocleidomastoid strength: normal  Right trapezius strength: normal  Left trapezius strength: normal    CN XII   Fasciculations: absent  Tongue deviation: none    Motor Exam   Muscle bulk: normal  Overall muscle tone: normal  Right arm pronator drift: absent  Left arm pronator drift: absent    Strength   Right neck flexion: 5/5  Left neck flexion: 5/5  Right neck extension: 5/5  Left neck extension: 5/5  Right deltoid: 5/5  Left deltoid: 5/5  Right biceps: 5/5  Left biceps: 5/5  Right triceps: 5/5  Left triceps: 5/5  Right wrist flexion: 5/5  Left wrist flexion: 5/5  Right wrist extension: 5/5  Left wrist extension: 5/5  Right interossei: 5/5  Left interossei: 5/5  Right abdominals: 5/5  Left abdominals: 5/5  Right iliopsoas: 5/5  Left iliopsoas: 5/5  Right quadriceps: 5/5  Left quadriceps: 5/5  Right hamstrin/5  Left hamstrin/5  Right glutei: 5/5  Left glutei: 5/5  Right anterior tibial: 5/5  Left anterior tibial: 5/5  Right posterior tibial: 5/5  Left posterior tibial: 5/5  Right peroneal: 5/5  Left peroneal: 5/5  Right gastroc: 5/5  Left gastroc: 5/5    Sensory Exam   Right arm light touch: normal  Left arm light touch: normal  Right leg light touch: normal  Left leg light touch: normal  Right arm vibration: normal  Left arm vibration: normal  Right arm pinprick: normal  Left arm pinprick: normal    Gait, Coordination, and Reflexes     Gait  Gait: normal    Coordination   Romberg: negative  Finger to nose coordination: normal  Heel to shin coordination: normal  Tandem walking coordination: normal    Tremor   Resting tremor: absent  Intention tremor: absent  Action tremor: absent    Reflexes   Right brachioradialis: 2+  Left brachioradialis: 2+  Right biceps: 2+  Left biceps: 2+  Right triceps: 2+  Left triceps: 2+  Right patellar: 2+  Left patellar: 2+  Right achilles: 2+  Left  achilles: 2+  Right Peterson: absent  Left Peterson: absent  Right ankle clonus: absent  Left ankle clonus: absent      Provider dictation:  63-year-old female with rheumatoid arthritis and obesity is self-referred for evaluation of neck pain.  She describes chronic intermittent posterior neck pain associated with limited range of motion due to tightness in the neck.  Pain is worse at night with laying down.  She feels intermittent knots in the neck.  She is taking Norco, Celebrex, and Voltaren gel for pain.  She has not had physical therapy or epidural steroid injection.   NDI:  52%.  PHQ:  4.    She is neurologically intact.    She has not had any imaging.    In conclusion this is a 63-year-old female with rheumatoid arthritis and chronic posterior neck pain.  She has no radiculopathy and no neurological deficits on exam.  Pain is most likely myofascial in origin.  Because pain has lasted for an extended amount of time, we will obtain x-rays of the neck today to assess alignment and for any severe degenerative changes.  I recommend physical therapy for neck pain.  Follow-up in clinic if no improvement at which time we could consider further imaging if indicated by x-rays or referral for injections.    Visit Diagnosis:  Cervicalgia  -     X-Ray Cervical Spine 5 View W Flex Extxt; Future; Expected date: 12/14/2018  -     Ambulatory Referral to Physical/Occupational Therapy        Total time spent counseling greater than fifty percent of total visit time.  Counseling included discussion regarding imaging findings, diagnosis possibilities, treatment options, risks and benefits.   The patient had many questions regarding the options and long-term effects.

## 2018-12-17 ENCOUNTER — TELEPHONE (OUTPATIENT)
Dept: SPINE | Facility: CLINIC | Age: 63
End: 2018-12-17

## 2018-12-17 NOTE — TELEPHONE ENCOUNTER
----- Message from Lashawn Elliott sent at 12/17/2018  9:15 AM CST -----  Contact: Self  Type:  Patient Returning Call    Who Called:  Patient   Who Left Message for Patient:  SRIKANTH  Does the patient know what this is regarding?:  Yes   Best Call Back Number:  [phone] (home)     Additional Information:

## 2018-12-27 RX ORDER — DEXLANSOPRAZOLE 60 MG/1
60 CAPSULE, DELAYED RELEASE ORAL DAILY
Qty: 30 CAPSULE | Refills: 0 | Status: SHIPPED | OUTPATIENT
Start: 2018-12-27 | End: 2019-01-25 | Stop reason: SDUPTHER

## 2019-01-02 ENCOUNTER — TELEPHONE (OUTPATIENT)
Dept: RHEUMATOLOGY | Facility: CLINIC | Age: 64
End: 2019-01-02

## 2019-01-02 ENCOUNTER — HOSPITAL ENCOUNTER (OUTPATIENT)
Facility: HOSPITAL | Age: 64
Discharge: HOME | End: 2019-01-02
Attending: INTERNAL MEDICINE | Admitting: INTERNAL MEDICINE
Payer: MEDICARE

## 2019-01-02 ENCOUNTER — ANESTHESIA EVENT (OUTPATIENT)
Dept: ENDOSCOPY | Facility: HOSPITAL | Age: 64
End: 2019-01-02
Payer: MEDICARE

## 2019-01-02 ENCOUNTER — ANESTHESIA (OUTPATIENT)
Dept: ENDOSCOPY | Facility: HOSPITAL | Age: 64
End: 2019-01-02
Payer: MEDICARE

## 2019-01-02 VITALS
BODY MASS INDEX: 39.27 KG/M2 | WEIGHT: 230 LBS | RESPIRATION RATE: 14 BRPM | DIASTOLIC BLOOD PRESSURE: 67 MMHG | OXYGEN SATURATION: 100 % | SYSTOLIC BLOOD PRESSURE: 140 MMHG | TEMPERATURE: 97 F | HEART RATE: 66 BPM | HEIGHT: 64 IN

## 2019-01-02 DIAGNOSIS — K21.9 GERD (GASTROESOPHAGEAL REFLUX DISEASE): ICD-10-CM

## 2019-01-02 LAB — H PYLORI INDEX VALUE: NEGATIVE

## 2019-01-02 PROCEDURE — 88305 TISSUE SPECIMEN TO PATHOLOGY - SURGERY: ICD-10-PCS | Mod: 26,,, | Performed by: PATHOLOGY

## 2019-01-02 PROCEDURE — 43239 EGD BIOPSY SINGLE/MULTIPLE: CPT | Mod: ,,, | Performed by: INTERNAL MEDICINE

## 2019-01-02 PROCEDURE — D9220A PRA ANESTHESIA: ICD-10-PCS | Mod: CRNA,,, | Performed by: NURSE ANESTHETIST, CERTIFIED REGISTERED

## 2019-01-02 PROCEDURE — 27201012 HC FORCEPS, HOT/COLD, DISP: Mod: PO | Performed by: INTERNAL MEDICINE

## 2019-01-02 PROCEDURE — 25000003 PHARM REV CODE 250: Mod: PO | Performed by: INTERNAL MEDICINE

## 2019-01-02 PROCEDURE — D9220A PRA ANESTHESIA: Mod: ANES,,, | Performed by: ANESTHESIOLOGY

## 2019-01-02 PROCEDURE — 43239 EGD BIOPSY SINGLE/MULTIPLE: CPT | Mod: PO | Performed by: INTERNAL MEDICINE

## 2019-01-02 PROCEDURE — 87449 NOS EACH ORGANISM AG IA: CPT | Mod: PO | Performed by: INTERNAL MEDICINE

## 2019-01-02 PROCEDURE — 37000008 HC ANESTHESIA 1ST 15 MINUTES: Mod: PO | Performed by: INTERNAL MEDICINE

## 2019-01-02 PROCEDURE — D9220A PRA ANESTHESIA: ICD-10-PCS | Mod: ANES,,, | Performed by: ANESTHESIOLOGY

## 2019-01-02 PROCEDURE — D9220A PRA ANESTHESIA: Mod: CRNA,,, | Performed by: NURSE ANESTHETIST, CERTIFIED REGISTERED

## 2019-01-02 PROCEDURE — 43239 PR EGD, FLEX, W/BIOPSY, SGL/MULTI: ICD-10-PCS | Mod: ,,, | Performed by: INTERNAL MEDICINE

## 2019-01-02 PROCEDURE — 88305 TISSUE EXAM BY PATHOLOGIST: CPT | Mod: 26,,, | Performed by: PATHOLOGY

## 2019-01-02 PROCEDURE — 88305 TISSUE EXAM BY PATHOLOGIST: CPT | Performed by: PATHOLOGY

## 2019-01-02 PROCEDURE — 63600175 PHARM REV CODE 636 W HCPCS: Mod: PO | Performed by: NURSE ANESTHETIST, CERTIFIED REGISTERED

## 2019-01-02 PROCEDURE — 37000009 HC ANESTHESIA EA ADD 15 MINS: Mod: PO | Performed by: INTERNAL MEDICINE

## 2019-01-02 PROCEDURE — 25000003 PHARM REV CODE 250: Mod: PO | Performed by: NURSE ANESTHETIST, CERTIFIED REGISTERED

## 2019-01-02 RX ORDER — SODIUM CHLORIDE 0.9 % (FLUSH) 0.9 %
3 SYRINGE (ML) INJECTION
Status: DISCONTINUED | OUTPATIENT
Start: 2019-01-02 | End: 2019-01-02 | Stop reason: HOSPADM

## 2019-01-02 RX ORDER — FENTANYL CITRATE 50 UG/ML
INJECTION, SOLUTION INTRAMUSCULAR; INTRAVENOUS
Status: DISCONTINUED | OUTPATIENT
Start: 2019-01-02 | End: 2019-01-02

## 2019-01-02 RX ORDER — PROPOFOL 10 MG/ML
VIAL (ML) INTRAVENOUS
Status: DISCONTINUED | OUTPATIENT
Start: 2019-01-02 | End: 2019-01-02

## 2019-01-02 RX ORDER — SODIUM CHLORIDE, SODIUM LACTATE, POTASSIUM CHLORIDE, CALCIUM CHLORIDE 600; 310; 30; 20 MG/100ML; MG/100ML; MG/100ML; MG/100ML
INJECTION, SOLUTION INTRAVENOUS CONTINUOUS
Status: DISCONTINUED | OUTPATIENT
Start: 2019-01-02 | End: 2019-01-02 | Stop reason: HOSPADM

## 2019-01-02 RX ORDER — LIDOCAINE HCL/PF 100 MG/5ML
SYRINGE (ML) INTRAVENOUS
Status: DISCONTINUED | OUTPATIENT
Start: 2019-01-02 | End: 2019-01-02

## 2019-01-02 RX ORDER — GLYCOPYRROLATE 0.2 MG/ML
INJECTION INTRAMUSCULAR; INTRAVENOUS
Status: DISCONTINUED | OUTPATIENT
Start: 2019-01-02 | End: 2019-01-02

## 2019-01-02 RX ADMIN — PROPOFOL 100 MG: 10 INJECTION, EMULSION INTRAVENOUS at 10:01

## 2019-01-02 RX ADMIN — PROPOFOL 50 MG: 10 INJECTION, EMULSION INTRAVENOUS at 11:01

## 2019-01-02 RX ADMIN — LIDOCAINE HYDROCHLORIDE 100 MG: 20 INJECTION, SOLUTION INTRAVENOUS at 10:01

## 2019-01-02 RX ADMIN — FENTANYL CITRATE 25 MCG: 50 INJECTION, SOLUTION INTRAMUSCULAR; INTRAVENOUS at 10:01

## 2019-01-02 RX ADMIN — GLYCOPYRROLATE 0.2 MG: 0.2 INJECTION, SOLUTION INTRAMUSCULAR; INTRAVENOUS at 10:01

## 2019-01-02 RX ADMIN — SODIUM CHLORIDE, SODIUM LACTATE, POTASSIUM CHLORIDE, AND CALCIUM CHLORIDE: .6; .31; .03; .02 INJECTION, SOLUTION INTRAVENOUS at 10:01

## 2019-01-02 NOTE — ANESTHESIA POSTPROCEDURE EVALUATION
"Anesthesia Post Evaluation    Patient: Vida Ornelas    Procedure(s) Performed: Procedure(s) (LRB):  EGD (ESOPHAGOGASTRODUODENOSCOPY) (N/A)    Final Anesthesia Type: general  Patient location during evaluation: PACU  Patient participation: Yes- Able to Participate  Level of consciousness: awake and alert  Post-procedure vital signs: reviewed and stable  Pain management: adequate  Airway patency: patent  PONV status at discharge: No PONV  Anesthetic complications: no      Cardiovascular status: hemodynamically stable and blood pressure returned to baseline  Respiratory status: unassisted, spontaneous ventilation and room air  Hydration status: euvolemic  Follow-up not needed.        Visit Vitals  BP (!) 140/67   Pulse 66   Temp 36.2 °C (97.2 °F) (Skin)   Resp 14   Ht 5' 4" (1.626 m)   Wt 104.3 kg (230 lb)   SpO2 100%   Breastfeeding? No   BMI 39.48 kg/m²       Pain/Philippe Score: No Data Recorded      "

## 2019-01-02 NOTE — ANESTHESIA PREPROCEDURE EVALUATION
01/02/2019  Vida Ornelas is a 63 y.o., female.    Anesthesia Evaluation      I have reviewed the Medications.     Review of Systems  Anesthesia Hx:  No problems with previous Anesthesia   Social:  Non-Smoker, No Alcohol Use    Cardiovascular:   Hypertension hyperlipidemia    Pulmonary:  Pulmonary Normal    Renal/:  Renal/ Normal     Hepatic/GI:   GERD    Musculoskeletal:   Arthritis     Neurological:  Neurology Normal    Endocrine:   Hypothyroidism        Physical Exam  General:  Obesity    Airway/Jaw/Neck:  Airway Findings: Mouth Opening: Normal Tongue: Normal  General Airway Assessment: Adult, Average  Mallampati: II  Jaw/Neck Findings:  Neck ROM: Normal ROM       Chest/Lungs:  Chest/Lungs Findings: Clear to auscultation, Normal Respiratory Rate     Heart/Vascular:  Heart Findings: Rate: Normal  Rhythm: Regular Rhythm  Sounds: Normal  Heart murmur: negative       Mental Status:  Mental Status Findings:  Cooperative, Alert and Oriented         Anesthesia Plan  Type of Anesthesia, risks & benefits discussed:  Anesthesia Type:  general  Patient's Preference:   Intra-op Monitoring Plan:   Intra-op Monitoring Plan Comments:   Post Op Pain Control Plan:   Post Op Pain Control Plan Comments:   Induction:   IV  Beta Blocker:  Patient is not currently on a Beta-Blocker (No further documentation required).       Informed Consent: Patient understands risks and agrees with Anesthesia plan.  Questions answered. Anesthesia consent signed with patient.  ASA Score: 2     Day of Surgery Review of History & Physical:        Anesthesia Plan Notes: Propofol general        Ready For Surgery From Anesthesia Perspective.

## 2019-01-02 NOTE — TELEPHONE ENCOUNTER
----- Message from Rehan Whitman sent at 1/2/2019  2:58 PM CST -----  Contact: [facility], [place] Person want to speak with a nurse regarding voltaran gel need clarification please call back at     [facility] - [place] - [address]  [address]  [place] [address]  Phone: [phone] Fax: [phone]

## 2019-01-02 NOTE — DISCHARGE INSTRUCTIONS
Recovery After Procedural Sedation (Adult)  You have been given medicine by vein to make you sleep during your surgery. This may have included both a pain medicine and sleeping medicine. Most of the effects have worn off. But you may still have some drowsiness for the next 6 to 8 hours.  Home care  Follow these guidelines when you get home:  · For the next 8 hours, you should be watched by a responsible adult. This person should make sure your condition is not getting worse.  · Don't drink any alcohol for the next 24 hours.  · Don't drive, operate dangerous machinery, or make important business or personal decisions during the next 24 hours.  Note: Your healthcare provider may tell you not to take any medicine by mouth for pain or sleep in the next 4 hours. These medicines may react with the medicines you were given in the hospital. This could cause a much stronger response than usual.  Follow-up care  Follow up with your healthcare provider if you are not alert and back to your usual level of activity within 12 hours.  When to seek medical advice  Call your healthcare provider right away if any of these occur:  · Drowsiness gets worse  · Weakness or dizziness gets worse  · Repeated vomiting  · You can't be awakened   Date Last Reviewed: 10/18/2016  © 5980-2319 [employer]. [address] [place] [address], [place] [address]. All rights reserved. This information is not intended as a substitute for professional medical care. Always follow your healthcare professional's instructions.        Tips to Control Acid Reflux    To control acid reflux, youll need to make some basic diet and lifestyle changes. The simple steps outlined below may be all youll need to ease discomfort.  Watch what you eat  · Avoid fatty foods and spicy foods.  · Eat fewer acidic foods, such as citrus and tomato-based foods. These can increase symptoms.  · Limit drinking alcohol, caffeine, and fizzy beverages. All increase acid  reflux.  · Try limiting chocolate, peppermint, and spearmint. These can worsen acid reflux in some people.  Watch when you eat  · Avoid lying down for 3 hours after eating.  · Do not snack before going to bed.  Raise your head  Raising your head and upper body by 4 to 6 inches helps limit reflux when youre lying down. Put blocks under the head of your bed frame to raise it.  Other changes  · Lose weight, if you need to  · Dont exercise near bedtime  · Avoid tight-fitting clothes  · Limit aspirin and ibuprofen  · Stop smoking   Date Last Reviewed: 7/1/2016  © 3230-6246 [employer] [address] [place] [address], [place] [address]. All rights reserved. This information is not intended as a substitute for professional medical care. Always follow your healthcare professional's instructions.

## 2019-01-02 NOTE — H&P
History & Physical - Short Stay  Gastroenterology      SUBJECTIVE:     Procedure: Gastroscopy    Chief Complaint/Indication for Procedure:  Dyspepsia. GERD.  Heme pos stool.    History of Present Illness:  Office Visit     12/14/2018  [place] [facility] G[facility]      PEGG[facility]   Gastroenterology   Gastroesophageal reflux disease with esophagitis +5 more   Dx   Follow-up   ; Referred by Jemima Parr NP   Reason for Visit    Progress Notes        Subjective:       Patient ID: Vida Ornelas is a 63 y.o. female Body mass index is 39.75 kg/m².     Chief Complaint: Follow-up (stool specimen with blood )     Established patient of Dr. Davis & myself.     Gastroesophageal Reflux   She complains of abdominal pain (started with burning to stomach ~early 11/2018; epigastric and periumbilical area; improving; reports was not taking prilosec when symptoms occurred and her arthritis flared up with the cold weather; worse with eating) and heartburn (stomach burns but denies GERD). She reports no chest pain, no choking, no coughing, no dysphagia, no globus sensation, no hoarse voice, no nausea, no sore throat or no water brash. This is a chronic problem. Episode onset: several years ago. The problem has been gradually improving (since she started acid reducing medications). The heartburn does not wake her from sleep. The symptoms are aggravated by stress and certain foods (red sauce, spicy foods, peanut, sweets). Associated symptoms include melena (black stool for the past month). Pertinent negatives include no fatigue or weight loss (trying to lose weight with dieting). Risk factors include caffeine use, obesity, NSAIDs and smoking/tobacco exposure (mobic PRN which is rare & celebrex BID for arthritis; former smoker). She has tried a PPI and a histamine-2 antagonist (dexilant 60 mg once daily; carafate 1 gram qid; zantac 300 mg nightly; PAST: omeprazole ) for the symptoms. The treatment provided moderate  "relief. Past procedures include an abdominal ultrasound, an EGD and a UGI.     ROS:  Gastrointestinal: Positive for abdominal pain (started with burning to stomach ~early 11/2018; epigastric and periumbilical area; improving; reports was not taking prilosec when symptoms occurred and her arthritis flared up with the cold weather; worse with eating), heartburn (stomach burns but denies GERD) and melena (black stool for the past month). Negative for abdominal distention, anal bleeding, constipation, diarrhea, dysphagia, nausea, rectal pain and vomiting.     10/3/17 EGD was reviewed and procedure report states:   " Findings:       The oropharynx was normal.       The proximal esophagus and mid esophagus were normal.       Slight reflux esophagitis was seen in the distal esophagus. Biopsies        were taken with a cold forceps for histology.       The Z-line was regular and was found 36 cm from the incisors.       Some laxness of the lower esophageal sphincter was noted, but I did        not see a definite hiatal hernia.       Minimal inflammation characterized by erythema was found in the        prepyloric region of the stomach. Biopsies were taken with a cold        forceps for Helicobacter pylori testing using CLOtest. Biopsies were        taken with a cold forceps for histology.       The stomach was otherwise normal.       The examined duodenum was normal.       No endoscopic abnormality was evident in the esophagus to explain        the patient's complaint of dysphagia. It was decided, however, to        proceed with dilation of the entire esophagus. A guidewire was        placed and the scope was withdrawn. Dilation was performed with a        Savary dilator with no resistance at 54 Fr.  Impression:  - Normal oropharynx.                       - Normal proximal esophagus and mid esophagus.                       - Minimal reflux esophagitis. Biopsied.                       - Z-line regular, 36 cm from the " "incisors.                       - Minimal antritis. Biopsied.                       - Normal stomach otherwise.                       - Normal examined duodenum.                       - No endoscopic esophageal abnormality to explain                        patient's dysphagia. Esophagus dilated, 54 Fr.  Recommendation:      - Discharge patient to home.                       - Await pathology results.                       - Follow an antireflux regimen.                       - Continue present medications.                       - Use Prilosec (omeprazole) 40 mg PO daily.                       - Use Zantac (ranitidine) 300 mg PO daily.                       - Use sucralfate tablets 1 gram PO QID.                       - Return to GI clinic in 6-8 weeks. ".  Biopsy results:    jaclyn test negative  "Supplemental Diagnosis  2. Stomach, biopsy:  - Negative for Helicobacter pylori by specific immunohistochemical stain. Immunohistochemical stains have  satisfactory positive and negative controls.  (Electronically Signed: 2017-10-06 13:57:14 )  Diagnosed by: Eric Contreras M.D.  FINAL PATHOLOGIC DIAGNOSIS  1. Esophagus, distal, biopsy:  Benign esophageal squamous mucosa with changes compatible with reflux. There is no acute inflammation,  increase in intraepithelial eosinophils, or viral cytopathic change.  2. Stomach, biopsy:  Reactive gastropathy. There is no significant inflammation and no intestinal metaplasia or epithelial dysplasia  present. Negative for Helicobacter pylori by H&E stain."    Assessment:       1. Gastroesophageal reflux disease with esophagitis    2. Black stool    3. Occult blood positive stool    4. Encounter for monitoring long-term proton pump inhibitor therapy    5. History of peptic ulcer disease    6. NSAID long-term use        Plan:       Gastroesophageal reflux disease with esophagitis  -  REFILL   sucralfate (CARAFATE) 1 gram tablet; Take 1 tablet (1 g total) by mouth 4 (four) times daily before " meals and nightly.  Dispense: 120 tablet; Refill: 0  - CONTINUE DEXILANT 60 MG ONCE DAILY AS DIRECTED,  take in the morning before breakfast, discussed about possible long term use of medication, pt verbalized understanding and wants to continue current medications at current dosage  - CONTINUE ZANTAC 300 MG NIGHTLY AS DIRECTED  -discussed the different types of medications used to treat reflux and how to use them, antacids can be used PRN for breakthrough heartburn symptoms by reducing stomach acid that is already produced, H2 blockers (zantac) work by limiting the amount acid production, & PPI's work to block acid production and are taken daily, patient verbalized understanding.  - continue zantac 300 mg once daily as directed  - continue lifestyle modifications to help control reflux including: avoid large meals, avoid eating within 2-3 hours of bedtime (avoid late night eating & lying down soon after eating), elevate head of bed if nocturnal symptoms are present, & weight loss.   - avoid known foods which trigger reflux symptoms & to minimize/avoid high-fat foods, chocolate, caffeine, citrus, alcohol, & tomato products.  - avoid/limit use of NSAID's, since they can cause GI upset, bleeding, and/or ulcers. If needed, take with food.     Black stool & Occult blood positive stool  -   REFILL  sucralfate (CARAFATE) 1 gram tablet; Take 1 tablet (1 g total) by mouth 4 (four) times daily before meals and nightly.  Dispense: 120 tablet; Refill: 0  - schedule EGD, discussed procedure with patient, patient verbalized understanding  - Possible COLONOSCOPY pending results of testing and if symptoms persist     Encounter for monitoring long-term proton pump inhibitor therapy  -     Vitamin B12; Future; Expected date: 12/27/2018  -     Magnesium; Future; Expected date: 12/27/2018  - discussed with patient about long term use of reflux medications (preference to use lowest effective dose or discontinuing if possible), the risk  and benefits of using these medications long term, the risk of untreated GERD such as martinez's esophagus, and recommend a diet high in calcium and/or taking OTC calcium and vitamin d supplements as directed (such as Citracal +D), pt verbalized understanding & patient wants to continue current medications at current dosages for now.  - recommend annual monitoring with blood work to include CMP, CBC, vitamin B12, and magnesium.     History of peptic ulcer disease & NSAID long-term use  -   REFILL  sucralfate (CARAFATE) 1 gram tablet; Take 1 tablet (1 g total) by mouth 4 (four) times daily before meals and nightly.  Dispense: 120 tablet; Refill: 0  - schedule EGD, discussed procedure with patient, patient verbalized understanding  - avoid/minimize use of NSAIDs- since they can cause GI upset, bleeding and/or ulcers. If NSAID must be taken, recommend take with food.     Follow-up in about 1 month (around 1/14/2019), or if symptoms worsen or fail to improve.                 PTA Medications   Medication Sig    azelastine (ASTELIN) 137 mcg (0.1 %) nasal spray 1 spray (137 mcg total) by Nasal route 2 (two) times daily.    celecoxib (CELEBREX) 200 MG capsule Take 1 capsule (200 mg total) by mouth 2 (two) times daily.    dexlansoprazole (DEXILANT) 60 mg capsule Take 1 capsule (60 mg total) by mouth once daily.    hydrocodone-acetaminophen 10-325mg (NORCO)  mg Tab Take 1 tablet by mouth.    leflunomide (ARAVA) 20 MG Tab Take 1 tablet (20 mg total) by mouth once daily.    levocetirizine (XYZAL) 5 MG tablet Take 1 tablet (5 mg total) by mouth every evening.    levothyroxine (SYNTHROID) 25 MCG tablet TAKE ONE TABLET BY MOUTH EVERY MORNING before breakfast and hold all food and meds for 30 minutes after taking    lidocaine HCL 2% (XYLOCAINE) 2 % jelly Apply ONE a small amount to affected area three times a day as needed    losartan (COZAAR) 25 MG tablet Take 1 tablet (25 mg total) by mouth once daily.    meloxicam  (MOBIC) 7.5 MG tablet TAKE ONE TABLET BY MOUTH ONCE DAILY WITH FOOD prn    multivitamin (ONE DAILY MULTIVITAMIN) per tablet Take 1 tablet by mouth once daily.    pravastatin (PRAVACHOL) 20 MG tablet Take 1 tablet (20 mg total) by mouth once daily.    ranitidine (ZANTAC) 300 MG tablet Take 1 tablet (300 mg total) by mouth every evening.    sucralfate (CARAFATE) 1 gram tablet Take 1 tablet (1 g total) by mouth 4 (four) times daily before meals and nightly.    VOLTAREN 1 % Gel Apply topically 4 (four) times daily.       Review of patient's allergies indicates:   Allergen Reactions    Ibuprofen Diarrhea     Stomach pains  States can take    Nifedipine      Pt does not remember reaction    Plaquenil [hydroxychloroquine] Diarrhea    Tacrolimus      Pt does not remember reaction    Penicillins Itching        Past Medical History:   Diagnosis Date    Arthritis     Seronegative rheumatoid arthritis    Back pain     s/p ena     Cataract     Colon polyp     ESR raised     Former smoker     GERD with stricture     Hyperlipidemia     Hypertension     Hypothyroidism     Irritable bowel syndrome     Peptic ulcer disease     RA (rheumatoid arthritis)     Vitamin D deficiency      Past Surgical History:   Procedure Laterality Date    COLONOSCOPY  6/15/2015    Dr. lepe: colonic spasm consistent with IBS, otherwise normal findings; repeat in 5 years for surveillance due to family history of colon cancer in father    COLONOSCOPY N/A 6/15/2015    Performed by Juan David Lepe Jr., MD at [facility]    COLONOSCOPY W/ POLYPECTOMY  2010? 2011?  (Stevie)    Benign polyps removed.    ESOPHAGOGASTRODUODENOSCOPY  early 2000s??    ESOPHAGOGASTRODUODENOSCOPY (EGD) N/A 10/3/2017    Performed by Juan David Lepe Jr., MD at [facility]    ESOPHAGOGASTRODUODENOSCOPY (EGD) N/A 4/15/2016    Performed by Juan David Lepe Jr., MD at [facility]    ESOPHAGOGASTRODUODENOSCOPY (EGD) N/A 5/13/2015    Performed by Juan David Lepe  "MD [facility] at [facility]    ESOPHAGOSCOPY W/ DILATION  2015  Ryan    Non-erosive esophageal reflux (NERD) disease?.  Dysphagia, esophageal spasm?   Esophagus dilated, 51 Fr.  Antral erythema.  DAYNA Test  Negative.    HYSTERECTOMY      JOINT REPLACEMENT      right and left hip  r-5yrs  l-1yr ago    SMALL BOWEL ENDOSCOPY-UPPER N/A 4/15/2016    Performed by Juan David Davis Jr., MD at [facility]    TOTAL HIP ARTHROPLASTY Right 2015    UPPER GASTROINTESTINAL ENDOSCOPY  2015    UPPER GASTROINTESTINAL ENDOSCOPY      dilation    UPPER GASTROINTESTINAL ENDOSCOPY  04/15/2016    Dr. Davis    UPPER GASTROINTESTINAL ENDOSCOPY  10/03/2017    Dr. Davis     Family History   Problem Relation Age of Onset    Rheum arthritis Mother     Arthritis Mother         rheumatoid arthiritis    Lupus Sister     Hypertension Sister     Arthritis Sister         rheumatoid arthritis    Macular degeneration Sister     Cancer Father         colon CA    Arthritis Father         rheumatoid arthritis    Colon cancer Father         unsure of age of diagnosis,  at 85 years old    Cataracts Father     Breast cancer Neg Hx     Crohn's disease Neg Hx     Ulcerative colitis Neg Hx     Stomach cancer Neg Hx     Esophageal cancer Neg Hx     Amblyopia Neg Hx     Blindness Neg Hx     Diabetes Neg Hx     Glaucoma Neg Hx     Retinal detachment Neg Hx     Strabismus Neg Hx     Stroke Neg Hx     Thyroid disease Neg Hx      Social History     Tobacco Use    Smoking status: Former Smoker     Last attempt to quit: 1975     Years since quittin.5    Smokeless tobacco: Never Used   Substance Use Topics    Alcohol use: No     Alcohol/week: 0.0 oz    Drug use: No         OBJECTIVE:     Vital Signs (Most Recent)  Temp: 97.3 °F (36.3 °C) (19 1009)  Pulse: 65 (19 1009)  Resp: 19 (19 1009)  BP: 137/61 (19 1009)  SpO2: 100 % (19 1009)    Physical Exam:  : Ht 5' 4" (1.626 m)   Wt 105.1 kg (231 " lb 9.6 oz)   BMI 39.75 kg/m²                                                       GENERAL:  Comfortable, in no acute distress.                        HEENT EXAM:  Nonicteric.  No adenopathy.  Oropharynx is clear.               NECK:  Supple.                                                               LUNGS:  Clear.                                                               CARDIAC:  Regular rate and rhythm.  S1, S2.  No murmur.    ABDOMEN:  Soft, positive bowel sounds, nontender.  No hepatosplenomegaly or masses.  No rebound or guarding.     EXTREMITIES:  No edema.     MENTAL STATUS:  Alert and oriented.    ASSESSMENT/PLAN:     Assessment: Dyspepsia. GERD.  Heme pos stool.    Plan: Gastroscopy    Anesthesia Plan:   MAC / General Anaesthesia    ASA Grade: ASA 2 - Patient with mild systemic disease with no functional limitations    MALLAMPATI SCORE: II (hard and soft palate, upper portion of tonsils anduvula visible)

## 2019-01-02 NOTE — [employer] PATIENT INSTRUCTIONS
Discharge Summary/Instructions after an Endoscopic Procedure  Patient Name: Vida Ornelas  Patient MRN: [id number]  Patient YOB: 1955  Wednesday, January 02, 2019  Juan David Davis MD  RESTRICTIONS:  During your procedure today, you received medications for sedation.  These   medications may affect your judgment, balance and coordination.  Therefore,   for 24 hours, you have the following restrictions:   - DO NOT drive a car, operate machinery, make legal/financial decisions,   sign important papers or drink alcohol.    ACTIVITY:  Today: no heavy lifting, straining or running due to procedural   sedation/anesthesia.  The following day: return to full activity including work.  DIET:  Eat and drink normally unless instructed otherwise.     TREATMENT FOR COMMON SIDE EFFECTS:  - Mild abdominal pain, nausea, belching, bloating or excessive gas:  rest,   eat lightly and use a heating pad.  - Sore Throat: treat with throat lozenges and/or gargle with warm salt   water.  - Because air was used during the procedure, expelling large amounts of air   from your rectum or belching is normal.  - If a bowel prep was taken, you may not have a bowel movement for 1-3 days.    This is normal.  SYMPTOMS TO WATCH FOR AND REPORT TO YOUR PHYSICIAN:  1. Abdominal pain or bloating, other than gas cramps.  2. Chest pain.  3. Back pain.  4. Signs of infection such as: chills or fever occurring within 24 hours   after the procedure.  5. Rectal bleeding, which would show as bright red, maroon, or black stools.   (A tablespoon of blood from the rectum is not serious, especially if   hemorrhoids are present.)  6. Vomiting.  7. Weakness or dizziness.  GO DIRECTLY TO THE NEAREST EMERGENCY ROOM IF YOU HAVE ANY OF THE FOLLOWING:      Difficulty breathing              Chills and/or fever over 101 F   Persistent vomiting and/or vomiting blood   Severe abdominal pain   Severe chest pain   Black, tarry stools   Bleeding- more than one  tablespoon   Any other symptom or condition that you feel may need urgent attention  Your doctor recommends these additional instructions:  If any biopsies were taken, your doctors clinic will contact you in 1 to 2   weeks with any results.  Follow an antireflux regimen.  This includes:       - Do not lie down for at least 3 to 4 hours after meals.        - Raise the head of the bed 4 to 6 inches.        - Decrease excess weight.        - Avoid citrus juices and other acidic foods, alcohol, chocolate, mints,   coffee and other caffeinated beverages, carbonated beverages, fatty and   fried foods.        - Avoid tight-fitting clothing.        - Avoid cigarettes and other tobacco products.   Continue your present medications.   Take Dexilant (dexlansoprazole) 60 mg by mouth once a day.   Take Zantac (ranitidine) 300 mg by mouth once a day.   Take Carafate (sucralfate) tablets 1 gram by mouth four times a day.   Return to GI clinic in 4-6 weeks.   For questions, problems or results please call your physician - Juan David Davis MD at Work:  [phone].  EMERGENCY PHONE NUMBER: [phone], LAB RESULTS: [phone]  IF A COMPLICATION OR EMERGENCY SITUATION ARISES AND YOU ARE UNABLE TO REACH   YOUR PHYSICIAN - GO DIRECTLY TO THE EMERGENCY ROOM.  ___________________________________________  Nurse Signature  ___________________________________________  Patient/Designated Responsible Party Signature  Juan David Davis MD  1/2/2019 11:25:05 AM  This report has been verified and signed electronically.  [employer]

## 2019-01-02 NOTE — TRANSFER OF CARE
"Anesthesia Transfer of Care Note    Patient: Vida Ornelas    Procedure(s) Performed: Procedure(s) (LRB):  EGD (ESOPHAGOGASTRODUODENOSCOPY) (N/A)    Patient location: PACU    Anesthesia Type: general    Transport from OR: Transported from OR on room air with adequate spontaneous ventilation    Post pain: adequate analgesia    Post assessment: no apparent anesthetic complications and tolerated procedure well    Post vital signs: stable    Level of consciousness: sedated    Nausea/Vomiting: no nausea/vomiting    Complications: none    Transfer of care protocol was followed      Last vitals:   Visit Vitals  /80   Pulse 72   Temp 36.2 °C (97.2 °F) (Skin)   Resp 18   Ht 5' 4" (1.626 m)   Wt 104.3 kg (230 lb)   SpO2 98%   Breastfeeding? No   BMI 39.48 kg/m²     "

## 2019-01-02 NOTE — BRIEF OP NOTE
Discharge Note  Short Stay      SUMMARY     Admit Date: 1/2/2019    Attending Physician: Juan David Davis Jr., MD     Discharge Physician: Juan David Davis Jr., MD    Discharge Date: 1/2/2019 11:27 AM    Final Diagnosis: Gastroesophageal reflux disease, esophagitis presence not specified [K21.9]  NSAID long-term use [Z79.1]  Occult blood in stools [R19.5]    Impression:  - Normal oropharynx.                       - Normal esophagus.                       - Z-line regular, 36 cm from the incisors.                         - Patulous LES.  NERD?                       - Minimal antritis. Biopsied.                       - Normal stomach otherwise.                       - Normal examined duodenum.  Bx'd to r/o celiac.                      Recommendation:      - Discharge patient to home.                       - Await pathology results.                       - Follow an antireflux regimen.                       - Continue present medications.                       - Use Dexilant 60 mg PO daily.                       - Use Zantac (ranitidine) 300 mg PO nighty.                       - Use sucralfate tablets 1 gram PO QID.                       - Return to GI clinic in 4-6 weeks.  Juan David Davis MD    Disposition: HOME OR SELF CARE    Patient Instructions:   Current Discharge Medication List      CONTINUE these medications which have NOT CHANGED    Details   azelastine (ASTELIN) 137 mcg (0.1 %) nasal spray 1 spray (137 mcg total) by Nasal route 2 (two) times daily.  Qty: 30 mL, Refills: 11    Associated Diagnoses: Rhinitis, unspecified chronicity, unspecified type      celecoxib (CELEBREX) 200 MG capsule Take 1 capsule (200 mg total) by mouth 2 (two) times daily.  Qty: 60 capsule, Refills: 5      dexlansoprazole (DEXILANT) 60 mg capsule Take 1 capsule (60 mg total) by mouth once daily.  Qty: 30 capsule, Refills: 0      hydrocodone-acetaminophen 10-325mg (NORCO)  mg Tab Take 1 tablet by mouth.      leflunomide  (ARAVA) 20 MG Tab Take 1 tablet (20 mg total) by mouth once daily.  Qty: 30 tablet, Refills: 6    Associated Diagnoses: Rheumatoid arthritis of hand, unspecified laterality, unspecified rheumatoid factor presence; Hip pain, left; Osteoarthrosis of hip; Acquired hypothyroidism; ESR raised; Primary osteoarthritis of both hips      levocetirizine (XYZAL) 5 MG tablet Take 1 tablet (5 mg total) by mouth every evening.  Qty: 30 tablet, Refills: 11    Associated Diagnoses: Sinusitis, unspecified chronicity, unspecified location      levothyroxine (SYNTHROID) 25 MCG tablet TAKE ONE TABLET BY MOUTH EVERY MORNING before breakfast and hold all food and meds for 30 minutes after taking  Qty: 90 tablet, Refills: 3    Comments: This prescription was filled on 11/9/2017. Any refills authorized will be placed on file.  Associated Diagnoses: Other specified hypothyroidism      lidocaine HCL 2% (XYLOCAINE) 2 % jelly Apply ONE a small amount to affected area three times a day as needed  Refills: 1      losartan (COZAAR) 25 MG tablet Take 1 tablet (25 mg total) by mouth once daily.  Qty: 90 tablet, Refills: 3      meloxicam (MOBIC) 7.5 MG tablet TAKE ONE TABLET BY MOUTH ONCE DAILY WITH FOOD prn  Refills: 6      multivitamin (ONE DAILY MULTIVITAMIN) per tablet Take 1 tablet by mouth once daily.      pravastatin (PRAVACHOL) 20 MG tablet Take 1 tablet (20 mg total) by mouth once daily.  Qty: 90 tablet, Refills: 3      ranitidine (ZANTAC) 300 MG tablet Take 1 tablet (300 mg total) by mouth every evening.  Qty: 90 tablet, Refills: 3      sucralfate (CARAFATE) 1 gram tablet Take 1 tablet (1 g total) by mouth 4 (four) times daily before meals and nightly.  Qty: 120 tablet, Refills: 0    Associated Diagnoses: Gastroesophageal reflux disease with esophagitis; Occult blood positive stool; History of peptic ulcer disease      VOLTAREN 1 % Gel Apply topically 4 (four) times daily.  Qty: 400 g, Refills: 4    Associated Diagnoses: Rheumatoid  arthritis of hand, unspecified laterality, unspecified rheumatoid factor presence; Hip pain, left; Osteoarthrosis of hip; Acquired hypothyroidism; ESR raised; Primary osteoarthritis of both hips             Discharge Procedure Orders (must include Diet, Follow-up, Activity)    Follow Up:  Follow up with PCP as per your routine.  Please follow an anti reflux diet.  Activity as tolerated.    No driving day of procedure.

## 2019-01-10 DIAGNOSIS — K21.00 GASTROESOPHAGEAL REFLUX DISEASE WITH ESOPHAGITIS: ICD-10-CM

## 2019-01-10 DIAGNOSIS — Z87.11 HISTORY OF PEPTIC ULCER DISEASE: ICD-10-CM

## 2019-01-10 DIAGNOSIS — R19.5 OCCULT BLOOD POSITIVE STOOL: ICD-10-CM

## 2019-01-11 RX ORDER — SUCRALFATE 1 G/1
TABLET ORAL
Qty: 120 TABLET | Refills: 5 | Status: SHIPPED | OUTPATIENT
Start: 2019-01-11 | End: 2019-04-24 | Stop reason: SDUPTHER

## 2019-01-18 ENCOUNTER — OFFICE VISIT (OUTPATIENT)
Dept: FAMILY MEDICINE | Facility: CLINIC | Age: 64
End: 2019-01-18
Payer: MEDICARE

## 2019-01-18 VITALS
SYSTOLIC BLOOD PRESSURE: 138 MMHG | OXYGEN SATURATION: 99 % | BODY MASS INDEX: 39.67 KG/M2 | WEIGHT: 232.38 LBS | HEART RATE: 69 BPM | DIASTOLIC BLOOD PRESSURE: 82 MMHG | HEIGHT: 64 IN

## 2019-01-18 DIAGNOSIS — E66.1 CLASS 2 DRUG-INDUCED OBESITY WITH SERIOUS COMORBIDITY AND BODY MASS INDEX (BMI) OF 39.0 TO 39.9 IN ADULT: ICD-10-CM

## 2019-01-18 DIAGNOSIS — M06.09 RHEUMATOID ARTHRITIS OF MULTIPLE SITES WITH NEGATIVE RHEUMATOID FACTOR: ICD-10-CM

## 2019-01-18 DIAGNOSIS — E78.49 OTHER HYPERLIPIDEMIA: ICD-10-CM

## 2019-01-18 DIAGNOSIS — R53.83 OTHER FATIGUE: ICD-10-CM

## 2019-01-18 DIAGNOSIS — I10 ESSENTIAL HYPERTENSION: Primary | ICD-10-CM

## 2019-01-18 PROCEDURE — 99499 UNLISTED E&M SERVICE: CPT | Mod: HCNC,S$GLB,, | Performed by: FAMILY MEDICINE

## 2019-01-18 PROCEDURE — 3079F PR MOST RECENT DIASTOLIC BLOOD PRESSURE 80-89 MM HG: ICD-10-PCS | Mod: CPTII,S$GLB,, | Performed by: FAMILY MEDICINE

## 2019-01-18 PROCEDURE — 3075F SYST BP GE 130 - 139MM HG: CPT | Mod: CPTII,S$GLB,, | Performed by: FAMILY MEDICINE

## 2019-01-18 PROCEDURE — 3075F PR MOST RECENT SYSTOLIC BLOOD PRESS GE 130-139MM HG: ICD-10-PCS | Mod: CPTII,S$GLB,, | Performed by: FAMILY MEDICINE

## 2019-01-18 PROCEDURE — 3008F BODY MASS INDEX DOCD: CPT | Mod: CPTII,S$GLB,, | Performed by: FAMILY MEDICINE

## 2019-01-18 PROCEDURE — 99499 RISK ADDL DX/OHS AUDIT: ICD-10-PCS | Mod: HCNC,S$GLB,, | Performed by: FAMILY MEDICINE

## 2019-01-18 PROCEDURE — 99214 OFFICE O/P EST MOD 30 MIN: CPT | Mod: S$GLB,,, | Performed by: FAMILY MEDICINE

## 2019-01-18 PROCEDURE — 99214 PR OFFICE/OUTPT VISIT, EST, LEVL IV, 30-39 MIN: ICD-10-PCS | Mod: S$GLB,,, | Performed by: FAMILY MEDICINE

## 2019-01-18 PROCEDURE — 3008F PR BODY MASS INDEX (BMI) DOCUMENTED: ICD-10-PCS | Mod: CPTII,S$GLB,, | Performed by: FAMILY MEDICINE

## 2019-01-18 PROCEDURE — 99999 PR PBB SHADOW E&M-EST. PATIENT-LVL III: ICD-10-PCS | Mod: PBBFAC,,, | Performed by: FAMILY MEDICINE

## 2019-01-18 PROCEDURE — 99999 PR PBB SHADOW E&M-EST. PATIENT-LVL III: CPT | Mod: PBBFAC,,, | Performed by: FAMILY MEDICINE

## 2019-01-18 PROCEDURE — 3079F DIAST BP 80-89 MM HG: CPT | Mod: CPTII,S$GLB,, | Performed by: FAMILY MEDICINE

## 2019-01-18 NOTE — PROGRESS NOTES
Subjective:       Patient ID: Vida Ornelas is a 63 y.o. female.    Chief Complaint: Follow-up on hypertension    HPI   The patient is coming here today for follow-up visit on hypertension, the patient has been taking her blood pressure medication as directed, she denies any side effects of the medication, she has been trying to eat healthier, but for the last couple weeks she has been feeling weak, fatigued, she was having worsening pain on the left hip and left leg, she went to see the orthopedic specialist and was told that 1 of the screws were lose and she had an infection and she needed to have surgery as soon as possible.  The patient is scheduled to have surgery next week.  She denies any redness, swelling on the area but she has moderate to severe pain. The patient also complains of worsening pain on the lower back, the patient currently seen the rheumatologist secondary to rheumatoid arthritis, she had a blood work done which sedimentation rate was elevated, rheumatoid factor was negative and anti CCP was negative.    She also is coming here today for a follow-up on as reflux, went to see the gastroenterologist and was told that she had a non erosive gastroesophageal reflux disease, she was placed of Dexilant, ranitidine and also sucralfate, the patient stated the symptoms are improved considerably and she is feeling better in that regard.    The patient stated that the fatigue is getting worse, she has no able to walk due to the pain as much as before.  She has been gaining weight, she states that is difficult to lose weight, she is taking multiple medications that can cause weight issues for her.    The patient also is taking cholesterol medication as directed, denies any side effects of medication, her last LDL was elevated, the last time was check was in 2017.  She is coming here for assessment.    Past medical history, past social history was reviewed and discussed with the patient.  Review of  Systems   Constitutional: Positive for activity change and fatigue.   Respiratory: Negative for cough and stridor.    Cardiovascular: Negative for chest pain and palpitations.   Musculoskeletal: Positive for arthralgias and back pain.       Objective:      Physical Exam   Constitutional: She appears well-developed and well-nourished. No distress.   Limited ambulation   HENT:   Head: Normocephalic and atraumatic.   Right Ear: External ear normal.   Left Ear: External ear normal.   Nose: Nose normal.   Mouth/Throat: Oropharynx is clear and moist. No oropharyngeal exudate.   Eyes: Conjunctivae are normal. Pupils are equal, round, and reactive to light. Right eye exhibits no discharge. Left eye exhibits no discharge.   Neck: Neck supple. No thyromegaly present.   Cardiovascular: Normal rate, regular rhythm and normal heart sounds.   No murmur heard.  Pulmonary/Chest: Effort normal and breath sounds normal. No respiratory distress. She has no wheezes.   Musculoskeletal: She exhibits no tenderness (Left leg) or deformity.   Lymphadenopathy:     She has no cervical adenopathy.   Neurological: No cranial nerve deficit.   Skin: She is not diaphoretic. No erythema. No pallor.   Psychiatric: She has a normal mood and affect. Her behavior is normal. Judgment and thought content normal.   Nursing note and vitals reviewed.      Assessment:       1. Essential hypertension    2. Other hyperlipidemia    3. Rheumatoid arthritis of multiple sites with negative rheumatoid factor    4. Other fatigue    5. Class 2 drug-induced obesity with serious comorbidity and body mass index (BMI) of 39.0 to 39.9 in adult        Plan:       Essential hypertension:  Well controlled    Other hyperlipidemia:  Uncontrolled  -     Lipid panel; Future; Expected date: 01/18/2019    Rheumatoid arthritis of multiple sites with negative rheumatoid factor:  Stable.  This is managed by rheumatologist    Other fatigue:  Worsening  -     CBC auto differential;  Future; Expected date: 01/18/2019  -     Comprehensive metabolic panel; Future; Expected date: 01/18/2019    Class 2 drug-induced obesity with serious comorbidity and body mass index (BMI) of 39.0 to 39.9 in adult:  Worsening    Follow up with orthopedic specialist as directed secondary to left hip abnormalities, will call the patient after we have the results of the test, healthy habits, weight loss, decrease fried foods, red meat and processed starches.The patient's BMI has been recorded in the chart. The patient has been provided educational materials regarding the benefits of attaining and maintaining a normal weight. We will continue to address and follow this issue during follow up visits.Patient agreed with assessment and plan. Patient verbalized understanding.

## 2019-01-18 NOTE — PROGRESS NOTES
Patient, Vida Ornelas (MRN #[id number]), presented with a recorded BMI of 39.89 kg/m^2 and a documented comorbidity(s):  - Hypertension  - Hyperlipidemia  to which the severe obesity is a contributing factor. This is consistent with the definition of severe obesity (BMI 35.0-39.9) with comorbidity (ICD-10 E66.01, Z68.35). The patient's severe obesity was monitored, evaluated, addressed and/or treated. This addendum to the medical record is made on 01/18/2019.

## 2019-01-21 ENCOUNTER — LAB VISIT (OUTPATIENT)
Dept: LAB | Facility: HOSPITAL | Age: 64
End: 2019-01-21
Attending: FAMILY MEDICINE
Payer: MEDICARE

## 2019-01-21 DIAGNOSIS — E78.49 OTHER HYPERLIPIDEMIA: ICD-10-CM

## 2019-01-21 DIAGNOSIS — R53.83 OTHER FATIGUE: ICD-10-CM

## 2019-01-21 LAB
ALBUMIN SERPL BCP-MCNC: 3.2 G/DL
ALP SERPL-CCNC: 97 U/L
ALT SERPL W/O P-5'-P-CCNC: 18 U/L
ANION GAP SERPL CALC-SCNC: 8 MMOL/L
AST SERPL-CCNC: 22 U/L
BASOPHILS # BLD AUTO: 0.05 K/UL
BASOPHILS NFR BLD: 1.2 %
BILIRUB SERPL-MCNC: 0.5 MG/DL
BUN SERPL-MCNC: 17 MG/DL
CALCIUM SERPL-MCNC: 9.6 MG/DL
CHLORIDE SERPL-SCNC: 108 MMOL/L
CHOLEST SERPL-MCNC: 197 MG/DL
CHOLEST/HDLC SERPL: 3.1 {RATIO}
CO2 SERPL-SCNC: 27 MMOL/L
CREAT SERPL-MCNC: 0.7 MG/DL
DIFFERENTIAL METHOD: ABNORMAL
EOSINOPHIL # BLD AUTO: 0.1 K/UL
EOSINOPHIL NFR BLD: 2.7 %
ERYTHROCYTE [DISTWIDTH] IN BLOOD BY AUTOMATED COUNT: 14.2 %
EST. GFR  (AFRICAN AMERICAN): >60 ML/MIN/1.73 M^2
EST. GFR  (NON AFRICAN AMERICAN): >60 ML/MIN/1.73 M^2
GLUCOSE SERPL-MCNC: 84 MG/DL
HCT VFR BLD AUTO: 36.5 %
HDLC SERPL-MCNC: 64 MG/DL
HDLC SERPL: 32.5 %
HGB BLD-MCNC: 10.9 G/DL
IMM GRANULOCYTES # BLD AUTO: 0.01 K/UL
IMM GRANULOCYTES NFR BLD AUTO: 0.2 %
LDLC SERPL CALC-MCNC: 125 MG/DL
LYMPHOCYTES # BLD AUTO: 1.4 K/UL
LYMPHOCYTES NFR BLD: 33.8 %
MCH RBC QN AUTO: 27.8 PG
MCHC RBC AUTO-ENTMCNC: 29.9 G/DL
MCV RBC AUTO: 93 FL
MONOCYTES # BLD AUTO: 0.4 K/UL
MONOCYTES NFR BLD: 10 %
NEUTROPHILS # BLD AUTO: 2.1 K/UL
NEUTROPHILS NFR BLD: 52.1 %
NONHDLC SERPL-MCNC: 133 MG/DL
NRBC BLD-RTO: 0 /100 WBC
PLATELET # BLD AUTO: 216 K/UL
PMV BLD AUTO: 11.7 FL
POTASSIUM SERPL-SCNC: 4.5 MMOL/L
PROT SERPL-MCNC: 6.8 G/DL
RBC # BLD AUTO: 3.92 M/UL
SODIUM SERPL-SCNC: 143 MMOL/L
TRIGL SERPL-MCNC: 40 MG/DL
WBC # BLD AUTO: 4.08 K/UL

## 2019-01-21 PROCEDURE — 36415 COLL VENOUS BLD VENIPUNCTURE: CPT | Mod: PO

## 2019-01-21 PROCEDURE — 80053 COMPREHEN METABOLIC PANEL: CPT

## 2019-01-21 PROCEDURE — 85025 COMPLETE CBC W/AUTO DIFF WBC: CPT

## 2019-01-21 PROCEDURE — 80061 LIPID PANEL: CPT

## 2019-01-24 DIAGNOSIS — D50.0 IRON DEFICIENCY ANEMIA DUE TO CHRONIC BLOOD LOSS: Primary | ICD-10-CM

## 2019-01-25 DIAGNOSIS — M16.9 OSTEOARTHROSIS OF HIP: ICD-10-CM

## 2019-01-25 DIAGNOSIS — E03.9 ACQUIRED HYPOTHYROIDISM: ICD-10-CM

## 2019-01-25 DIAGNOSIS — M25.552 HIP PAIN, LEFT: ICD-10-CM

## 2019-01-25 DIAGNOSIS — M06.9 RHEUMATOID ARTHRITIS OF HAND, UNSPECIFIED LATERALITY, UNSPECIFIED RHEUMATOID FACTOR PRESENCE: ICD-10-CM

## 2019-01-25 DIAGNOSIS — R70.0 ESR RAISED: ICD-10-CM

## 2019-01-25 DIAGNOSIS — M16.0 PRIMARY OSTEOARTHRITIS OF BOTH HIPS: ICD-10-CM

## 2019-01-25 RX ORDER — DEXLANSOPRAZOLE 60 MG/1
CAPSULE, DELAYED RELEASE ORAL
Qty: 30 CAPSULE | Refills: 0 | Status: SHIPPED | OUTPATIENT
Start: 2019-01-25 | End: 2019-02-26 | Stop reason: SDUPTHER

## 2019-01-25 RX ORDER — LEFLUNOMIDE 20 MG/1
TABLET ORAL
Qty: 30 TABLET | Refills: 3 | Status: SHIPPED | OUTPATIENT
Start: 2019-01-25 | End: 2019-04-24 | Stop reason: SDUPTHER

## 2019-01-29 ENCOUNTER — LAB VISIT (OUTPATIENT)
Dept: LAB | Facility: HOSPITAL | Age: 64
End: 2019-01-29
Attending: FAMILY MEDICINE
Payer: MEDICARE

## 2019-01-29 ENCOUNTER — TELEPHONE (OUTPATIENT)
Dept: FAMILY MEDICINE | Facility: CLINIC | Age: 64
End: 2019-01-29

## 2019-01-29 DIAGNOSIS — D50.0 IRON DEFICIENCY ANEMIA DUE TO CHRONIC BLOOD LOSS: ICD-10-CM

## 2019-01-29 DIAGNOSIS — Z01.818 PREOPERATIVE CLEARANCE: Primary | ICD-10-CM

## 2019-01-29 LAB
BASOPHILS # BLD AUTO: 0.05 K/UL
BASOPHILS NFR BLD: 1.2 %
DIFFERENTIAL METHOD: ABNORMAL
EOSINOPHIL # BLD AUTO: 0.2 K/UL
EOSINOPHIL NFR BLD: 3.9 %
ERYTHROCYTE [DISTWIDTH] IN BLOOD BY AUTOMATED COUNT: 14 %
HCT VFR BLD AUTO: 36.7 %
HGB BLD-MCNC: 11.2 G/DL
IMM GRANULOCYTES # BLD AUTO: 0.01 K/UL
IMM GRANULOCYTES NFR BLD AUTO: 0.2 %
LYMPHOCYTES # BLD AUTO: 1.6 K/UL
LYMPHOCYTES NFR BLD: 39.3 %
MCH RBC QN AUTO: 28.1 PG
MCHC RBC AUTO-ENTMCNC: 30.5 G/DL
MCV RBC AUTO: 92 FL
MONOCYTES # BLD AUTO: 0.5 K/UL
MONOCYTES NFR BLD: 12.1 %
NEUTROPHILS # BLD AUTO: 1.8 K/UL
NEUTROPHILS NFR BLD: 43.3 %
NRBC BLD-RTO: 0 /100 WBC
PLATELET # BLD AUTO: 222 K/UL
PMV BLD AUTO: 12.1 FL
RBC # BLD AUTO: 3.99 M/UL
WBC # BLD AUTO: 4.12 K/UL

## 2019-01-29 PROCEDURE — 36415 COLL VENOUS BLD VENIPUNCTURE: CPT | Mod: HCNC,PO

## 2019-01-29 PROCEDURE — 85025 COMPLETE CBC W/AUTO DIFF WBC: CPT | Mod: HCNC

## 2019-01-29 NOTE — TELEPHONE ENCOUNTER
Spoke with patient and she stated she is scheduled to have her EKG done tomorrow in [place].  She will have them fax a copy to the office.

## 2019-01-29 NOTE — TELEPHONE ENCOUNTER
She will need a new EKG, the last EKG was done in 2017.  Can you please schedule the patient for tomorrow for pre operative clearance-EKG.  Orders are in place.  Thank you

## 2019-01-29 NOTE — TELEPHONE ENCOUNTER
----- Message from RT Nura sent at 1/29/2019  2:51 PM CST -----  Contact: ShantelPre Admit Nurse,[phone] [facility]  ShantelPre Admit Nurse,[phone] [facility], requesting a call back soon to confirm if the pt had any testing done and do she have a clearance, thanks.

## 2019-01-30 NOTE — TELEPHONE ENCOUNTER
----- Message from Teresita Olivera sent at 1/30/2019  4:06 PM CST -----  [facility]  Type: Needs Medical Advice    [facility]  Symptoms (please be specific):  na  How long has patient had these symptoms:  trent  Pharmacy name and phone #:  [email or web address]  Best Call Back Number: [phone]  Additional Information: regarding clearance for surgery on 2/4/19, Monday. Jacquelyn is sending a  Fax to the office, please call to discuss, thank you!

## 2019-01-30 NOTE — TELEPHONE ENCOUNTER
Spoke with Jacquelyn at [facility] and informed her that we did receive the pre-op clearance papers for Miss Ornelas via fax, and once PCP signs them, we will fax them back.

## 2019-02-01 ENCOUNTER — TELEPHONE (OUTPATIENT)
Dept: FAMILY MEDICINE | Facility: CLINIC | Age: 64
End: 2019-02-01

## 2019-02-01 NOTE — TELEPHONE ENCOUNTER
Spoke with Kendra at [facility] and informed her that we we would fax the corrected pre-op form over. Jacquelyn verbalized understanding.

## 2019-02-01 NOTE — TELEPHONE ENCOUNTER
----- Message from Elvia Bonner sent at 2/1/2019  8:39 AM CST -----   Jacquelyn with  [facility] calling   To  Speak to  The  Nurse about  Pt incomplete   preop  Form// please call [phone]  Please  Call today  Surgery  Is  monday

## 2019-02-26 RX ORDER — DEXLANSOPRAZOLE 60 MG/1
CAPSULE, DELAYED RELEASE ORAL
Qty: 30 CAPSULE | Refills: 0 | Status: SHIPPED | OUTPATIENT
Start: 2019-02-26 | End: 2019-03-26 | Stop reason: SDUPTHER

## 2019-03-04 ENCOUNTER — TELEPHONE (OUTPATIENT)
Dept: RHEUMATOLOGY | Facility: CLINIC | Age: 64
End: 2019-03-04

## 2019-03-04 NOTE — TELEPHONE ENCOUNTER
----- Message from Lynn Curry sent at 3/4/2019  9:49 AM CST -----  Contact: self [phone]  She is calling to let you know that she had a hip replacement last month.  She has to go to have another one on the same hip next month.  Thank you!

## 2019-03-26 RX ORDER — DEXLANSOPRAZOLE 60 MG/1
CAPSULE, DELAYED RELEASE ORAL
Qty: 30 CAPSULE | Refills: 0 | Status: SHIPPED | OUTPATIENT
Start: 2019-03-26 | End: 2019-04-24 | Stop reason: SDUPTHER

## 2019-04-24 ENCOUNTER — HOSPITAL ENCOUNTER (OUTPATIENT)
Dept: RADIOLOGY | Facility: HOSPITAL | Age: 64
Discharge: HOME | End: 2019-04-24
Attending: PHYSICIAN ASSISTANT
Payer: MEDICARE

## 2019-04-24 ENCOUNTER — OFFICE VISIT (OUTPATIENT)
Dept: FAMILY MEDICINE | Facility: CLINIC | Age: 64
End: 2019-04-24
Payer: MEDICARE

## 2019-04-24 VITALS
DIASTOLIC BLOOD PRESSURE: 80 MMHG | HEART RATE: 63 BPM | SYSTOLIC BLOOD PRESSURE: 130 MMHG | WEIGHT: 231.69 LBS | HEIGHT: 64 IN | BODY MASS INDEX: 39.55 KG/M2

## 2019-04-24 DIAGNOSIS — M79.604 RIGHT LEG PAIN: ICD-10-CM

## 2019-04-24 DIAGNOSIS — R35.0 URINARY FREQUENCY: Primary | ICD-10-CM

## 2019-04-24 DIAGNOSIS — N30.90 CYSTITIS: ICD-10-CM

## 2019-04-24 LAB
BACTERIA #/AREA URNS HPF: ABNORMAL /HPF
BILIRUB UR QL STRIP: NEGATIVE
CLARITY UR: CLEAR
COLOR UR: YELLOW
GLUCOSE UR QL STRIP: NEGATIVE
HGB UR QL STRIP: ABNORMAL
KETONES UR QL STRIP: NEGATIVE
LEUKOCYTE ESTERASE UR QL STRIP: ABNORMAL
MICROSCOPIC COMMENT: ABNORMAL
NITRITE UR QL STRIP: NEGATIVE
PH UR STRIP: 6 [PH] (ref 5–8)
PROT UR QL STRIP: ABNORMAL
RBC #/AREA URNS HPF: 50 /HPF (ref 0–4)
SP GR UR STRIP: 1.02 (ref 1–1.03)
URN SPEC COLLECT METH UR: ABNORMAL
WBC #/AREA URNS HPF: 50 /HPF (ref 0–5)

## 2019-04-24 PROCEDURE — 87086 URINE CULTURE/COLONY COUNT: CPT | Mod: HCNC

## 2019-04-24 PROCEDURE — 3079F DIAST BP 80-89 MM HG: CPT | Mod: HCNC,CPTII,S$GLB, | Performed by: PHYSICIAN ASSISTANT

## 2019-04-24 PROCEDURE — 3008F PR BODY MASS INDEX (BMI) DOCUMENTED: ICD-10-PCS | Mod: HCNC,CPTII,S$GLB, | Performed by: PHYSICIAN ASSISTANT

## 2019-04-24 PROCEDURE — 3079F PR MOST RECENT DIASTOLIC BLOOD PRESSURE 80-89 MM HG: ICD-10-PCS | Mod: HCNC,CPTII,S$GLB, | Performed by: PHYSICIAN ASSISTANT

## 2019-04-24 PROCEDURE — 81000 URINALYSIS NONAUTO W/SCOPE: CPT | Mod: HCNC,PO

## 2019-04-24 PROCEDURE — 93970 EXTREMITY STUDY: CPT | Mod: TC,HCNC,PO

## 2019-04-24 PROCEDURE — 99999 PR PBB SHADOW E&M-EST. PATIENT-LVL V: ICD-10-PCS | Mod: PBBFAC,HCNC,, | Performed by: PHYSICIAN ASSISTANT

## 2019-04-24 PROCEDURE — 93970 US LOWER EXTREMITY VEINS BILATERAL: ICD-10-PCS | Mod: 26,HCNC,, | Performed by: RADIOLOGY

## 2019-04-24 PROCEDURE — 3008F BODY MASS INDEX DOCD: CPT | Mod: HCNC,CPTII,S$GLB, | Performed by: PHYSICIAN ASSISTANT

## 2019-04-24 PROCEDURE — 93970 EXTREMITY STUDY: CPT | Mod: 26,HCNC,, | Performed by: RADIOLOGY

## 2019-04-24 PROCEDURE — 3075F SYST BP GE 130 - 139MM HG: CPT | Mod: HCNC,CPTII,S$GLB, | Performed by: PHYSICIAN ASSISTANT

## 2019-04-24 PROCEDURE — 99214 OFFICE O/P EST MOD 30 MIN: CPT | Mod: HCNC,S$GLB,, | Performed by: PHYSICIAN ASSISTANT

## 2019-04-24 PROCEDURE — 99214 PR OFFICE/OUTPT VISIT, EST, LEVL IV, 30-39 MIN: ICD-10-PCS | Mod: HCNC,S$GLB,, | Performed by: PHYSICIAN ASSISTANT

## 2019-04-24 PROCEDURE — 99999 PR PBB SHADOW E&M-EST. PATIENT-LVL V: CPT | Mod: PBBFAC,HCNC,, | Performed by: PHYSICIAN ASSISTANT

## 2019-04-24 PROCEDURE — 3075F PR MOST RECENT SYSTOLIC BLOOD PRESS GE 130-139MM HG: ICD-10-PCS | Mod: HCNC,CPTII,S$GLB, | Performed by: PHYSICIAN ASSISTANT

## 2019-04-24 RX ORDER — AZELASTINE 1 MG/ML
1 SPRAY, METERED NASAL 2 TIMES DAILY PRN
COMMUNITY
End: 2019-05-16

## 2019-04-24 RX ORDER — NITROFURANTOIN 25; 75 MG/1; MG/1
100 CAPSULE ORAL 2 TIMES DAILY
Qty: 14 CAPSULE | Refills: 0 | Status: SHIPPED | OUTPATIENT
Start: 2019-04-24 | End: 2019-05-01

## 2019-04-24 RX ORDER — CYCLOBENZAPRINE HCL 10 MG
10 TABLET ORAL 3 TIMES DAILY PRN
Refills: 0 | COMMUNITY
Start: 2019-03-12 | End: 2019-05-16

## 2019-04-24 RX ORDER — LOSARTAN POTASSIUM 25 MG/1
25 TABLET ORAL
COMMUNITY
End: 2019-06-10 | Stop reason: SDUPTHER

## 2019-04-24 RX ORDER — LEVOTHYROXINE SODIUM 25 UG/1
25 TABLET ORAL
COMMUNITY

## 2019-04-24 RX ORDER — PRAVASTATIN SODIUM 20 MG/1
20 TABLET ORAL
COMMUNITY
End: 2019-04-24

## 2019-04-24 RX ORDER — OXYCODONE AND ACETAMINOPHEN 10; 325 MG/1; MG/1
TABLET ORAL
COMMUNITY
Start: 2019-02-22 | End: 2019-05-16

## 2019-04-24 RX ORDER — LEFLUNOMIDE 20 MG/1
20 TABLET ORAL
COMMUNITY
End: 2019-07-01 | Stop reason: SDUPTHER

## 2019-04-24 RX ORDER — MULTIVITAMIN
1 TABLET ORAL
COMMUNITY
End: 2019-04-24 | Stop reason: SDUPTHER

## 2019-04-24 RX ORDER — AMOXICILLIN 250 MG
2 CAPSULE ORAL
COMMUNITY
Start: 2019-02-12 | End: 2019-08-06

## 2019-04-24 RX ORDER — ENOXAPARIN SODIUM 100 MG/ML
40 INJECTION SUBCUTANEOUS
COMMUNITY
Start: 2019-02-12 | End: 2019-04-30

## 2019-04-24 RX ORDER — RIVAROXABAN 20 MG/1
1 TABLET, FILM COATED ORAL DAILY
Refills: 0 | COMMUNITY
Start: 2019-03-14

## 2019-04-24 RX ORDER — DEXLANSOPRAZOLE 60 MG/1
60 CAPSULE, DELAYED RELEASE ORAL
COMMUNITY
End: 2019-05-16

## 2019-04-24 RX ORDER — FLUCONAZOLE 150 MG/1
TABLET ORAL
Refills: 0 | COMMUNITY
Start: 2019-03-19 | End: 2019-04-24

## 2019-04-24 RX ORDER — CLOTRIMAZOLE 1 %
1 CREAM (GRAM) TOPICAL 2 TIMES DAILY
Refills: 0 | COMMUNITY
Start: 2019-03-19 | End: 2019-05-16

## 2019-04-24 RX ORDER — CELECOXIB 200 MG/1
200 CAPSULE ORAL
COMMUNITY
Start: 2019-02-26 | End: 2019-05-16

## 2019-04-24 RX ORDER — LIDOCAINE HYDROCHLORIDE 20 MG/ML
SOLUTION ORAL; TOPICAL
Refills: 11 | Status: ON HOLD | COMMUNITY
Start: 2019-01-31 | End: 2019-09-09

## 2019-04-24 RX ORDER — SUCRALFATE 1 G/1
TABLET ORAL
COMMUNITY
Start: 2019-01-11 | End: 2019-05-16 | Stop reason: ALTCHOICE

## 2019-04-24 RX ORDER — CLOTRIMAZOLE 1 %
CREAM (GRAM) TOPICAL
COMMUNITY
Start: 2019-03-19 | End: 2019-08-06

## 2019-04-24 RX ORDER — CYCLOBENZAPRINE HCL 10 MG
TABLET ORAL
COMMUNITY
Start: 2019-03-12 | End: 2019-04-29

## 2019-04-24 RX ORDER — ENOXAPARIN SODIUM 100 MG/ML
INJECTION SUBCUTANEOUS
Refills: 0 | COMMUNITY
Start: 2019-02-12 | End: 2019-04-29

## 2019-04-24 NOTE — PROGRESS NOTES
Subjective:       Patient ID: Vida Ornelas is a 63 y.o. female    Chief Complaint: Cystitis (bladder infection for 2 days. Painful urination. Pain on right leg(has a stent in it) pain for 2 days. Pain goes from knee to hip)    HPI  The patient is new to me, PCP is Dr. Mora.  She presents today complaining of urinary frequency and dysuria that began 2 days ago.  She is also experiencing pain in her right thigh.  She describes the pain as an ache from her hip to her knee.    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  About 2 months ago she reports having a DVT in her right leg with a stent placement that was done at [facility].  She is currently taking Xarelto.  She denies any chest pain or shortness of breath.  She denies any swelling of her right lower extremity.    Review of Systems   Constitutional: Negative for activity change, chills and fever.   HENT: Negative for congestion and sore throat.    Eyes: Negative for visual disturbance.   Respiratory: Negative for cough and shortness of breath.    Cardiovascular: Negative for chest pain and palpitations.   Gastrointestinal: Negative for abdominal pain, diarrhea, nausea and vomiting.   Endocrine: Negative for polydipsia and polyuria.   Musculoskeletal: Negative for myalgias.   Skin: Negative for rash.   Neurological: Negative for headaches.   Psychiatric/Behavioral: The patient is not nervous/anxious.         Objective:   Physical Exam   Constitutional: She is oriented to person, place, and time. She appears well-developed and well-nourished. No distress.   HENT:   Head: Normocephalic and atraumatic.   Eyes: Pupils are equal, round, and reactive to light. EOM are normal.   Neck: Normal range of motion. Neck supple.   Cardiovascular: Normal rate, regular rhythm, normal heart sounds and intact distal pulses. Exam reveals no gallop and no friction  rub.   No murmur heard.  Pulmonary/Chest: Effort normal and breath sounds normal. No respiratory distress. She has no wheezes. She has no rales. She exhibits no tenderness.   Musculoskeletal: Normal range of motion. She exhibits edema and tenderness (Tenderness to palpation of the right thigh, no mass felt, no edema, no erythema).   Dorsalis pedis and anterior tibialis pulses palpable in the lower extremities bilaterally, no calf tenderness bilaterally, negative Taylor bilaterally   Neurological: She is alert and oriented to person, place, and time.   Skin: Skin is warm and dry. No rash noted. She is not diaphoretic.   Psychiatric: She has a normal mood and affect. Her behavior is normal. Judgment and thought content normal.        Assessment:       1. Urinary frequency  Urinalysis    Urine culture   2. Right leg pain  US Lower Extremity Veins Bilateral        Plan:       Urinary frequency  -     Urinalysis; Future; Expected date: 04/24/2019  -     Urine culture; Future; Expected date: 04/24/2019    Right leg pain  -     US Lower Extremity Veins Bilateral; Future; Expected date: 04/24/2019  - I advised patient to go to the emergency room if she has any new or worsening symptoms such as chest pains or shortness of breath.

## 2019-04-25 LAB
BACTERIA UR CULT: NORMAL
BACTERIA UR CULT: NORMAL

## 2019-04-25 RX ORDER — DEXLANSOPRAZOLE 60 MG/1
CAPSULE, DELAYED RELEASE ORAL
Qty: 30 CAPSULE | Refills: 0 | Status: SHIPPED | OUTPATIENT
Start: 2019-04-25 | End: 2019-05-16 | Stop reason: SDUPTHER

## 2019-04-29 ENCOUNTER — OFFICE VISIT (OUTPATIENT)
Dept: FAMILY MEDICINE | Facility: CLINIC | Age: 64
End: 2019-04-29
Payer: MEDICARE

## 2019-04-29 ENCOUNTER — TELEPHONE (OUTPATIENT)
Dept: FAMILY MEDICINE | Facility: CLINIC | Age: 64
End: 2019-04-29

## 2019-04-29 ENCOUNTER — HOSPITAL ENCOUNTER (OUTPATIENT)
Dept: RADIOLOGY | Facility: HOSPITAL | Age: 64
Discharge: HOME | End: 2019-04-29
Attending: FAMILY MEDICINE
Payer: MEDICARE

## 2019-04-29 VITALS
DIASTOLIC BLOOD PRESSURE: 72 MMHG | HEIGHT: 64 IN | HEART RATE: 63 BPM | SYSTOLIC BLOOD PRESSURE: 134 MMHG | WEIGHT: 233 LBS | BODY MASS INDEX: 39.78 KG/M2

## 2019-04-29 DIAGNOSIS — N93.9 VAGINAL BLEEDING: Primary | ICD-10-CM

## 2019-04-29 DIAGNOSIS — G89.29 CHRONIC BILATERAL LOW BACK PAIN WITHOUT SCIATICA: ICD-10-CM

## 2019-04-29 DIAGNOSIS — M54.50 CHRONIC BILATERAL LOW BACK PAIN WITHOUT SCIATICA: ICD-10-CM

## 2019-04-29 DIAGNOSIS — R30.0 DYSURIA: ICD-10-CM

## 2019-04-29 DIAGNOSIS — M79.604 PAIN OF RIGHT LOWER EXTREMITY: ICD-10-CM

## 2019-04-29 DIAGNOSIS — E78.49 OTHER HYPERLIPIDEMIA: ICD-10-CM

## 2019-04-29 DIAGNOSIS — E66.01 MORBID OBESITY: ICD-10-CM

## 2019-04-29 PROCEDURE — 3075F PR MOST RECENT SYSTOLIC BLOOD PRESS GE 130-139MM HG: ICD-10-PCS | Mod: HCNC,CPTII,S$GLB, | Performed by: FAMILY MEDICINE

## 2019-04-29 PROCEDURE — 3078F PR MOST RECENT DIASTOLIC BLOOD PRESSURE < 80 MM HG: ICD-10-PCS | Mod: HCNC,CPTII,S$GLB, | Performed by: FAMILY MEDICINE

## 2019-04-29 PROCEDURE — 99999 PR PBB SHADOW E&M-EST. PATIENT-LVL III: CPT | Mod: PBBFAC,HCNC,, | Performed by: FAMILY MEDICINE

## 2019-04-29 PROCEDURE — 3008F PR BODY MASS INDEX (BMI) DOCUMENTED: ICD-10-PCS | Mod: HCNC,CPTII,S$GLB, | Performed by: FAMILY MEDICINE

## 2019-04-29 PROCEDURE — 3078F DIAST BP <80 MM HG: CPT | Mod: HCNC,CPTII,S$GLB, | Performed by: FAMILY MEDICINE

## 2019-04-29 PROCEDURE — 72100 XR LUMBAR SPINE AP AND LATERAL: ICD-10-PCS | Mod: 26,HCNC,, | Performed by: RADIOLOGY

## 2019-04-29 PROCEDURE — 3075F SYST BP GE 130 - 139MM HG: CPT | Mod: HCNC,CPTII,S$GLB, | Performed by: FAMILY MEDICINE

## 2019-04-29 PROCEDURE — 3008F BODY MASS INDEX DOCD: CPT | Mod: HCNC,CPTII,S$GLB, | Performed by: FAMILY MEDICINE

## 2019-04-29 PROCEDURE — 99214 OFFICE O/P EST MOD 30 MIN: CPT | Mod: HCNC,S$GLB,, | Performed by: FAMILY MEDICINE

## 2019-04-29 PROCEDURE — 99214 PR OFFICE/OUTPT VISIT, EST, LEVL IV, 30-39 MIN: ICD-10-PCS | Mod: HCNC,S$GLB,, | Performed by: FAMILY MEDICINE

## 2019-04-29 PROCEDURE — 99999 PR PBB SHADOW E&M-EST. PATIENT-LVL III: ICD-10-PCS | Mod: PBBFAC,HCNC,, | Performed by: FAMILY MEDICINE

## 2019-04-29 PROCEDURE — 72100 X-RAY EXAM L-S SPINE 2/3 VWS: CPT | Mod: 26,HCNC,, | Performed by: RADIOLOGY

## 2019-04-29 PROCEDURE — 72100 X-RAY EXAM L-S SPINE 2/3 VWS: CPT | Mod: TC,HCNC,FY,PO

## 2019-04-29 RX ORDER — TIZANIDINE 4 MG/1
4 TABLET ORAL EVERY 8 HOURS
Qty: 30 TABLET | Refills: 0 | Status: SHIPPED | OUTPATIENT
Start: 2019-04-29 | End: 2019-05-09

## 2019-04-29 RX ORDER — PRAVASTATIN SODIUM 20 MG/1
TABLET ORAL
COMMUNITY
Start: 2019-04-25 | End: 2019-05-16

## 2019-04-29 NOTE — TELEPHONE ENCOUNTER
Callback to patient, asking for appt sampson Mora only--wants to discuss something personal, c/o blood when wiping, no blood in urine--appt scheduled for 1030

## 2019-04-29 NOTE — PROGRESS NOTES
Subjective:       Patient ID: Vida Ornelas is a 63 y.o. female.    Chief Complaint: Vaginal Bleeding (states when she has intercourse she bleeds. Noticed it 2 weeks ago, some pain after intecourse)    HPI     The patient is coming here today complaining of vaginal bleeding for the last 2 weeks, she noticed also bleeding after intercourse, the patient currently is taking blood thinners secondary to blood clots.  The patient denies any other sources of bleeding.  She was placed on antibiotics 2 weeks ago secondary to UTI, MacroBid which seemed to help.    The patient also complained of severe pain on the right side lower back, also pain on the right lower extremity, difficulty to walk, the patient is currently under the pain management taking pain medication, but stated the medication is not working well for the pain on the right lower extremity.  The patient feels like a shooting pain on the right lower extremity, denies any numbness or tingling sensation.  Two weeks ago had a ultrasound that was negative for any clots in the leg.    He is currently taking cholesterol medication every night as directed, denies any problems taking the medication.    The patient has been taking her blood pressure medication without problems, she not bring a log of her blood pressure.  Her blood pressure today is in normal range.    Past medical history, past social history was reviewed discussed with the patient.      Review of Systems   Constitutional: Negative for activity change and appetite change.   HENT: Negative for congestion and dental problem.    Eyes: Negative for discharge and itching.   Respiratory: Negative for apnea and chest tightness.    Musculoskeletal: Positive for arthralgias and back pain.       Objective:      Physical Exam   Constitutional: She appears well-developed and well-nourished. She appears distressed.   Difficulty to walk   HENT:   Head: Normocephalic and atraumatic.   Right Ear: External ear  normal.   Left Ear: External ear normal.   Mouth/Throat: Oropharynx is clear and moist.   Eyes: Pupils are equal, round, and reactive to light. Conjunctivae are normal. Right eye exhibits no discharge. Left eye exhibits no discharge.   Cardiovascular: Normal rate, regular rhythm and normal heart sounds.   No murmur heard.  Pulmonary/Chest: Effort normal and breath sounds normal. No respiratory distress. She has no wheezes.   Musculoskeletal: She exhibits tenderness (Tenderness to palpation on the right side of lower). She exhibits no deformity.   Neurological: No cranial nerve deficit.   Skin: She is not diaphoretic. No erythema. No pallor.   Psychiatric: She has a normal mood and affect. Her behavior is normal. Judgment and thought content normal.   Nursing note and vitals reviewed.      Assessment:       1. Vaginal bleeding    2. Dysuria    3. Morbid obesity    4. Pain of right lower extremity    5. Chronic right-sided low back pain without sciatica    6. Chronic bilateral low back pain without sciatica        Plan:       Vaginal bleeding:  New problem, workup needed  -     CBC auto differential; Future; Expected date: 04/29/2019  -     Comprehensive metabolic panel; Future; Expected date: 04/29/2019    Dysuria:  Improved.  -     Urinalysis; Future; Expected date: 04/29/2019  -     Urine culture; Future; Expected date: 04/29/2019    Morbid obesity:  Stable.    Pain of right lower extremity:  Worsening  -     tiZANidine (ZANAFLEX) 4 MG tablet; Take 1 tablet (4 mg total) by mouth every 8 (eight) hours. for 10 days  Dispense: 30 tablet; Refill: 0    Chronic bilateral low back pain without sciatica:  Worsening  -     tiZANidine (ZANAFLEX) 4 MG tablet; Take 1 tablet (4 mg total) by mouth every 8 (eight) hours. for 10 days  Dispense: 30 tablet; Refill: 0  -     X-Ray Lumbar Spine AP And Lateral; Future; Expected date: 04/29/2019    Other hyperlipidemia:  Uncontrolled    Will start patient on tizanidine 4 mg every 8 hr  as needed for muscle spasms, will order x-rays of the lumbar spine, will call the patient after we have the results of the test.The patient's BMI has been recorded in the chart. The patient has been provided educational materials regarding the benefits of attaining and maintaining a normal weight. We will continue to address and follow this issue during follow up visits.  If the symptoms get worse, the patient will notify us immediately, ER warning signs given to the patient.  Patient agreed with assessment and plan. Patient verbalized understanding.

## 2019-04-29 NOTE — TELEPHONE ENCOUNTER
----- Message from Vaibhav PRADO Frisard sent at 4/29/2019  7:34 AM CDT -----  Contact: yecenia  Patient called in and wants to be seen today Monday 4/29/19.  Patient stated after she urinates and wipes herself there is some blood on toilet paper.  Blood is not in urine when urinating though.  Patient only wants to see Dr. Mora and needs to be seen today.    Patient call back number is [phone]

## 2019-04-30 DIAGNOSIS — K21.9 GASTROESOPHAGEAL REFLUX DISEASE WITHOUT ESOPHAGITIS: ICD-10-CM

## 2019-04-30 DIAGNOSIS — D50.8 OTHER IRON DEFICIENCY ANEMIA: Primary | ICD-10-CM

## 2019-05-07 ENCOUNTER — LAB VISIT (OUTPATIENT)
Dept: LAB | Facility: HOSPITAL | Age: 64
End: 2019-05-07
Attending: FAMILY MEDICINE
Payer: MEDICARE

## 2019-05-07 ENCOUNTER — OFFICE VISIT (OUTPATIENT)
Dept: FAMILY MEDICINE | Facility: CLINIC | Age: 64
End: 2019-05-07
Payer: MEDICARE

## 2019-05-07 VITALS
BODY MASS INDEX: 39.62 KG/M2 | WEIGHT: 230.81 LBS | SYSTOLIC BLOOD PRESSURE: 132 MMHG | DIASTOLIC BLOOD PRESSURE: 82 MMHG | OXYGEN SATURATION: 97 % | HEART RATE: 80 BPM

## 2019-05-07 DIAGNOSIS — I10 ESSENTIAL HYPERTENSION: Primary | ICD-10-CM

## 2019-05-07 DIAGNOSIS — D50.8 OTHER IRON DEFICIENCY ANEMIA: ICD-10-CM

## 2019-05-07 DIAGNOSIS — Z86.711 HISTORY OF PULMONARY EMBOLISM: ICD-10-CM

## 2019-05-07 DIAGNOSIS — E66.01 CLASS 2 SEVERE OBESITY DUE TO EXCESS CALORIES WITH SERIOUS COMORBIDITY AND BODY MASS INDEX (BMI) OF 39.0 TO 39.9 IN ADULT: ICD-10-CM

## 2019-05-07 DIAGNOSIS — M25.551 PAIN OF RIGHT HIP JOINT: ICD-10-CM

## 2019-05-07 LAB
BASOPHILS # BLD AUTO: 0.05 K/UL (ref 0–0.2)
BASOPHILS NFR BLD: 0.9 % (ref 0–1.9)
DIFFERENTIAL METHOD: ABNORMAL
EOSINOPHIL # BLD AUTO: 0.2 K/UL (ref 0–0.5)
EOSINOPHIL NFR BLD: 2.9 % (ref 0–8)
ERYTHROCYTE [DISTWIDTH] IN BLOOD BY AUTOMATED COUNT: 16.1 % (ref 11.5–14.5)
HCT VFR BLD AUTO: 34.5 % (ref 37–48.5)
HGB BLD-MCNC: 10.7 G/DL (ref 12–16)
IMM GRANULOCYTES # BLD AUTO: 0.01 K/UL (ref 0–0.04)
IMM GRANULOCYTES NFR BLD AUTO: 0.2 % (ref 0–0.5)
LYMPHOCYTES # BLD AUTO: 2 K/UL (ref 1–4.8)
LYMPHOCYTES NFR BLD: 34.5 % (ref 18–48)
MCH RBC QN AUTO: 26 PG (ref 27–31)
MCHC RBC AUTO-ENTMCNC: 31 G/DL (ref 32–36)
MCV RBC AUTO: 84 FL (ref 82–98)
MONOCYTES # BLD AUTO: 0.6 K/UL (ref 0.3–1)
MONOCYTES NFR BLD: 9.5 % (ref 4–15)
NEUTROPHILS # BLD AUTO: 3 K/UL (ref 1.8–7.7)
NEUTROPHILS NFR BLD: 52 % (ref 38–73)
NRBC BLD-RTO: 0 /100 WBC
PLATELET # BLD AUTO: 190 K/UL (ref 150–350)
PMV BLD AUTO: 13 FL (ref 9.2–12.9)
RBC # BLD AUTO: 4.12 M/UL (ref 4–5.4)
WBC # BLD AUTO: 5.77 K/UL (ref 3.9–12.7)

## 2019-05-07 PROCEDURE — 99499 RISK ADDL DX/OHS AUDIT: ICD-10-PCS | Mod: HCNC,S$GLB,, | Performed by: FAMILY MEDICINE

## 2019-05-07 PROCEDURE — 3008F BODY MASS INDEX DOCD: CPT | Mod: HCNC,CPTII,S$GLB, | Performed by: FAMILY MEDICINE

## 2019-05-07 PROCEDURE — 3079F DIAST BP 80-89 MM HG: CPT | Mod: HCNC,CPTII,S$GLB, | Performed by: FAMILY MEDICINE

## 2019-05-07 PROCEDURE — 3008F PR BODY MASS INDEX (BMI) DOCUMENTED: ICD-10-PCS | Mod: HCNC,CPTII,S$GLB, | Performed by: FAMILY MEDICINE

## 2019-05-07 PROCEDURE — 3079F PR MOST RECENT DIASTOLIC BLOOD PRESSURE 80-89 MM HG: ICD-10-PCS | Mod: HCNC,CPTII,S$GLB, | Performed by: FAMILY MEDICINE

## 2019-05-07 PROCEDURE — 99999 PR PBB SHADOW E&M-EST. PATIENT-LVL III: CPT | Mod: PBBFAC,HCNC,, | Performed by: FAMILY MEDICINE

## 2019-05-07 PROCEDURE — 99214 PR OFFICE/OUTPT VISIT, EST, LEVL IV, 30-39 MIN: ICD-10-PCS | Mod: HCNC,S$GLB,, | Performed by: FAMILY MEDICINE

## 2019-05-07 PROCEDURE — 99999 PR PBB SHADOW E&M-EST. PATIENT-LVL III: ICD-10-PCS | Mod: PBBFAC,HCNC,, | Performed by: FAMILY MEDICINE

## 2019-05-07 PROCEDURE — 85025 COMPLETE CBC W/AUTO DIFF WBC: CPT | Mod: HCNC

## 2019-05-07 PROCEDURE — 3075F PR MOST RECENT SYSTOLIC BLOOD PRESS GE 130-139MM HG: ICD-10-PCS | Mod: HCNC,CPTII,S$GLB, | Performed by: FAMILY MEDICINE

## 2019-05-07 PROCEDURE — 3075F SYST BP GE 130 - 139MM HG: CPT | Mod: HCNC,CPTII,S$GLB, | Performed by: FAMILY MEDICINE

## 2019-05-07 PROCEDURE — 99499 UNLISTED E&M SERVICE: CPT | Mod: HCNC,S$GLB,, | Performed by: FAMILY MEDICINE

## 2019-05-07 PROCEDURE — 36415 COLL VENOUS BLD VENIPUNCTURE: CPT | Mod: HCNC,PO

## 2019-05-07 PROCEDURE — 99214 OFFICE O/P EST MOD 30 MIN: CPT | Mod: HCNC,S$GLB,, | Performed by: FAMILY MEDICINE

## 2019-05-07 NOTE — PATIENT INSTRUCTIONS
Eating Heart-Healthy Food: Using the DASH Plan    Eating for your heart doesnt have to be hard or boring. You just need to know how to make healthier choices. The DASH eating plan has been developed to help you do just that. DASH stands for Dietary Approaches to Stop Hypertension. It is a plan that has been proven to be healthier for your heart and to lower your risk for high blood pressure. It can also help lower your risk for cancer, heart disease, osteoporosis, and diabetes.  Choosing from each food group  Choose foods from each of the food groups below each day. Try to get the recommended number of servings for each food group. The serving numbers are based on a diet of 2,000 calories a day. Talk to your doctor if youre unsure about your calorie needs. Along with getting the correct servings, the DASH plan also recommends a sodium intake less than 2,300 mg per day.        Grains  Servings: 6 to 8 a day  A serving is:  · 1 slice bread  · 1 ounce dry cereal  · Half a cup cooked rice, pasta or cereal  Best choices: Whole grains and any grains high in fiber. Vegetables  Servings: 4 to 5 a day  A serving is:  · 1 cup raw leafy vegetable  · Half a cup cut-up raw or cooked vegetable  · Half a cup vegetable juice  Best choices: Fresh or frozen vegetables prepared without added salt or fat.   Fruits  Servings: 4 to 5 a day  A serving is:  · 1 medium fruit  · One-quarter cup dried fruit  · Half a cup fresh, frozen, or canned fruit  · Half a cup of 100% fruit juices  Best choices: A variety of fresh fruits of different colors. Whole fruits are a better choice than fruit juices. Low-fat or fat-free dairy  Servings: 2 to 3 a day  A serving is:  · 1 cup milk  · 1 cup yogurt  · One and a half ounces cheese  Best choices: Skim or 1% milk, low-fat or fat-free yogurt or buttermilk, and low-fat cheeses.         Lean meats, poultry, fish  Servings: 6 or fewer a day  A serving is:  · 1 ounce cooked meats, poultry, or fish  · 1  egg  Best choices: Lean poultry and fish. Trim away visible fat. Broil, grill, roast, or boil instead of frying. Remove skin from poultry before eating. Limit how much red meat you eat.  Nuts, seeds, beans  Servings: 4 to 5 a week  A serving is:  · One-third cup nuts (one and a half ounces)  · 2 tablespoons nut butter or seeds  · Half a cup cooked dry beans or legumes  Best choices: Dry roasted nuts with no salt added, lentils, kidney beans, garbanzo beans, and whole feliz beans.   Fats and oils  Servings: 2 to 3 a day  A serving is:  · 1 teaspoon vegetable oil  · 1 teaspoon soft margarine  · 1 tablespoon mayonnaise  · 2 tablespoons salad dressing  Best choices: Nut and vegetable oils (nontropical vegetable oils), such as olive and canola oil. Sweets  Servings: 5 a week or fewer  A serving is:  · 1 tablespoon sugar, maple syrup, or honey  · 1 tablespoon jam or jelly  · 1 half-ounce jelly beans (about 15)  · 1 cup lemonade  Best choices: Dried fruit can be a satisfying sweet. Choose low-fat sweets. And watch your serving sizes!      For more on the DASH eating plan, visit:  www.nhlbi.nih.gov/health/health-topics/topics/dash   Date Last Reviewed: 6/1/2016  © 3143-3144 [employer]. [address] [place] [address], [place] [address]. All rights reserved. This information is not intended as a substitute for professional medical care. Always follow your healthcare professional's instructions.

## 2019-05-09 ENCOUNTER — TELEPHONE (OUTPATIENT)
Dept: FAMILY MEDICINE | Facility: CLINIC | Age: 64
End: 2019-05-09

## 2019-05-09 PROBLEM — Z86.711 HISTORY OF PULMONARY EMBOLISM: Status: ACTIVE | Noted: 2019-05-09

## 2019-05-09 PROBLEM — M25.551 PAIN OF RIGHT HIP JOINT: Status: ACTIVE | Noted: 2019-04-29

## 2019-05-09 NOTE — TELEPHONE ENCOUNTER
----- Message from Grace Santana sent at 5/9/2019 10:45 AM CDT -----  Contact: Patient  Type: Needs Medical Advice    Who Called:  Mrs. Vida Wynn Call Back Number: [phone]  Additional Information: Patient is needing to speak with a nurse in regards to some medications she is on.Please call back and advise.

## 2019-05-09 NOTE — PROGRESS NOTES
Subjective:       Patient ID: Vida Ornelas is a 63 y.o. female.    Chief Complaint: Leg Pain (right leg pain) and Follow-up    The patient has history of pulmonary embolism, currently seen the pulmonologist secondary to this problems, has an appointment to see him in 1 month, she stated that she is out of the Xarelto, last dosage was taking yesterday.  She also states that the symptoms of vaginal bleeding are resolved, she stop taking Celebrex and the Xarelto and since then she is not bleeding anymore.    Hip Pain    There was no injury mechanism. The pain is present in the right hip. The quality of the pain is described as aching. The pain is severe. The pain has been worsening since onset. Associated symptoms include an inability to bear weight and muscle weakness. Pertinent negatives include no loss of motion or loss of sensation. She reports no foreign bodies present. The symptoms are aggravated by palpation, movement and weight bearing. She has tried elevation, non-weight bearing, acetaminophen and rest for the symptoms. The treatment provided mild relief.   Hypertension   This is a chronic problem. The current episode started more than 1 year ago. The problem is unchanged. The problem is controlled. Pertinent negatives include no anxiety, blurred vision, chest pain, malaise/fatigue, neck pain, orthopnea, peripheral edema or PND. There are no associated agents to hypertension. Risk factors for coronary artery disease include dyslipidemia, obesity, post-menopausal state, sedentary lifestyle and stress. Past treatments include angiotensin blockers. The current treatment provides moderate improvement. Compliance problems include diet and exercise.  There is no history of angina, kidney disease, CVA, heart failure or PVD. There is no history of chronic renal disease or a hypertension causing med.      Past medical history, past social history was reviewed and discussed with the patient.    Review of Systems    Constitutional: Positive for fatigue. Negative for fever and malaise/fatigue.   Eyes: Negative for blurred vision.   Cardiovascular: Negative for chest pain, orthopnea, leg swelling and PND.   Gastrointestinal: Negative for abdominal distention and abdominal pain.   Musculoskeletal: Positive for arthralgias, back pain and gait problem. Negative for neck pain.       Objective:      Physical Exam   Constitutional: She appears well-developed and well-nourished. No distress.   HENT:   Head: Normocephalic and atraumatic.   Right Ear: External ear normal.   Left Ear: External ear normal.   Nose: Nose normal.   Mouth/Throat: Oropharynx is clear and moist. No oropharyngeal exudate.   Eyes: Pupils are equal, round, and reactive to light. Conjunctivae are normal. Right eye exhibits no discharge. Left eye exhibits no discharge.   Neck: Neck supple. No thyromegaly present.   Cardiovascular: Normal rate, regular rhythm and normal heart sounds.   No murmur heard.  Pulmonary/Chest: Effort normal and breath sounds normal. No respiratory distress. She has no wheezes.   Abdominal: She exhibits no distension. There is no tenderness.   Musculoskeletal: She exhibits tenderness (Right hip with decreased range of motion and tenderness to palpation.). She exhibits no deformity.   Neurological: No cranial nerve deficit.   Skin: No rash noted. She is not diaphoretic. No erythema. No pallor.   Psychiatric: She has a normal mood and affect. Her behavior is normal. Judgment and thought content normal.   Nursing note and vitals reviewed.      Assessment:       1. Essential hypertension    2. History of pulmonary embolism    3. Pain of right hip joint        Plan:       Essential hypertension:  Well controlled    History of pulmonary embolism:  Stable.  The patient will follow up with the pulmonologist secondary to history of pulmonary embolism and the patient will ask the pulmonologist if she needs to continue taking Xarelto or not.    Pain of  right hip joint:  Recommend follow with the rheumatologist as soon as possible secondary to the history of rheumatoid arthritis and severe pain on the joint, also offer patient to see the orthopedic specialist, she will follow up with her orthopedic specialist who did her left hip replacement as soon as possible.    Class 2 severe obesity due to excess calories with serious comorbidity and body mass index (BMI) of 39.0 to 39.9 in adult:  Worsening    I spent 30 min in this encounter, from this time more than 50% of the time was spent in counseling and plan of care for this patient. I will recommend the patient do not take Celebrex because of the history of vaginal bleeding and worsening anemia, she will follow the rheumatologist secondary to rheumatoid arthritis, pulmonologist secondary to the history of pulmonary embolism, and orthopedic as directed for the right hip pain, ER warning signs given to the patient, weight loss, eat healthy.The patient's BMI has been recorded in the chart. The patient has been provided educational materials regarding the benefits of attaining and maintaining a normal weight. We will continue to address and follow this issue during follow up visits.Patient agreed with assessment and plan. Patient verbalized understanding.

## 2019-05-09 NOTE — TELEPHONE ENCOUNTER
Pt states that you put her back on Xarelto. She would like to know if she can still take her Celebrex as well. Please advise.

## 2019-05-10 ENCOUNTER — TELEPHONE (OUTPATIENT)
Dept: GASTROENTEROLOGY | Facility: CLINIC | Age: 64
End: 2019-05-10

## 2019-05-10 NOTE — TELEPHONE ENCOUNTER
----- Message from Alberto Rivera sent at 5/10/2019  1:20 PM CDT -----  Contact: patient  Type:  Sooner Apoointment Request    Caller is requesting a sooner appointment.  Caller declined first available appointment listed below.  Caller will not accept being placed on the waitlist and is requesting a message be sent to doctor.    Name of Caller:  Patient  When is the first available appointment?  05/23  Symptoms:   Best Call Back Number:  [phone]  Additional Information:  Requesting a call back to confirm appointment,referral in epic

## 2019-05-15 ENCOUNTER — TELEPHONE (OUTPATIENT)
Dept: FAMILY MEDICINE | Facility: CLINIC | Age: 64
End: 2019-05-15

## 2019-05-15 NOTE — TELEPHONE ENCOUNTER
Spoke to pt. Pt states she was advised to stop taking the Xarelto on Monday 5/20 just for the surgery.

## 2019-05-15 NOTE — TELEPHONE ENCOUNTER
Spoke to pt. Pt states she just found out that she is having a hip replacement on 5/23. Pt states she has been in to see Dr. Mora recently and is wondering if she could be cleared for surgery. Pt states that Dr. Oneill is doing the surgery.    Phone: [phone]  Fax: [phone]

## 2019-05-15 NOTE — TELEPHONE ENCOUNTER
----- Message from Alissa Jones sent at 5/15/2019  9:34 AM CDT -----  Type:  Sooner Apoointment Request    Caller is requesting a sooner appointment.  Caller declined first available appointment listed below.  Caller will not accept being placed on the waitlist and is requesting a message be sent to doctor.    Name of Caller:  Self....When is the first available appointment?  05/28   Symptoms:  Hip replacement   Best Call Back Number:  [phone]   Additional Information:  Surgery is set for 05/23 ... Was seen twice last week

## 2019-05-15 NOTE — TELEPHONE ENCOUNTER
Pt states that her pulmonologist called her today and told her to stop taking her Xarelto on Monday, 5/20/2019.

## 2019-05-15 NOTE — TELEPHONE ENCOUNTER
The patient is taking Xarelto due to the history of clots, currently seen the pulmonologist, she will have to discuss with him about discontinue the medicine due to the surgery coming and history of clots, to see if is safe for her to discontinue the medication.  Thank you

## 2019-05-15 NOTE — TELEPHONE ENCOUNTER
Can you please clarify if this was just for the surgery or to stop completely for the treatment for clots.  Please let me know.  Thank you

## 2019-05-16 ENCOUNTER — OFFICE VISIT (OUTPATIENT)
Dept: GASTROENTEROLOGY | Facility: CLINIC | Age: 64
End: 2019-05-16
Payer: MEDICARE

## 2019-05-16 ENCOUNTER — LAB VISIT (OUTPATIENT)
Dept: LAB | Facility: HOSPITAL | Age: 64
End: 2019-05-16
Attending: FAMILY MEDICINE
Payer: MEDICARE

## 2019-05-16 VITALS
SYSTOLIC BLOOD PRESSURE: 120 MMHG | HEART RATE: 71 BPM | HEIGHT: 64 IN | BODY MASS INDEX: 38.88 KG/M2 | RESPIRATION RATE: 18 BRPM | WEIGHT: 227.75 LBS | DIASTOLIC BLOOD PRESSURE: 67 MMHG

## 2019-05-16 DIAGNOSIS — Z51.81 ENCOUNTER FOR MONITORING LONG-TERM PROTON PUMP INHIBITOR THERAPY: ICD-10-CM

## 2019-05-16 DIAGNOSIS — R19.5 OCCULT BLOOD POSITIVE STOOL: ICD-10-CM

## 2019-05-16 DIAGNOSIS — Z79.899 ENCOUNTER FOR MONITORING LONG-TERM PROTON PUMP INHIBITOR THERAPY: ICD-10-CM

## 2019-05-16 DIAGNOSIS — Z79.1 NSAID LONG-TERM USE: ICD-10-CM

## 2019-05-16 DIAGNOSIS — Z87.19 HISTORY OF GASTRITIS: ICD-10-CM

## 2019-05-16 DIAGNOSIS — K21.9 GASTROESOPHAGEAL REFLUX DISEASE WITHOUT ESOPHAGITIS: ICD-10-CM

## 2019-05-16 DIAGNOSIS — D64.9 ANEMIA, UNSPECIFIED TYPE: Primary | ICD-10-CM

## 2019-05-16 LAB
MAGNESIUM SERPL-MCNC: 1.6 MG/DL (ref 1.6–2.6)
VIT B12 SERPL-MCNC: 656 PG/ML (ref 210–950)

## 2019-05-16 PROCEDURE — 99999 PR PBB SHADOW E&M-EST. PATIENT-LVL IV: CPT | Mod: PBBFAC,HCNC,, | Performed by: NURSE PRACTITIONER

## 2019-05-16 PROCEDURE — 3078F DIAST BP <80 MM HG: CPT | Mod: HCNC,CPTII,S$GLB, | Performed by: NURSE PRACTITIONER

## 2019-05-16 PROCEDURE — 83735 ASSAY OF MAGNESIUM: CPT | Mod: HCNC

## 2019-05-16 PROCEDURE — 3074F PR MOST RECENT SYSTOLIC BLOOD PRESSURE < 130 MM HG: ICD-10-PCS | Mod: HCNC,CPTII,S$GLB, | Performed by: NURSE PRACTITIONER

## 2019-05-16 PROCEDURE — 99999 PR PBB SHADOW E&M-EST. PATIENT-LVL IV: ICD-10-PCS | Mod: PBBFAC,HCNC,, | Performed by: NURSE PRACTITIONER

## 2019-05-16 PROCEDURE — 82607 VITAMIN B-12: CPT | Mod: HCNC

## 2019-05-16 PROCEDURE — 99214 OFFICE O/P EST MOD 30 MIN: CPT | Mod: HCNC,S$GLB,, | Performed by: NURSE PRACTITIONER

## 2019-05-16 PROCEDURE — 99499 UNLISTED E&M SERVICE: CPT | Mod: HCNC,S$GLB,, | Performed by: NURSE PRACTITIONER

## 2019-05-16 PROCEDURE — 99499 RISK ADDL DX/OHS AUDIT: ICD-10-PCS | Mod: HCNC,S$GLB,, | Performed by: NURSE PRACTITIONER

## 2019-05-16 PROCEDURE — 99214 PR OFFICE/OUTPT VISIT, EST, LEVL IV, 30-39 MIN: ICD-10-PCS | Mod: HCNC,S$GLB,, | Performed by: NURSE PRACTITIONER

## 2019-05-16 PROCEDURE — 3078F PR MOST RECENT DIASTOLIC BLOOD PRESSURE < 80 MM HG: ICD-10-PCS | Mod: HCNC,CPTII,S$GLB, | Performed by: NURSE PRACTITIONER

## 2019-05-16 PROCEDURE — 3008F PR BODY MASS INDEX (BMI) DOCUMENTED: ICD-10-PCS | Mod: HCNC,CPTII,S$GLB, | Performed by: NURSE PRACTITIONER

## 2019-05-16 PROCEDURE — 3008F BODY MASS INDEX DOCD: CPT | Mod: HCNC,CPTII,S$GLB, | Performed by: NURSE PRACTITIONER

## 2019-05-16 PROCEDURE — 3074F SYST BP LT 130 MM HG: CPT | Mod: HCNC,CPTII,S$GLB, | Performed by: NURSE PRACTITIONER

## 2019-05-16 RX ORDER — MELOXICAM 7.5 MG/1
7.5 TABLET ORAL
COMMUNITY
End: 2019-08-06

## 2019-05-16 RX ORDER — DEXLANSOPRAZOLE 60 MG/1
1 CAPSULE, DELAYED RELEASE ORAL DAILY
Qty: 30 CAPSULE | Refills: 11 | Status: SHIPPED | OUTPATIENT
Start: 2019-05-16 | End: 2019-06-10 | Stop reason: SDUPTHER

## 2019-05-16 NOTE — PROGRESS NOTES
Subjective:       Patient ID: Vida Orneals is a 63 y.o. female Body mass index is 39.09 kg/m².    Chief Complaint: Follow-up    Established patient of Dr. Davis & myself.  Referred by Dr. Mora for iron deficiency anemia and GERD    Gastroesophageal Reflux   She complains of heartburn. She reports no abdominal pain, no chest pain, no choking, no coughing, no dysphagia, no globus sensation, no hoarse voice, no nausea, no sore throat or no water brash. This is a chronic problem. Episode onset: several years ago. The problem occurs rarely. The problem has been resolved (since she stopped celebrex and on acid reducing medication). The heartburn does not wake her from sleep. The symptoms are aggravated by stress and certain foods (red sauce, spicy foods, peanut, sweets). Associated symptoms include anemia and weight loss (lost 4 lbs over the past few months with dieting and exercise). Pertinent negatives include no fatigue or melena (resolved). Risk factors include caffeine use, obesity, NSAIDs and smoking/tobacco exposure (mobic daily; celebrex was discontinued; former smoker). She has tried a PPI and a histamine-2 antagonist (dexilant 60 mg once daily; carafate 1 gram qid; PAST: omeprazole, zantac- stopped ~3/2019) for the symptoms. The treatment provided significant relief. Past procedures include an abdominal ultrasound, an EGD and a UGI.     Review of Systems   Constitutional: Positive for appetite change (back to normal) and weight loss (lost 4 lbs over the past few months with dieting and exercise). Negative for chills, diaphoresis and fatigue.   HENT: Negative for hoarse voice, sore throat and trouble swallowing (resolved).    Respiratory: Negative for cough, choking and shortness of breath.    Cardiovascular: Negative for chest pain.   Gastrointestinal: Positive for heartburn. Negative for abdominal distention, abdominal pain, anal bleeding, blood in stool, constipation, diarrhea, dysphagia, melena  (resolved), nausea, rectal pain and vomiting.   Genitourinary: Negative for difficulty urinating and flank pain.   Musculoskeletal: Negative for back pain.        Sees rheumatology for RA; Patient reports she is in a wheelchair because she is having problems with her hip and needs to have hip surgery; taking norco once daily   Neurological: Negative for weakness.       No LMP recorded. Patient has had a hysterectomy.    Past Medical History:   Diagnosis Date    Arthritis     Seronegative rheumatoid arthritis    Back pain     s/p ena     Cataract     Colon polyp     ESR raised     Former smoker     GERD with stricture     Hyperlipidemia     Hypertension     Hypothyroidism     Irritable bowel syndrome     Peptic ulcer disease     RA (rheumatoid arthritis)     Vitamin D deficiency      Past Surgical History:   Procedure Laterality Date    COLONOSCOPY  6/15/2015    Dr. lepe: colonic spasm consistent with IBS, otherwise normal findings; repeat in 5 years for surveillance due to family history of colon cancer in father    COLONOSCOPY N/A 6/15/2015    Performed by [facility]pe Jr[facility] at [facility]    COLONOSCOPY W/ POLYPECTOMY  2010? 2011?  (Stevie)    Benign polyps removed.    EGD (ESOPHAGOGASTRODUODENOSCOPY) N/A 1/2/2019    Performed by [facility] Jr[facility]    ESOPHAGOGASTRODUODENOSCOPY  early 2000s??    ESOPHAGOGASTRODUODENOSCOPY (EGD) N/A 10/3/2017    Performed by [facility]    ESOPHAGOGASTRODUODENOSCOPY (EGD) N/A 4/15/2016    Per[facility] by [facility] Jr[facility]    ESOPHAGOGASTRODUODENOSCOPY (EGD) N/A 5/13/2015    Performed by [facility] Jr[facility]    ESOPHAGOSCOPY W/ DILATION  5/13/2015  Ryan    Non-erosive esophageal reflux (NERD) disease?.  Dysphagia, esophageal spasm?   Esophagus dilated, 51 Fr.  Antral erythema.  DAYNA Test  Negative.    HYSTERECTOMY      JOINT REPLACEMENT      right and left hip  r-5yrs  l-1yr  "ago    SMALL BOWEL ENDOSCOPY-UPPER N/A 4/15/2016    Per[facility]    TOTAL HIP ARTHROPLASTY Right 2015    UPPER GASTROINTESTINAL ENDOSCOPY  2015    UPPER GASTROINTESTINAL ENDOSCOPY      dilation    UPPER GASTROINTESTINAL ENDOSCOPY  04/15/2016    Dr. Davis    UPPER GASTROINTESTINAL ENDOSCOPY  10/03/2017    Dr. Davis    UPPER GASTROINTESTINAL ENDOSCOPY  2019    Dr. Davis: NERD?, antritis; biopsy: "Antral mucosa with reactive/regenerative changes and focal active inflammation", negative for h pylori; duodenum unremarkable     Family History   Problem Relation Age of Onset    Rheum arthritis Mother     Arthritis Mother         rheumatoid arthiritis    Lupus Sister     Hypertension Sister     Arthritis Sister         rheumatoid arthritis    Macular degeneration Sister     Arthritis Father         rheumatoid arthritis    Colon cancer Father         unsure of age of diagnosis,  at 85 years old    Cataracts Father     Breast cancer Neg Hx     Crohn's disease Neg Hx     Ulcerative colitis Neg Hx     Stomach cancer Neg Hx     Esophageal cancer Neg Hx     Amblyopia Neg Hx     Blindness Neg Hx     Diabetes Neg Hx     Glaucoma Neg Hx     Retinal detachment Neg Hx     Strabismus Neg Hx     Stroke Neg Hx     Thyroid disease Neg Hx      Wt Readings from Last 10 Encounters:   19 103.3 kg (227 lb 11.8 oz)   19 104.7 kg (230 lb 13.2 oz)   19 105.7 kg (233 lb 0.4 oz)   19 105.1 kg (231 lb 11.3 oz)   19 105.4 kg (232 lb 5.8 oz)   19 104.3 kg (230 lb)   18 105.1 kg (231 lb 9.6 oz)   18 104.9 kg (231 lb 4.2 oz)   18 101.7 kg (224 lb 3.3 oz)   10/25/18 102.6 kg (226 lb 3.1 oz)     Lab Results   Component Value Date    WBC 5.77 2019    HGB 10.7 (L) 2019    HCT 34.5 (L) 2019    MCV 84 2019     2019     Lab Results   Component Value Date    IRON 98 2017    TIBC 404 " 06/27/2017    FERRITIN 85 01/17/2012     Lab Results   Component Value Date    OCCULTBLOOD Positive (A) 11/23/2018    OCCULTBLOOD Positive (A) 11/23/2018    OCCULTBLOOD Negative 11/23/2018     CMP  Sodium   Date Value Ref Range Status   04/29/2019 141 136 - 145 mmol/L Final     Potassium   Date Value Ref Range Status   04/29/2019 4.5 3.5 - 5.1 mmol/L Final     Chloride   Date Value Ref Range Status   04/29/2019 107 95 - 110 mmol/L Final     CO2   Date Value Ref Range Status   04/29/2019 26 23 - 29 mmol/L Final     Glucose   Date Value Ref Range Status   04/29/2019 95 70 - 110 mg/dL Final     BUN, Bld   Date Value Ref Range Status   04/29/2019 13 8 - 23 mg/dL Final     Creatinine   Date Value Ref Range Status   04/29/2019 0.7 0.5 - 1.4 mg/dL Final     Calcium   Date Value Ref Range Status   04/29/2019 9.9 8.7 - 10.5 mg/dL Final     Total Protein   Date Value Ref Range Status   04/29/2019 7.0 6.0 - 8.4 g/dL Final     Albumin   Date Value Ref Range Status   04/29/2019 3.4 (L) 3.5 - 5.2 g/dL Final     Total Bilirubin   Date Value Ref Range Status   04/29/2019 0.2 0.1 - 1.0 mg/dL Final     Comment:     For infants and newborns, interpretation of results should be based  on gestational age, weight and in agreement with clinical  observations.  Premature Infant recommended reference ranges:  Up to 24 hours.............<8.0 mg/dL  Up to 48 hours............<12.0 mg/dL  3-5 days..................<15.0 mg/dL  6-29 days.................<15.0 mg/dL       Alkaline Phosphatase   Date Value Ref Range Status   04/29/2019 130 55 - 135 U/L Final     AST   Date Value Ref Range Status   04/29/2019 18 10 - 40 U/L Final     ALT   Date Value Ref Range Status   04/29/2019 13 10 - 44 U/L Final     Anion Gap   Date Value Ref Range Status   04/29/2019 8 8 - 16 mmol/L Final     eGFR if    Date Value Ref Range Status   04/29/2019 >60.0 >60 mL/min/1.73 m^2 Final     eGFR if non    Date Value Ref Range Status  "  04/29/2019 >60.0 >60 mL/min/1.73 m^2 Final     Comment:     Calculation used to obtain the estimated glomerular filtration  rate (eGFR) is the CKD-EPI equation.        Lab Results   Component Value Date    LIPASE 44 03/07/2016     Lab Results   Component Value Date    AMYLASE 72 03/07/2016     Lab Results   Component Value Date    TSH 2.733 09/21/2018     Lab Results   Component Value Date    NSLKKIVR23 >2000 (H) 09/25/2017 9/25/17 magnesium level WNL    Reviewed prior medical records including radiology report of 3/22/16 UGI with SB (severe GERD and esophageal dysmotility), 3/1/16 abdominal ultrasound; 7/13/2011 ct abdomen pelvis; & endoscopy history (see surgical history).    6/15/15 Colonoscopy was reviewed and procedure report states:   " Impression: - Mild colonic spasm consistent with irritable bowel   syndrome.  - The examination was otherwise normal.  - The examined portion of the ileum was normal.  - No specimens collected.  Recommendation: - Discharge patient to home.  - High fiber diet.  - Take a PROBIOTIC, such as a carton of GREEK YOGURT   (Chobani or Oikos, or Activia or Dannon); or tablets   of ALIGN or CULTURELLE or NEW-Q (all   non-prescription), every day for a month.  - Repeat colonoscopy in 6 years for screening   purposes.  - Use Bentyl (dicyclomine) 10 mg PO QID 30 min AC.  - Continue present medications.  - Patient has a contact number available for   emergencies. The signs and symptoms of potential   delayed complications were discussed with the   patient. Return to normal activities tomorrow.   Written discharge instructions were provided to the   patient.  - Return to normal activities tomorrow. ".    Objective:      Physical Exam   Constitutional: She is oriented to person, place, and time. She appears well-developed and well-nourished. No distress.   Patient is in a wheelchair   HENT:   Mouth/Throat: Oropharynx is clear and moist and mucous membranes are normal. No oral lesions. " No oropharyngeal exudate.   Eyes: Pupils are equal, round, and reactive to light. Conjunctivae are normal. No scleral icterus.   Pulmonary/Chest: Effort normal and breath sounds normal. No respiratory distress. She has no wheezes. She has no rhonchi.   Abdominal: Soft. Normal appearance and bowel sounds are normal. She exhibits no distension, no abdominal bruit and no mass. There is no tenderness. There is no rigidity, no rebound, no guarding, no tenderness at McBurney's point and negative Woodard's sign.   Neurological: She is alert and oriented to person, place, and time.   Skin: Skin is warm and dry. No rash noted. She is not diaphoretic. No erythema. No pallor.   Non-jaundiced   Psychiatric: She has a normal mood and affect. Her behavior is normal.   Nursing note and vitals reviewed.      Assessment:       1. Anemia, unspecified type    2. NSAID long-term use    3. Occult blood positive stool    4. Gastroesophageal reflux disease without esophagitis    5. History of gastritis        Plan:       Anemia, unspecified type & Occult blood positive stool  - schedule Colonoscopy, discussed procedure with the patient, patient verbalized understanding  - discussed with patient the different ways that anemia occurs: blood loss (such as from the gi tract), the body is not making enough, or the body is breaking down the rbcs too quickly; recommend EGD and colonoscopy to further evaluate gi tract for possible blood loss and pending results of endoscopies, possible UGI with Small Bowel Follow Through/video capsule study  -follow-up with PCP and/or hematology for continued evaluation and management    Gastroesophageal reflux disease without esophagitis & History of gastritis  -  DISCONTINUE   sucralfate (CARAFATE) THERAPY COMPLETED  -  REFILL   dexlansoprazole (DEXILANT) 60 mg capsule; Take 1 capsule (60 mg total) by mouth once daily.  Dispense: 30 capsule; Refill: 11, take in the morning before breakfast, discussed about  possible long term use of medication, pt verbalized understanding and wants to continue this medication at current dosage   -discussed the different types of medications used to treat reflux and how to use them, antacids can be used PRN for breakthrough heartburn symptoms by reducing stomach acid that is already produced, H2 blockers (zantac) work by limiting the amount acid production, & PPI's work to block acid production and are taken daily, patient verbalized understanding.  - continue zantac 300 mg once daily as directed  - continue lifestyle modifications to help control reflux including: avoid large meals, avoid eating within 2-3 hours of bedtime (avoid late night eating & lying down soon after eating), elevate head of bed if nocturnal symptoms are present, & weight loss.   - avoid known foods which trigger reflux symptoms & to minimize/avoid high-fat foods, chocolate, caffeine, citrus, alcohol, & tomato products.  - avoid/limit use of NSAID's, since they can cause GI upset, bleeding, and/or ulcers. If needed, take with food.  - recommend annual monitoring with blood work to include CMP, CBC, vitamin B12, and magnesium; complete B12 and magnesium levels as previously ordered    NSAID long-term use  - avoid/minimize use of NSAIDs- since they can cause GI upset, bleeding and/or ulcers. If NSAID must be taken, recommend take with food.    Anticoagulant long-term use  - informed patient that the anticoagulant(s) will likely need to be held for endoscopy, nurse will confirm with cardiologist/PCP.    Follow up in about 1 month (around 6/16/2019), or if symptoms worsen or fail to improve.    If no improvement in symptoms or symptoms worsen, call/follow-up at clinic or go to ER.

## 2019-05-16 NOTE — PATIENT INSTRUCTIONS

## 2019-05-17 ENCOUNTER — TELEPHONE (OUTPATIENT)
Dept: GASTROENTEROLOGY | Facility: CLINIC | Age: 64
End: 2019-05-17

## 2019-05-17 RX ORDER — FERROUS SULFATE 325(65) MG
325 TABLET ORAL
Qty: 30 TABLET | Refills: 2 | Status: SHIPPED | OUTPATIENT
Start: 2019-05-17

## 2019-05-17 NOTE — TELEPHONE ENCOUNTER
Spoke with pt and informed of results and recommendations per Radha Gann NP. Pt verbalized understanding.

## 2019-05-17 NOTE — TELEPHONE ENCOUNTER
----- Message from PEGGY Wells sent at 5/17/2019  7:56 AM CDT -----  Please call to inform & review the results with the patient- blood work showed normal magnesium and vitamin B12 levels.  Continue with previous recommendations.  Thanks,  Saniya WOODS-C

## 2019-06-06 RX ORDER — CELECOXIB 200 MG/1
CAPSULE ORAL
Qty: 60 CAPSULE | Refills: 5 | Status: SHIPPED | OUTPATIENT
Start: 2019-06-06 | End: 2019-08-06

## 2019-06-10 ENCOUNTER — OFFICE VISIT (OUTPATIENT)
Dept: FAMILY MEDICINE | Facility: CLINIC | Age: 64
End: 2019-06-10
Payer: MEDICARE

## 2019-06-10 VITALS
DIASTOLIC BLOOD PRESSURE: 78 MMHG | WEIGHT: 232.81 LBS | HEIGHT: 64 IN | SYSTOLIC BLOOD PRESSURE: 110 MMHG | HEART RATE: 72 BPM | BODY MASS INDEX: 39.75 KG/M2

## 2019-06-10 DIAGNOSIS — I10 HYPERTENSION, UNSPECIFIED TYPE: ICD-10-CM

## 2019-06-10 DIAGNOSIS — Z87.19 HISTORY OF GASTRITIS: Primary | ICD-10-CM

## 2019-06-10 DIAGNOSIS — K21.9 GASTROESOPHAGEAL REFLUX DISEASE WITHOUT ESOPHAGITIS: ICD-10-CM

## 2019-06-10 PROCEDURE — 3078F PR MOST RECENT DIASTOLIC BLOOD PRESSURE < 80 MM HG: ICD-10-PCS | Mod: HCNC,CPTII,S$GLB, | Performed by: PHYSICIAN ASSISTANT

## 2019-06-10 PROCEDURE — 99999 PR PBB SHADOW E&M-EST. PATIENT-LVL III: ICD-10-PCS | Mod: PBBFAC,HCNC,, | Performed by: PHYSICIAN ASSISTANT

## 2019-06-10 PROCEDURE — 99214 PR OFFICE/OUTPT VISIT, EST, LEVL IV, 30-39 MIN: ICD-10-PCS | Mod: HCNC,S$GLB,, | Performed by: PHYSICIAN ASSISTANT

## 2019-06-10 PROCEDURE — 3074F PR MOST RECENT SYSTOLIC BLOOD PRESSURE < 130 MM HG: ICD-10-PCS | Mod: HCNC,CPTII,S$GLB, | Performed by: PHYSICIAN ASSISTANT

## 2019-06-10 PROCEDURE — 3078F DIAST BP <80 MM HG: CPT | Mod: HCNC,CPTII,S$GLB, | Performed by: PHYSICIAN ASSISTANT

## 2019-06-10 PROCEDURE — 3008F PR BODY MASS INDEX (BMI) DOCUMENTED: ICD-10-PCS | Mod: HCNC,CPTII,S$GLB, | Performed by: PHYSICIAN ASSISTANT

## 2019-06-10 PROCEDURE — 3074F SYST BP LT 130 MM HG: CPT | Mod: HCNC,CPTII,S$GLB, | Performed by: PHYSICIAN ASSISTANT

## 2019-06-10 PROCEDURE — 3008F BODY MASS INDEX DOCD: CPT | Mod: HCNC,CPTII,S$GLB, | Performed by: PHYSICIAN ASSISTANT

## 2019-06-10 PROCEDURE — 99999 PR PBB SHADOW E&M-EST. PATIENT-LVL III: CPT | Mod: PBBFAC,HCNC,, | Performed by: PHYSICIAN ASSISTANT

## 2019-06-10 PROCEDURE — 99214 OFFICE O/P EST MOD 30 MIN: CPT | Mod: HCNC,S$GLB,, | Performed by: PHYSICIAN ASSISTANT

## 2019-06-10 RX ORDER — LOSARTAN POTASSIUM 25 MG/1
25 TABLET ORAL DAILY
Qty: 30 TABLET | Refills: 3 | Status: SHIPPED | OUTPATIENT
Start: 2019-06-10

## 2019-06-10 RX ORDER — DEXLANSOPRAZOLE 60 MG/1
1 CAPSULE, DELAYED RELEASE ORAL DAILY
Qty: 30 CAPSULE | Refills: 11 | Status: SHIPPED | OUTPATIENT
Start: 2019-06-10 | End: 2020-06-09

## 2019-06-10 NOTE — MEDICAL/APP STUDENT
Subjective:       Patient ID: Vida Ornelas is a 64 y.o. female.    Chief Complaint: Hypertension (follow up)    HPI   Pt presents today for a 6 week follow up.  Reports stopped celebrex and abdominal pain has since resolved.  Pt has no complaints today.  Denies chest pain, shortness of breath, abdominal pain, calf swelling, warmth, or pain.  Denies headaches or any visual disturbances.     On 5/23 pt had left total hip replacement, will be starting physical therapy tomorrow, went to the surgeon last week and reports wound is healing well.    Also pt requests refills for losartan and dexilant.      Review of Systems   Constitutional: Negative for appetite change, fatigue and fever.   HENT: Negative for congestion, rhinorrhea and sore throat.    Eyes: Negative for visual disturbance.   Respiratory: Negative for cough, chest tightness, shortness of breath and wheezing.    Cardiovascular: Negative for chest pain, palpitations and leg swelling.   Gastrointestinal: Negative for abdominal pain, constipation, diarrhea, nausea and vomiting.   Genitourinary: Negative for dysuria.   Neurological: Negative for dizziness, weakness, light-headedness, numbness and headaches.       Objective:      Physical Exam   Constitutional: She is oriented to person, place, and time. She appears well-developed and well-nourished. No distress.   HENT:   Head: Normocephalic and atraumatic.   Eyes: Pupils are equal, round, and reactive to light. EOM are normal.   Neck: No JVD present. No tracheal deviation present.   Cardiovascular: Normal rate, regular rhythm, normal heart sounds and intact distal pulses.   No murmur heard.  Pulmonary/Chest: Effort normal and breath sounds normal. No respiratory distress. She has no wheezes.   Abdominal: Soft. Bowel sounds are normal. There is no tenderness.   Neurological: She is alert and oriented to person, place, and time.   Skin: Skin is warm and dry. No erythema.   Psychiatric: She has a normal mood  and affect.       Assessment:       1. History of gastritis    2. Hypertension, unspecified type    3. Gastroesophageal reflux disease without esophagitis        Plan:

## 2019-06-10 NOTE — PROGRESS NOTES
Subjective:       Patient ID: Vida Ornelas is a 64 y.o. female    Chief Complaint: Hypertension (follow up)    HPI  Pt presents today for a 6 week follow up.  Reports stopped celebrex and abdominal pain has since resolved.  Pt has no complaints today.  Denies chest pain, shortness of breath, abdominal pain, calf swelling, warmth, or pain.  Denies headaches or any visual disturbances.      On 5/23 pt had left total hip replacement, will be starting physical therapy tomorrow, went to the surgeon last week and reports wound is healing well.     Also pt requests refills for losartan and dexilant.      Review of Systems   Constitutional: Negative for appetite change, fatigue and fever.   HENT: Negative for congestion, rhinorrhea and sore throat.    Eyes: Negative for visual disturbance.   Respiratory: Negative for cough, chest tightness, shortness of breath and wheezing.    Cardiovascular: Negative for chest pain, palpitations and leg swelling.   Gastrointestinal: Negative for abdominal pain, constipation, diarrhea, nausea and vomiting.   Genitourinary: Negative for dysuria.   Neurological: Negative for dizziness, weakness, light-headedness, numbness and headaches.   Objective:   Physical Exam   Constitutional: She is oriented to person, place, and time. She appears well-developed and well-nourished. No distress.   HENT:   Head: Normocephalic and atraumatic.   Eyes: Pupils are equal, round, and reactive to light. EOM are normal.   Neck: No JVD present. No tracheal deviation present.   Cardiovascular: Normal rate, regular rhythm, normal heart sounds and intact distal pulses.   No murmur heard.  Pulmonary/Chest: Effort normal and breath sounds normal. No respiratory distress. She has no wheezes.   Abdominal: Soft. Bowel sounds are normal. There is no tenderness.   Neurological: She is alert and oriented to person, place, and time.   Skin: Skin is warm and dry. No erythema.   Psychiatric: She has a normal mood and  affect.   Assessment:       1. History of gastritis  dexlansoprazole (DEXILANT) 60 mg capsule   2. Hypertension, unspecified type  losartan (COZAAR) 25 MG tablet   3. Gastroesophageal reflux disease without esophagitis  dexlansoprazole (DEXILANT) 60 mg capsule        Plan:       History of gastritis  REFILL dexlansoprazole (DEXILANT) 60 mg capsule; Take 1 capsule (60 mg total) by mouth once daily.  Dispense: 30 capsule; Refill: 11    Hypertension, unspecified type  -    REFILL losartan (COZAAR) 25 MG tablet; Take 1 tablet (25 mg total) by mouth once daily.  Dispense: 30 tablet; Refill: 3    Gastroesophageal reflux disease without esophagitis  -    REFILL dexlansoprazole (DEXILANT) 60 mg capsule; Take 1 capsule (60 mg total) by mouth once daily.  Dispense: 30 capsule; Refill: 11

## 2019-07-01 RX ORDER — LEFLUNOMIDE 20 MG/1
20 TABLET ORAL DAILY
Qty: 30 TABLET | Refills: 3 | Status: SHIPPED | OUTPATIENT
Start: 2019-07-01 | End: 2019-08-06 | Stop reason: SDUPTHER

## 2019-07-01 NOTE — TELEPHONE ENCOUNTER
----- Message from Everett De La Garza sent at 7/1/2019 12:12 PM CDT -----  Contact: Patient  Type:  RX Refill Request    Who Called:  Patient  Refill or New Rx:  Refill  RX Name and Strength:  leflunomide (ARAVA) 20 MG Tab  How is the patient currently taking it? (ex. 1XDay):  x1 daily  Is this a 30 day or 90 day RX:  30  Preferred Pharmacy with phone number:    [place], [place] - [address] [address]  [address] [address]  [facility] [address]  Phone: [phone] Fax: [phone]  Local or Mail Order:  Local  Ordering Provider:  Chantale Wynn Call Back Number:  [phone]  Additional Information:  Patient was taken off arthritis medication while in [place] for blood clot and is requesting to get medication filled. Please advise when complete

## 2019-07-15 ENCOUNTER — LAB VISIT (OUTPATIENT)
Dept: LAB | Facility: HOSPITAL | Age: 64
End: 2019-07-15
Attending: SURGERY
Payer: MEDICARE

## 2019-07-15 DIAGNOSIS — Z51.81 ENCOUNTER FOR THERAPEUTIC DRUG MONITORING: ICD-10-CM

## 2019-07-15 DIAGNOSIS — Z86.718 PERSONAL HISTORY OF VENOUS THROMBOSIS AND EMBOLISM: Primary | ICD-10-CM

## 2019-07-15 LAB
BASOPHILS # BLD AUTO: 0.05 K/UL (ref 0–0.2)
BASOPHILS NFR BLD: 0.9 % (ref 0–1.9)
DIFFERENTIAL METHOD: ABNORMAL
EOSINOPHIL # BLD AUTO: 0.4 K/UL (ref 0–0.5)
EOSINOPHIL NFR BLD: 6.3 % (ref 0–8)
ERYTHROCYTE [DISTWIDTH] IN BLOOD BY AUTOMATED COUNT: 15.2 % (ref 11.5–14.5)
HCT VFR BLD AUTO: 36.7 % (ref 37–48.5)
HGB BLD-MCNC: 10.9 G/DL (ref 12–16)
IMM GRANULOCYTES # BLD AUTO: 0.03 K/UL (ref 0–0.04)
IMM GRANULOCYTES NFR BLD AUTO: 0.5 % (ref 0–0.5)
LYMPHOCYTES # BLD AUTO: 1.9 K/UL (ref 1–4.8)
LYMPHOCYTES NFR BLD: 32.3 % (ref 18–48)
MCH RBC QN AUTO: 26.7 PG (ref 27–31)
MCHC RBC AUTO-ENTMCNC: 29.7 G/DL (ref 32–36)
MCV RBC AUTO: 90 FL (ref 82–98)
MONOCYTES # BLD AUTO: 0.5 K/UL (ref 0.3–1)
MONOCYTES NFR BLD: 8.7 % (ref 4–15)
NEUTROPHILS # BLD AUTO: 3 K/UL (ref 1.8–7.7)
NEUTROPHILS NFR BLD: 51.3 % (ref 38–73)
NRBC BLD-RTO: 0 /100 WBC
PLATELET # BLD AUTO: 214 K/UL (ref 150–350)
PMV BLD AUTO: 13 FL (ref 9.2–12.9)
RBC # BLD AUTO: 4.09 M/UL (ref 4–5.4)
WBC # BLD AUTO: 5.76 K/UL (ref 3.9–12.7)

## 2019-07-15 PROCEDURE — 85730 THROMBOPLASTIN TIME PARTIAL: CPT | Mod: HCNC

## 2019-07-15 PROCEDURE — 36415 COLL VENOUS BLD VENIPUNCTURE: CPT | Mod: HCNC,PO

## 2019-07-15 PROCEDURE — 80048 BASIC METABOLIC PNL TOTAL CA: CPT | Mod: HCNC

## 2019-07-15 PROCEDURE — 85610 PROTHROMBIN TIME: CPT | Mod: HCNC

## 2019-07-15 PROCEDURE — 85025 COMPLETE CBC W/AUTO DIFF WBC: CPT | Mod: HCNC

## 2019-07-16 ENCOUNTER — CLINICAL SUPPORT (OUTPATIENT)
Dept: CARDIOLOGY | Facility: CLINIC | Age: 64
End: 2019-07-16
Payer: MEDICARE

## 2019-07-16 DIAGNOSIS — Z01.818 PRE-OP TESTING: ICD-10-CM

## 2019-07-16 DIAGNOSIS — Z01.818 PRE-OP TESTING: Primary | ICD-10-CM

## 2019-07-16 LAB
ANION GAP SERPL CALC-SCNC: 14 MMOL/L (ref 8–16)
APTT BLDCRRT: 29.6 SEC (ref 21–32)
BUN SERPL-MCNC: 19 MG/DL (ref 8–23)
CALCIUM SERPL-MCNC: 10.4 MG/DL (ref 8.7–10.5)
CHLORIDE SERPL-SCNC: 102 MMOL/L (ref 95–110)
CO2 SERPL-SCNC: 20 MMOL/L (ref 23–29)
CREAT SERPL-MCNC: 0.8 MG/DL (ref 0.5–1.4)
EST. GFR  (AFRICAN AMERICAN): >60 ML/MIN/1.73 M^2
EST. GFR  (NON AFRICAN AMERICAN): >60 ML/MIN/1.73 M^2
GLUCOSE SERPL-MCNC: 82 MG/DL (ref 70–110)
INR PPP: 1 (ref 0.8–1.2)
POTASSIUM SERPL-SCNC: 4.3 MMOL/L (ref 3.5–5.1)
PROTHROMBIN TIME: 10.7 SEC (ref 9–12.5)
SODIUM SERPL-SCNC: 136 MMOL/L (ref 136–145)

## 2019-07-16 PROCEDURE — 93010 ELECTROCARDIOGRAM REPORT: CPT | Mod: HCNC,S$GLB,, | Performed by: NUCLEAR MEDICINE

## 2019-07-16 PROCEDURE — 93010 EKG 12-LEAD: ICD-10-PCS | Mod: HCNC,S$GLB,, | Performed by: NUCLEAR MEDICINE

## 2019-08-02 ENCOUNTER — TELEPHONE (OUTPATIENT)
Dept: FAMILY MEDICINE | Facility: CLINIC | Age: 64
End: 2019-08-02

## 2019-08-02 ENCOUNTER — LAB VISIT (OUTPATIENT)
Dept: LAB | Facility: HOSPITAL | Age: 64
End: 2019-08-02
Attending: FAMILY MEDICINE
Payer: MEDICARE

## 2019-08-02 DIAGNOSIS — R30.0 DYSURIA: ICD-10-CM

## 2019-08-02 DIAGNOSIS — R30.0 DYSURIA: Primary | ICD-10-CM

## 2019-08-02 LAB
BACTERIA #/AREA URNS HPF: ABNORMAL /HPF
BILIRUB UR QL STRIP: NEGATIVE
CLARITY UR: CLEAR
COLOR UR: YELLOW
GLUCOSE UR QL STRIP: NEGATIVE
HGB UR QL STRIP: ABNORMAL
KETONES UR QL STRIP: NEGATIVE
LEUKOCYTE ESTERASE UR QL STRIP: ABNORMAL
MICROSCOPIC COMMENT: ABNORMAL
NITRITE UR QL STRIP: NEGATIVE
PH UR STRIP: 6 [PH] (ref 5–8)
PROT UR QL STRIP: NEGATIVE
RBC #/AREA URNS HPF: 2 /HPF (ref 0–4)
SP GR UR STRIP: 1.02 (ref 1–1.03)
SQUAMOUS #/AREA URNS HPF: 2 /HPF
URN SPEC COLLECT METH UR: ABNORMAL
WBC #/AREA URNS HPF: 30 /HPF (ref 0–5)

## 2019-08-02 PROCEDURE — 87088 URINE BACTERIA CULTURE: CPT | Mod: HCNC

## 2019-08-02 PROCEDURE — 81000 URINALYSIS NONAUTO W/SCOPE: CPT | Mod: HCNC,PO

## 2019-08-02 PROCEDURE — 87186 SC STD MICRODIL/AGAR DIL: CPT | Mod: HCNC

## 2019-08-02 PROCEDURE — 87077 CULTURE AEROBIC IDENTIFY: CPT | Mod: HCNC

## 2019-08-02 PROCEDURE — 87086 URINE CULTURE/COLONY COUNT: CPT | Mod: HCNC

## 2019-08-02 NOTE — TELEPHONE ENCOUNTER
Pt. notified of urinalysis orders in and to come at her earliest convenience. Pt. Verbalized understanding

## 2019-08-02 NOTE — TELEPHONE ENCOUNTER
----- Message from Laz Morrow sent at 8/2/2019  8:56 AM CDT -----  Contact: [phone]  Patient requesting prescription to treat bladder infection.      Patient will be using   [facility] [place]  [address]  [id number]  [place] [address]  Phone: [phone] Fax: [phone]    Please call patient at [phone].     Thanks!

## 2019-08-02 NOTE — TELEPHONE ENCOUNTER
Pt. C/o of constant urination but not burning or pain. Wants something for symptoms. Can she just drop off urine to be analyzed?  Please advise

## 2019-08-04 LAB — BACTERIA UR CULT: ABNORMAL

## 2019-08-05 DIAGNOSIS — N30.00 ACUTE CYSTITIS WITHOUT HEMATURIA: Primary | ICD-10-CM

## 2019-08-05 RX ORDER — NITROFURANTOIN 25; 75 MG/1; MG/1
100 CAPSULE ORAL 2 TIMES DAILY
Qty: 14 CAPSULE | Refills: 0 | Status: SHIPPED | OUTPATIENT
Start: 2019-08-05 | End: 2019-08-12

## 2019-08-06 ENCOUNTER — TELEPHONE (OUTPATIENT)
Dept: FAMILY MEDICINE | Facility: CLINIC | Age: 64
End: 2019-08-06

## 2019-08-06 ENCOUNTER — OFFICE VISIT (OUTPATIENT)
Dept: RHEUMATOLOGY | Facility: CLINIC | Age: 64
End: 2019-08-06
Payer: MEDICARE

## 2019-08-06 VITALS
WEIGHT: 235 LBS | SYSTOLIC BLOOD PRESSURE: 111 MMHG | DIASTOLIC BLOOD PRESSURE: 67 MMHG | HEIGHT: 64 IN | BODY MASS INDEX: 40.12 KG/M2

## 2019-08-06 DIAGNOSIS — M06.9 RHEUMATOID ARTHRITIS OF HAND, UNSPECIFIED LATERALITY, UNSPECIFIED RHEUMATOID FACTOR PRESENCE: Primary | ICD-10-CM

## 2019-08-06 DIAGNOSIS — R70.0 ESR RAISED: ICD-10-CM

## 2019-08-06 DIAGNOSIS — B37.7 CANDIDEMIA: ICD-10-CM

## 2019-08-06 DIAGNOSIS — M16.9 OSTEOARTHROSIS OF HIP: ICD-10-CM

## 2019-08-06 DIAGNOSIS — M16.0 PRIMARY OSTEOARTHRITIS OF BOTH HIPS: ICD-10-CM

## 2019-08-06 PROCEDURE — 3078F PR MOST RECENT DIASTOLIC BLOOD PRESSURE < 80 MM HG: ICD-10-PCS | Mod: HCNC,CPTII,S$GLB, | Performed by: INTERNAL MEDICINE

## 2019-08-06 PROCEDURE — 99214 OFFICE O/P EST MOD 30 MIN: CPT | Mod: HCNC,S$GLB,, | Performed by: INTERNAL MEDICINE

## 2019-08-06 PROCEDURE — 3074F SYST BP LT 130 MM HG: CPT | Mod: HCNC,CPTII,S$GLB, | Performed by: INTERNAL MEDICINE

## 2019-08-06 PROCEDURE — 3008F BODY MASS INDEX DOCD: CPT | Mod: HCNC,CPTII,S$GLB, | Performed by: INTERNAL MEDICINE

## 2019-08-06 PROCEDURE — 99214 PR OFFICE/OUTPT VISIT, EST, LEVL IV, 30-39 MIN: ICD-10-PCS | Mod: HCNC,S$GLB,, | Performed by: INTERNAL MEDICINE

## 2019-08-06 PROCEDURE — 99999 PR PBB SHADOW E&M-EST. PATIENT-LVL III: CPT | Mod: PBBFAC,HCNC,, | Performed by: INTERNAL MEDICINE

## 2019-08-06 PROCEDURE — 3074F PR MOST RECENT SYSTOLIC BLOOD PRESSURE < 130 MM HG: ICD-10-PCS | Mod: HCNC,CPTII,S$GLB, | Performed by: INTERNAL MEDICINE

## 2019-08-06 PROCEDURE — 3008F PR BODY MASS INDEX (BMI) DOCUMENTED: ICD-10-PCS | Mod: HCNC,CPTII,S$GLB, | Performed by: INTERNAL MEDICINE

## 2019-08-06 PROCEDURE — 99999 PR PBB SHADOW E&M-EST. PATIENT-LVL III: ICD-10-PCS | Mod: PBBFAC,HCNC,, | Performed by: INTERNAL MEDICINE

## 2019-08-06 PROCEDURE — 3078F DIAST BP <80 MM HG: CPT | Mod: HCNC,CPTII,S$GLB, | Performed by: INTERNAL MEDICINE

## 2019-08-06 RX ORDER — DICLOFENAC SODIUM 10 MG/G
2 GEL TOPICAL 4 TIMES DAILY
Qty: 100 G | Refills: 3 | Status: SHIPPED | OUTPATIENT
Start: 2019-08-06

## 2019-08-06 RX ORDER — LEFLUNOMIDE 20 MG/1
20 TABLET ORAL DAILY
Qty: 30 TABLET | Refills: 6 | Status: SHIPPED | OUTPATIENT
Start: 2019-08-06

## 2019-08-06 RX ORDER — CLOTRIMAZOLE AND BETAMETHASONE DIPROPIONATE 10; .64 MG/G; MG/G
CREAM TOPICAL 2 TIMES DAILY
Qty: 45 G | Refills: 10 | Status: SHIPPED | OUTPATIENT
Start: 2019-08-06

## 2019-08-06 RX ORDER — FLUCONAZOLE 150 MG/1
150 TABLET ORAL DAILY
Qty: 3 TABLET | Refills: 3 | Status: SHIPPED | OUTPATIENT
Start: 2019-08-06 | End: 2019-08-09

## 2019-08-06 RX ORDER — SUCRALFATE 1 G/1
TABLET ORAL
Refills: 11 | COMMUNITY
Start: 2019-07-11

## 2019-08-06 NOTE — PROGRESS NOTES
Subjective:       Patient ID: Vida Ornelas is a 64 y.o. female.    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 Pain is located in multiple joints, both shoulder(s), both elbow(s), both wrist(s), both MCP(s): 1st, 2nd, 3rd, 4th and 5th, both PIP(s): 1st, 2nd, 3rd, 4th and 5th, both DIP(s): 1st and 2nd, both hip(s), both knee(s) and both MTP(s): 1st, 2nd, 3rd, 4th and 5th, is described as aching, pulsating, shooting and throbbing, and is constant, moderate .  Associated symptoms include: crepitation, decreased range of motion, edema, effusion, tenderness and warmth.  She had a filter placed and now removed    Review of Systems   Constitutional: Positive for fatigue. Negative for activity change, appetite change and unexpected weight change.   HENT: Negative for dental problem, ear discharge, ear pain, facial swelling, mouth sores, nosebleeds, postnasal drip, rhinorrhea, sinus pressure, sneezing, tinnitus and voice change.    Eyes: Negative for photophobia, pain, discharge, redness and itching.   Respiratory: Negative for apnea, chest tightness, shortness of breath and wheezing.    Cardiovascular: Negative for palpitations and leg swelling.   Gastrointestinal: Negative for abdominal distention, constipation and diarrhea.   Endocrine: Negative for cold intolerance, heat intolerance, polydipsia and polyuria.   Genitourinary: Negative for decreased urine volume, difficulty urinating, flank pain, frequency, hematuria and urgency.   Musculoskeletal: Positive for back pain and neck stiffness. Negative for gait problem.   Skin: Negative for pallor and wound.   Allergic/Immunologic: Negative for immunocompromised state.   Neurological: Negative for dizziness and tremors.   Hematological: Negative for adenopathy. Does not bruise/bleed easily.  "  Psychiatric/Behavioral: Negative for sleep disturbance. The patient is not nervous/anxious.          Objective:     /67 (BP Location: Left arm, Patient Position: Sitting, BP Method: Small (Automatic))   Ht 5' 4" (1.626 m)   Wt 106.6 kg (235 lb)   BMI 40.34 kg/m²      Physical Exam   Nursing note and vitals reviewed.  Constitutional: She is oriented to person, place, and time. No distress.   HENT:   Head: Normocephalic and atraumatic.   Mouth/Throat: Oropharynx is clear and moist.   Eyes: EOM are normal. Pupils are equal, round, and reactive to light.   Neck: Neck supple. No thyromegaly present.   Cardiovascular: Normal rate, regular rhythm and normal heart sounds.  Exam reveals no gallop and no friction rub.    No murmur heard.  Pulmonary/Chest: Breath sounds normal. She has no wheezes. She has no rales. She exhibits no tenderness.   Abdominal: There is no tenderness. There is no rebound and no guarding.       Right Side Rheumatological Exam     The patient is tender to palpation of the shoulder, wrist, knee, 1st PIP, 1st MCP, 2nd PIP, 2nd MCP, 3rd PIP, 3rd MCP, 4th PIP, 4th MCP, 5th PIP and 5th MCP    She has swelling of the shoulder, elbow, wrist and knee    Shoulder Exam   Tenderness Location: no tenderness    Range of Motion   Active abduction: abnormal   Adduction: abnormal  Sensation: normal    Knee Exam   Patellofemoral Crepitus: positive  Effusion: positive  Sensation: normal    Hip Exam   Tenderness Location: posterior and anterior    Range of Motion   External rotation: abnormal   Internal rotation: abnormal   Sensation: normal    Elbow/Wrist Exam   Tenderness Location: no tenderness  Sensation: normal    Left Side Rheumatological Exam     Examination finds the elbow normal.    The patient is tender to palpation of the wrist, knee, 1st PIP, 1st MCP, 2nd PIP, 2nd MCP, 3rd PIP, 3rd MCP, 4th PIP, 4th MCP, 5th PIP and 5th MCP.    She has swelling of the shoulder, wrist and knee    Shoulder Exam "   Tenderness Location: no tenderness    Range of Motion   Active abduction: abnormal   Sensation: normal    Knee Exam     Patellofemoral Crepitus: negative  Effusion: negative  Sensation: normal    Hip Exam   Tenderness Location: no tenderness  Sensation: normal    Elbow/Wrist Exam   Sensation: normal      Back/Neck Exam   Tenderness Right paramedian tenderness of the Lower C-Spine and Upper C-Spine.Left paramedian tenderness of the Lower C-Spine and Upper C-Spine.      Lymphadenopathy:     She has no cervical adenopathy.   Neurological: She is alert and oriented to person, place, and time. Gait normal.   Skin: No rash noted. No erythema. No pallor.     Psychiatric: Mood and affect normal.   Musculoskeletal: She exhibits tenderness and deformity. She exhibits no edema.          Results for orders placed or performed in visit on 08/02/19   Urine culture   Result Value Ref Range    Urine Culture, Routine ESCHERICHIA COLI  >100,000 cfu/ml   (A)        Susceptibility    Escherichia coli - CULTURE, URINE     Amp/Sulbactam <=8/4 Sensitive mcg/mL     Ampicillin <=8 Sensitive mcg/mL     Amox/K Clav'ate <=8/4 Sensitive mcg/mL     Ceftriaxone <=8 Sensitive mcg/mL     Cefazolin <=8 Sensitive mcg/mL     Ciprofloxacin <=1 Sensitive mcg/mL     Cefepime <=8 Sensitive mcg/mL     Nitrofurantoin <=32 Sensitive mcg/mL     Gentamicin <=4 Sensitive mcg/mL     Levofloxacin <=2 Sensitive mcg/mL     Piperacillin/Tazo <=16 Sensitive mcg/mL     Trimeth/Sulfa >2/38 Resistant mcg/mL     Tetracycline >8 Resistant mcg/mL     Tobramycin <=4 Sensitive mcg/mL   Urinalysis   Result Value Ref Range    Specimen UA Urine, Clean Catch     Color, UA Yellow Yellow, Straw, Ángela    Appearance, UA Clear Clear    pH, UA 6.0 5.0 - 8.0    Specific Gravity, UA 1.020 1.005 - 1.030    Protein, UA Negative Negative    Glucose, UA Negative Negative    Ketones, UA Negative Negative    Bilirubin (UA) Negative Negative    Occult Blood UA 1+ (A) Negative    Nitrite, UA  Negative Negative    Leukocytes, UA 1+ (A) Negative   Urinalysis Microscopic   Result Value Ref Range    RBC, UA 2 0 - 4 /hpf    WBC, UA 30 (H) 0 - 5 /hpf    Bacteria Few (A) None-Occ /hpf    Squam Epithel, UA 2 /hpf    Microscopic Comment SEE COMMENT      Assessment:       Encounter Diagnoses   Name Primary?    Rheumatoid arthritis of hand, unspecified laterality, unspecified rheumatoid factor presence Yes    ESR raised     Osteoarthrosis of hip     Primary osteoarthritis of both hips     Candidemia          Plan:     Vida was seen today for rheumatoid arthritis.    Diagnoses and all orders for this visit:    Rheumatoid arthritis of hand, unspecified laterality, unspecified rheumatoid factor presence  -     diclofenac sodium (VOLTAREN) 1 % Gel; Apply 2 g topically 4 (four) times daily.  -     fluconazole (DIFLUCAN) 150 MG Tab; Take 1 tablet (150 mg total) by mouth once daily. for 3 days  -     clotrimazole-betamethasone 1-0.05% (LOTRISONE) cream; Apply topically 2 (two) times daily.  -     leflunomide (ARAVA) 20 MG Tab; Take 1 tablet (20 mg total) by mouth once daily.  -     CBC auto differential; Future  -     Comprehensive metabolic panel; Future  -     C-reactive protein; Future  -     Sedimentation rate; Future  -     Rheumatoid factor; Future  -     Cyclic citrul peptide antibody, IgG; Future    ESR raised  -     diclofenac sodium (VOLTAREN) 1 % Gel; Apply 2 g topically 4 (four) times daily.  -     fluconazole (DIFLUCAN) 150 MG Tab; Take 1 tablet (150 mg total) by mouth once daily. for 3 days  -     clotrimazole-betamethasone 1-0.05% (LOTRISONE) cream; Apply topically 2 (two) times daily.  -     leflunomide (ARAVA) 20 MG Tab; Take 1 tablet (20 mg total) by mouth once daily.  -     CBC auto differential; Future  -     Comprehensive metabolic panel; Future  -     C-reactive protein; Future  -     Sedimentation rate; Future  -     Rheumatoid factor; Future  -     Cyclic citrul peptide antibody, IgG;  Future    Osteoarthrosis of hip  -     diclofenac sodium (VOLTAREN) 1 % Gel; Apply 2 g topically 4 (four) times daily.  -     fluconazole (DIFLUCAN) 150 MG Tab; Take 1 tablet (150 mg total) by mouth once daily. for 3 days  -     clotrimazole-betamethasone 1-0.05% (LOTRISONE) cream; Apply topically 2 (two) times daily.  -     leflunomide (ARAVA) 20 MG Tab; Take 1 tablet (20 mg total) by mouth once daily.  -     CBC auto differential; Future  -     Comprehensive metabolic panel; Future  -     C-reactive protein; Future  -     Sedimentation rate; Future  -     Rheumatoid factor; Future  -     Cyclic citrul peptide antibody, IgG; Future    Primary osteoarthritis of both hips  -     diclofenac sodium (VOLTAREN) 1 % Gel; Apply 2 g topically 4 (four) times daily.  -     fluconazole (DIFLUCAN) 150 MG Tab; Take 1 tablet (150 mg total) by mouth once daily. for 3 days  -     clotrimazole-betamethasone 1-0.05% (LOTRISONE) cream; Apply topically 2 (two) times daily.  -     leflunomide (ARAVA) 20 MG Tab; Take 1 tablet (20 mg total) by mouth once daily.  -     CBC auto differential; Future  -     Comprehensive metabolic panel; Future  -     C-reactive protein; Future  -     Sedimentation rate; Future  -     Rheumatoid factor; Future  -     Cyclic citrul peptide antibody, IgG; Future    Candidemia  -     fluconazole (DIFLUCAN) 150 MG Tab; Take 1 tablet (150 mg total) by mouth once daily. for 3 days  -     clotrimazole-betamethasone 1-0.05% (LOTRISONE) cream; Apply topically 2 (two) times daily.  -     leflunomide (ARAVA) 20 MG Tab; Take 1 tablet (20 mg total) by mouth once daily.  -     CBC auto differential; Future  -     Comprehensive metabolic panel; Future  -     C-reactive protein; Future  -     Sedimentation rate; Future  -     Rheumatoid factor; Future  -     Cyclic citrul peptide antibody, IgG; Future    hold arava until abx finish , no steroids at this time and nsaids

## 2019-08-06 NOTE — TELEPHONE ENCOUNTER
----- Message from Brooklyn Diallo sent at 8/6/2019  1:58 PM CDT -----  Contact: self  Patient needs to reschedule 8/8 appt for wellness. Please call back at [phone] (home)

## 2019-08-16 ENCOUNTER — OFFICE VISIT (OUTPATIENT)
Dept: FAMILY MEDICINE | Facility: CLINIC | Age: 64
End: 2019-08-16
Payer: MEDICARE

## 2019-08-16 VITALS
HEART RATE: 73 BPM | DIASTOLIC BLOOD PRESSURE: 80 MMHG | BODY MASS INDEX: 41.21 KG/M2 | OXYGEN SATURATION: 97 % | SYSTOLIC BLOOD PRESSURE: 128 MMHG | WEIGHT: 241.38 LBS | HEIGHT: 64 IN

## 2019-08-16 DIAGNOSIS — M54.50 CHRONIC LOW BACK PAIN WITHOUT SCIATICA, UNSPECIFIED BACK PAIN LATERALITY: ICD-10-CM

## 2019-08-16 DIAGNOSIS — G89.29 CHRONIC LOW BACK PAIN WITHOUT SCIATICA, UNSPECIFIED BACK PAIN LATERALITY: ICD-10-CM

## 2019-08-16 DIAGNOSIS — M47.816 OSTEOARTHRITIS OF LUMBAR SPINE, UNSPECIFIED SPINAL OSTEOARTHRITIS COMPLICATION STATUS: ICD-10-CM

## 2019-08-16 DIAGNOSIS — M51.36 DDD (DEGENERATIVE DISC DISEASE), LUMBAR: ICD-10-CM

## 2019-08-16 DIAGNOSIS — E78.5 HYPERLIPIDEMIA, UNSPECIFIED HYPERLIPIDEMIA TYPE: ICD-10-CM

## 2019-08-16 DIAGNOSIS — Z00.00 ENCOUNTER FOR PREVENTIVE HEALTH EXAMINATION: Primary | ICD-10-CM

## 2019-08-16 DIAGNOSIS — I82.492 DEEP VEIN THROMBOSIS (DVT) OF OTHER VEIN OF LEFT LOWER EXTREMITY, UNSPECIFIED CHRONICITY: ICD-10-CM

## 2019-08-16 DIAGNOSIS — E55.9 VITAMIN D DEFICIENCY: ICD-10-CM

## 2019-08-16 DIAGNOSIS — E03.8 OTHER SPECIFIED HYPOTHYROIDISM: ICD-10-CM

## 2019-08-16 DIAGNOSIS — I10 ESSENTIAL HYPERTENSION: ICD-10-CM

## 2019-08-16 PROBLEM — I82.409 DVT (DEEP VENOUS THROMBOSIS): Status: ACTIVE | Noted: 2019-07-19

## 2019-08-16 PROBLEM — K27.9 PUD (PEPTIC ULCER DISEASE): Status: ACTIVE | Noted: 2019-08-16

## 2019-08-16 PROCEDURE — 3074F PR MOST RECENT SYSTOLIC BLOOD PRESSURE < 130 MM HG: ICD-10-PCS | Mod: HCNC,CPTII,S$GLB, | Performed by: NURSE PRACTITIONER

## 2019-08-16 PROCEDURE — 3079F DIAST BP 80-89 MM HG: CPT | Mod: HCNC,CPTII,S$GLB, | Performed by: NURSE PRACTITIONER

## 2019-08-16 PROCEDURE — G0439 PPPS, SUBSEQ VISIT: HCPCS | Mod: HCNC,S$GLB,, | Performed by: NURSE PRACTITIONER

## 2019-08-16 PROCEDURE — 3074F SYST BP LT 130 MM HG: CPT | Mod: HCNC,CPTII,S$GLB, | Performed by: NURSE PRACTITIONER

## 2019-08-16 PROCEDURE — 99999 PR PBB SHADOW E&M-EST. PATIENT-LVL V: CPT | Mod: PBBFAC,HCNC,, | Performed by: NURSE PRACTITIONER

## 2019-08-16 PROCEDURE — G0439 PR MEDICARE ANNUAL WELLNESS SUBSEQUENT VISIT: ICD-10-PCS | Mod: HCNC,S$GLB,, | Performed by: NURSE PRACTITIONER

## 2019-08-16 PROCEDURE — 99999 PR PBB SHADOW E&M-EST. PATIENT-LVL V: ICD-10-PCS | Mod: PBBFAC,HCNC,, | Performed by: NURSE PRACTITIONER

## 2019-08-16 PROCEDURE — 3079F PR MOST RECENT DIASTOLIC BLOOD PRESSURE 80-89 MM HG: ICD-10-PCS | Mod: HCNC,CPTII,S$GLB, | Performed by: NURSE PRACTITIONER

## 2019-08-16 NOTE — PROGRESS NOTES
"Vida Ornelas presented for a  Medicare AWV and comprehensive Health Risk Assessment today. The following components were reviewed and updated:    · Medical history  · Family History  · Social history  · Allergies and Current Medications  · Health Risk Assessment  · Health Maintenance  · Care Team     ** See Completed Assessments for Annual Wellness Visit within the encounter summary.**     The following assessments were completed:  · Living Situation  · CAGE  · Depression Screening  · Timed Get Up and Go  · Whisper Test  · Cognitive Function Screening      · Nutrition Screening  · ADL Screening  · PAQ Screening    Vitals:    08/16/19 1204   BP: 128/80   BP Location: Left arm   Patient Position: Sitting   BP Method: Large (Manual)   Pulse: 73   SpO2: 97%   Weight: 109.5 kg (241 lb 6.5 oz)   Height: 5' 4" (1.626 m)     Body mass index is 41.44 kg/m².  Physical Exam   Constitutional: She is oriented to person, place, and time. She appears well-nourished.   Cardiovascular: Normal rate, regular rhythm, normal heart sounds and intact distal pulses.   Pulmonary/Chest: Effort normal and breath sounds normal.   Neurological: She is alert and oriented to person, place, and time.   Skin: Skin is warm and dry.   Vitals reviewed.      Diagnoses and health risks identified today and associated recommendations/orders:    1. Encounter for preventive health examination  Reviewed and discussed health maintenance.      2. Osteoarthritis of lumbar spine, unspecified spinal osteoarthritis complication status  Continue pain management follow up (Dr. Steele)  - Ambulatory Referral to Physical/Occupational Therapy    3. DDD (degenerative disc disease), lumbar  Continue pain management follow up (Dr. Steele)  - Ambulatory Referral to Physical/Occupational Therapy    4. Chronic low back pain without sciatica, unspecified back pain laterality  Continue pain management follow up (Dr. Steele)  - Ambulatory Referral to " Physical/Occupational Therapy    5. Hyperlipidemia, unspecified hyperlipidemia type  Stable- continue current treatment and follow up routinely with PCP     6. Essential hypertension  Stable- continue current treatment and follow up routinely with PCP     7. Other specified hypothyroidism  Stable- continue current treatment and follow up routinely with PCP     8. Vitamin D deficiency  Stable- continue current treatment and follow up routinely with PCP     9. Deep vein thrombosis (DVT) of other vein of left lower extremity, unspecified chronicity  Stable- continue current treatment and follow up routinely with PCP     Provided Vida with a 5-10 year written screening schedule and personal prevention plan. Recommendations were developed using the USPSTF age appropriate recommendations. Education, counseling, and referrals were provided as needed. After Visit Summary printed and given to patient which includes a list of additional screenings\tests needed.    Rekha Thomas NP

## 2019-08-16 NOTE — PATIENT INSTRUCTIONS
Counseling and Referral of Other Preventative  (Italic type indicates deductible and co-insurance are waived)    Patient Name: Vida Ornelas  Today's Date: 8/16/2019    Health Maintenance       Date Due Completion Date    Shingles Vaccine (1 of 2) 06/10/2007 ---    Influenza Vaccine (1) 08/01/2019 10/30/2013    Mammogram 10/25/2020 10/25/2018    Lipid Panel 01/21/2024 1/21/2019    Colonoscopy 06/15/2025 6/15/2015    TETANUS VACCINE 10/25/2028 10/25/2018        No orders of the defined types were placed in this encounter.    The following information is provided to all patients.  This information is to help you find resources for any of the problems found today that may be affecting your health:                Living healthy guide: www.[facility].[place].gov      Understanding Diabetes: www.diabetes.org      Eating healthy: www.cdc.gov/healthyweight      CDC home safety checklist: www.cdc.gov/steadi/patient.html      Agency on Aging: www.goea.[place].[facility]      Alcoholics anonymous (AA): www.aa.org      Physical Activity: www.bri.nih.gov/ll0xfyf      Tobacco use: www.quitwithusla.org

## 2019-08-21 ENCOUNTER — HOSPITAL ENCOUNTER (OUTPATIENT)
Dept: RADIOLOGY | Facility: HOSPITAL | Age: 64
Discharge: HOME | End: 2019-08-21
Attending: PHYSICIAN ASSISTANT
Payer: MEDICARE

## 2019-08-21 ENCOUNTER — OFFICE VISIT (OUTPATIENT)
Dept: FAMILY MEDICINE | Facility: CLINIC | Age: 64
End: 2019-08-21
Payer: MEDICARE

## 2019-08-21 VITALS
DIASTOLIC BLOOD PRESSURE: 68 MMHG | OXYGEN SATURATION: 95 % | SYSTOLIC BLOOD PRESSURE: 112 MMHG | HEART RATE: 75 BPM | WEIGHT: 241.38 LBS | BODY MASS INDEX: 41.44 KG/M2

## 2019-08-21 DIAGNOSIS — M54.6 ACUTE RIGHT-SIDED THORACIC BACK PAIN: Primary | ICD-10-CM

## 2019-08-21 DIAGNOSIS — M54.6 ACUTE RIGHT-SIDED THORACIC BACK PAIN: ICD-10-CM

## 2019-08-21 PROCEDURE — 71046 X-RAY EXAM CHEST 2 VIEWS: CPT | Mod: 26,HCNC,, | Performed by: RADIOLOGY

## 2019-08-21 PROCEDURE — 71046 XR CHEST PA AND LATERAL: ICD-10-PCS | Mod: 26,HCNC,, | Performed by: RADIOLOGY

## 2019-08-21 PROCEDURE — 71046 X-RAY EXAM CHEST 2 VIEWS: CPT | Mod: TC,HCNC,FY,PO

## 2019-08-21 PROCEDURE — 3008F BODY MASS INDEX DOCD: CPT | Mod: HCNC,CPTII,S$GLB, | Performed by: PHYSICIAN ASSISTANT

## 2019-08-21 PROCEDURE — 99214 OFFICE O/P EST MOD 30 MIN: CPT | Mod: HCNC,S$GLB,, | Performed by: PHYSICIAN ASSISTANT

## 2019-08-21 PROCEDURE — 3074F SYST BP LT 130 MM HG: CPT | Mod: HCNC,CPTII,S$GLB, | Performed by: PHYSICIAN ASSISTANT

## 2019-08-21 PROCEDURE — 99214 PR OFFICE/OUTPT VISIT, EST, LEVL IV, 30-39 MIN: ICD-10-PCS | Mod: HCNC,S$GLB,, | Performed by: PHYSICIAN ASSISTANT

## 2019-08-21 PROCEDURE — 3078F DIAST BP <80 MM HG: CPT | Mod: HCNC,CPTII,S$GLB, | Performed by: PHYSICIAN ASSISTANT

## 2019-08-21 PROCEDURE — 3008F PR BODY MASS INDEX (BMI) DOCUMENTED: ICD-10-PCS | Mod: HCNC,CPTII,S$GLB, | Performed by: PHYSICIAN ASSISTANT

## 2019-08-21 PROCEDURE — 99999 PR PBB SHADOW E&M-EST. PATIENT-LVL III: CPT | Mod: PBBFAC,HCNC,, | Performed by: PHYSICIAN ASSISTANT

## 2019-08-21 PROCEDURE — 3078F PR MOST RECENT DIASTOLIC BLOOD PRESSURE < 80 MM HG: ICD-10-PCS | Mod: HCNC,CPTII,S$GLB, | Performed by: PHYSICIAN ASSISTANT

## 2019-08-21 PROCEDURE — 3074F PR MOST RECENT SYSTOLIC BLOOD PRESSURE < 130 MM HG: ICD-10-PCS | Mod: HCNC,CPTII,S$GLB, | Performed by: PHYSICIAN ASSISTANT

## 2019-08-21 PROCEDURE — 99999 PR PBB SHADOW E&M-EST. PATIENT-LVL III: ICD-10-PCS | Mod: PBBFAC,HCNC,, | Performed by: PHYSICIAN ASSISTANT

## 2019-08-21 RX ORDER — TIZANIDINE 4 MG/1
4 TABLET ORAL NIGHTLY PRN
Qty: 10 TABLET | Refills: 0 | Status: SHIPPED | OUTPATIENT
Start: 2019-08-21 | End: 2019-08-31

## 2019-08-21 NOTE — PROGRESS NOTES
Subjective:       Patient ID: Vida Ornelas is a 64 y.o. female    Chief Complaint: Flank Pain (swollen as well on the right side. )    HPI  The patient presents today complaining of pain in her ribs on the right upper back that began about 2 weeks ago.  She notices the pain with certain positions.  She has been working on her elliptical machine and suspects that this may have triggered her pain, she denies any recent injury.  She has a history of pulmonary embolism and is taking Xarelto currently.  She denies any recent dyspnea.    Review of Systems   Constitutional: Negative for fatigue.   HENT: Negative for sinus pain.    Eyes: Negative for visual disturbance.   Respiratory: Negative for cough and shortness of breath.    Cardiovascular: Negative for chest pain.   Gastrointestinal: Negative for diarrhea, nausea and vomiting.   Musculoskeletal: Positive for back pain.   Skin: Negative for rash.   Neurological: Negative for dizziness and headaches.   Psychiatric/Behavioral: Negative for decreased concentration. The patient is not nervous/anxious.         Objective:   Physical Exam   Constitutional: She is oriented to person, place, and time. She appears well-developed and well-nourished. No distress.   HENT:   Head: Normocephalic and atraumatic.   Eyes: Pupils are equal, round, and reactive to light. EOM are normal.   Neck: Normal range of motion. Neck supple.   Cardiovascular: Normal rate, regular rhythm, normal heart sounds and intact distal pulses. Exam reveals no gallop and no friction rub.   No murmur heard.  Pulmonary/Chest: Effort normal and breath sounds normal. No respiratory distress. She has no wheezes. She has no rales. She exhibits no tenderness.   Abdominal: Soft. Bowel sounds are normal. She exhibits no distension and no mass. There is no tenderness. There is no guarding.   Musculoskeletal: Normal range of motion. She exhibits tenderness (Patient reports soreness in the affected area upon  palpation).   Neurological: She is alert and oriented to person, place, and time.   Skin: Skin is warm and dry. No rash noted. She is not diaphoretic.   Psychiatric: She has a normal mood and affect. Her behavior is normal. Judgment and thought content normal.        Assessment:       1. Acute right-sided thoracic back pain  X-Ray Chest PA And Lateral    tiZANidine (ZANAFLEX) 4 MG tablet        Plan:       Acute right-sided thoracic back pain  -     X-Ray Chest PA And Lateral; Future; Expected date: 08/21/2019  -     tiZANidine (ZANAFLEX) 4 MG tablet; Take 1 tablet (4 mg total) by mouth nightly as needed (muscle strain).  Dispense: 10 tablet; Refill: 0

## 2019-09-06 DIAGNOSIS — J32.9 SINUSITIS, UNSPECIFIED CHRONICITY, UNSPECIFIED LOCATION: ICD-10-CM

## 2019-09-06 RX ORDER — LEVOCETIRIZINE DIHYDROCHLORIDE 5 MG/1
TABLET, FILM COATED ORAL
Qty: 30 TABLET | Refills: 11 | Status: SHIPPED | OUTPATIENT
Start: 2019-09-06

## 2019-09-09 ENCOUNTER — HOSPITAL ENCOUNTER (OUTPATIENT)
Facility: HOSPITAL | Age: 64
Discharge: HOME | End: 2019-09-09
Attending: INTERNAL MEDICINE | Admitting: INTERNAL MEDICINE
Payer: MEDICARE

## 2019-09-09 ENCOUNTER — ANESTHESIA (OUTPATIENT)
Dept: ENDOSCOPY | Facility: HOSPITAL | Age: 64
End: 2019-09-09
Payer: MEDICARE

## 2019-09-09 ENCOUNTER — ANESTHESIA EVENT (OUTPATIENT)
Dept: ENDOSCOPY | Facility: HOSPITAL | Age: 64
End: 2019-09-09
Payer: MEDICARE

## 2019-09-09 VITALS
OXYGEN SATURATION: 98 % | HEART RATE: 67 BPM | BODY MASS INDEX: 40.63 KG/M2 | WEIGHT: 238 LBS | SYSTOLIC BLOOD PRESSURE: 141 MMHG | HEIGHT: 64 IN | RESPIRATION RATE: 15 BRPM | DIASTOLIC BLOOD PRESSURE: 67 MMHG | TEMPERATURE: 98 F

## 2019-09-09 DIAGNOSIS — D64.9 ANEMIA: ICD-10-CM

## 2019-09-09 PROCEDURE — 63600175 PHARM REV CODE 636 W HCPCS: Mod: HCNC,PO | Performed by: INTERNAL MEDICINE

## 2019-09-09 PROCEDURE — 45378 DIAGNOSTIC COLONOSCOPY: CPT | Mod: HCNC,,, | Performed by: INTERNAL MEDICINE

## 2019-09-09 PROCEDURE — 37000008 HC ANESTHESIA 1ST 15 MINUTES: Mod: HCNC,PO | Performed by: INTERNAL MEDICINE

## 2019-09-09 PROCEDURE — 25000003 PHARM REV CODE 250: Mod: HCNC,PO | Performed by: INTERNAL MEDICINE

## 2019-09-09 PROCEDURE — G0105 COLORECTAL SCRN; HI RISK IND: HCPCS | Mod: HCNC,PO | Performed by: INTERNAL MEDICINE

## 2019-09-09 PROCEDURE — 37000009 HC ANESTHESIA EA ADD 15 MINS: Mod: HCNC,PO | Performed by: INTERNAL MEDICINE

## 2019-09-09 PROCEDURE — 45378 PR COLONOSCOPY,DIAGNOSTIC: ICD-10-PCS | Mod: HCNC,,, | Performed by: INTERNAL MEDICINE

## 2019-09-09 PROCEDURE — 45378 DIAGNOSTIC COLONOSCOPY: CPT | Mod: PO | Performed by: INTERNAL MEDICINE

## 2019-09-09 PROCEDURE — D9220A PRA ANESTHESIA: ICD-10-PCS | Mod: HCNC,ANES,, | Performed by: ANESTHESIOLOGY

## 2019-09-09 PROCEDURE — D9220A PRA ANESTHESIA: Mod: HCNC,ANES,, | Performed by: ANESTHESIOLOGY

## 2019-09-09 PROCEDURE — 63600175 PHARM REV CODE 636 W HCPCS: Mod: HCNC,PO | Performed by: NURSE ANESTHETIST, CERTIFIED REGISTERED

## 2019-09-09 RX ORDER — LIDOCAINE HYDROCHLORIDE 10 MG/ML
1 INJECTION, SOLUTION EPIDURAL; INFILTRATION; INTRACAUDAL; PERINEURAL ONCE
Status: COMPLETED | OUTPATIENT
Start: 2019-09-09 | End: 2019-09-09

## 2019-09-09 RX ORDER — SODIUM CHLORIDE 0.9 % (FLUSH) 0.9 %
10 SYRINGE (ML) INJECTION
Status: DISCONTINUED | OUTPATIENT
Start: 2019-09-09 | End: 2019-09-09 | Stop reason: HOSPADM

## 2019-09-09 RX ORDER — CIPROFLOXACIN 2 MG/ML
400 INJECTION, SOLUTION INTRAVENOUS ONCE
Status: COMPLETED | OUTPATIENT
Start: 2019-09-09 | End: 2019-09-09

## 2019-09-09 RX ORDER — PROPOFOL 10 MG/ML
VIAL (ML) INTRAVENOUS
Status: DISCONTINUED | OUTPATIENT
Start: 2019-09-09 | End: 2019-09-09

## 2019-09-09 RX ORDER — SODIUM CHLORIDE, SODIUM LACTATE, POTASSIUM CHLORIDE, CALCIUM CHLORIDE 600; 310; 30; 20 MG/100ML; MG/100ML; MG/100ML; MG/100ML
INJECTION, SOLUTION INTRAVENOUS CONTINUOUS
Status: DISCONTINUED | OUTPATIENT
Start: 2019-09-09 | End: 2019-09-09 | Stop reason: HOSPADM

## 2019-09-09 RX ORDER — LIDOCAINE HCL/PF 100 MG/5ML
SYRINGE (ML) INTRAVENOUS
Status: DISCONTINUED | OUTPATIENT
Start: 2019-09-09 | End: 2019-09-09

## 2019-09-09 RX ADMIN — PROPOFOL 30 MG: 10 INJECTION, EMULSION INTRAVENOUS at 11:09

## 2019-09-09 RX ADMIN — SODIUM CHLORIDE, SODIUM LACTATE, POTASSIUM CHLORIDE, AND CALCIUM CHLORIDE: .6; .31; .03; .02 INJECTION, SOLUTION INTRAVENOUS at 11:09

## 2019-09-09 RX ADMIN — LIDOCAINE HYDROCHLORIDE 1 MG: 10 INJECTION, SOLUTION EPIDURAL; INFILTRATION; INTRACAUDAL; PERINEURAL at 11:09

## 2019-09-09 RX ADMIN — LIDOCAINE HYDROCHLORIDE 100 MG: 20 INJECTION, SOLUTION INTRAVENOUS at 11:09

## 2019-09-09 RX ADMIN — CIPROFLOXACIN 400 MG: 2 INJECTION, SOLUTION INTRAVENOUS at 11:09

## 2019-09-09 NOTE — ANESTHESIA PREPROCEDURE EVALUATION
09/09/2019  Vida Ornelas is a 64 y.o., female.    Pre-op Assessment      I have reviewed the Medications.     Review of Systems  Anesthesia Hx:  No problems with previous Anesthesia    Social:  Non-Smoker, No Alcohol Use    Cardiovascular:   Hypertension hyperlipidemia    Pulmonary:  Pulmonary Normal    Renal/:  Renal/ Normal     Hepatic/GI:   GERD    Musculoskeletal:   Arthritis     Neurological:  Neurology Normal    Endocrine:   Hypothyroidism        Physical Exam  General:  Obesity    Airway/Jaw/Neck:  Airway Findings: Mouth Opening: Normal Tongue: Normal  General Airway Assessment: Adult, Average  Mallampati: II  Jaw/Neck Findings:  Neck ROM: Normal ROM       Chest/Lungs:  Chest/Lungs Findings: Clear to auscultation, Normal Respiratory Rate     Heart/Vascular:  Heart Findings: Rate: Normal  Rhythm: Regular Rhythm  Sounds: Normal  Heart murmur: negative       Mental Status:  Mental Status Findings:  Cooperative, Alert and Oriented         Anesthesia Plan  Type of Anesthesia, risks & benefits discussed:  Anesthesia Type:  general  Patient's Preference:   Intra-op Monitoring Plan:   Intra-op Monitoring Plan Comments:   Post Op Pain Control Plan:   Post Op Pain Control Plan Comments:   Induction:   IV  Beta Blocker:  Patient is not currently on a Beta-Blocker (No further documentation required).       Informed Consent: Patient understands risks and agrees with Anesthesia plan.  Questions answered. Anesthesia consent signed with patient.  ASA Score: 2     Day of Surgery Review of History & Physical:        Anesthesia Plan Notes: Propofol general        Ready For Surgery From Anesthesia Perspective.

## 2019-09-09 NOTE — ANESTHESIA POSTPROCEDURE EVALUATION
Anesthesia Post Evaluation    Patient: Vida Ornelas    Procedure(s) Performed: Procedure(s) (LRB):  COLONOSCOPY (N/A)    Final Anesthesia Type: general  Patient location during evaluation: PACU  Patient participation: Yes- Able to Participate  Level of consciousness: awake and alert, oriented and awake  Post-procedure vital signs: reviewed and stable  Pain management: adequate  Airway patency: patent  PONV status at discharge: No PONV  Anesthetic complications: no      Cardiovascular status: blood pressure returned to baseline and hemodynamically stable  Respiratory status: unassisted, spontaneous ventilation and room air  Hydration status: euvolemic  Follow-up not needed.          Vitals Value Taken Time   /67 9/9/2019 12:04 PM   Temp 36.6 °C (97.8 °F) 9/9/2019 11:54 AM   Pulse 67 9/9/2019 12:04 PM   Resp 15 9/9/2019 12:04 PM   SpO2 98 % 9/9/2019 12:04 PM         Event Time     Out of Recovery 12:22:31          Pain/Philippe Score: Philippe Score: 10 (9/9/2019 12:04 PM)

## 2019-09-09 NOTE — BRIEF OP NOTE
Discharge Note  Short Stay      SUMMARY     Admit Date: 9/9/2019    Attending Physician: Juan David Davis Jr., MD     Discharge Physician: Juan David Davis Jr., MD    Discharge Date: 9/9/2019 12:08 PM    Final Diagnosis: Anemia, unspecified type [D64.9]  Fecal occult blood test positive [R19.5]  Impression:          - The anus is normal.                       - Non-bleeding internal hemorrhoids.                       - The entire examined colon is normal.                       - The examined portion of the ileum was normal.                       - No specimens collected.  Recommendation:      - Discharge patient to home.                       - High fiber diet.                       - Use fiber, for example Citrucel, Fibercon, Konsyl                        or Metamucil.                       - Take a PROBIOTIC, such as a carton of GREEK YOGURT                        (Chobani or Oikos, or Activia or Dannon); or tablets                        of ALIGN or CULTURELLE or NEW-Q (all                        non-prescription), every day for a month.                       - Return to primary care physician as previously                        scheduled.                       - Repeat colonoscopy in 7 years for screening                        purposes.                       - Continue present medications.                       - Patient has a contact number available for                        emergencies. The signs and symptoms of potential                        delayed complications were discussed with the                        patient. Return to normal activities tomorrow.                        Written discharge instructions were provided to the                        patient.                       - Return to normal activities tomorrow.  Juan David Davis MD  9/9/2019   Disposition: HOME OR SELF CARE    Patient Instructions:   Current Discharge Medication List      CONTINUE these medications which have NOT  CHANGED    Details   dexlansoprazole (DEXILANT) 60 mg capsule Take 1 capsule (60 mg total) by mouth once daily.  Qty: 30 capsule, Refills: 11    Associated Diagnoses: Gastroesophageal reflux disease without esophagitis; History of gastritis      !! diclofenac sodium (VOLTAREN) 1 % Gel Apply 2 g topically 4 (four) times daily.  Qty: 100 g, Refills: 3    Associated Diagnoses: Rheumatoid arthritis of hand, unspecified laterality, unspecified rheumatoid factor presence; ESR raised; Osteoarthrosis of hip; Primary osteoarthritis of both hips      ferrous sulfate (FEOSOL) 325 mg (65 mg iron) Tab tablet Take 1 tablet (325 mg total) by mouth daily with breakfast.  Qty: 30 tablet, Refills: 2      hydrocodone-acetaminophen 10-325mg (NORCO)  mg Tab Take 1 tablet by mouth.      levocetirizine (XYZAL) 5 MG tablet TAKE ONE TABLET BY MOUTH ONCE DAILY IN THE EVENING  Qty: 30 tablet, Refills: 11    Comments: This prescription was filled on 9/6/2019. Any refills authorized will be placed on file.  Associated Diagnoses: Sinusitis, unspecified chronicity, unspecified location      levothyroxine (SYNTHROID) 25 MCG tablet Take 25 mcg by mouth.      losartan (COZAAR) 25 MG tablet Take 1 tablet (25 mg total) by mouth once daily.  Qty: 30 tablet, Refills: 3    Associated Diagnoses: Hypertension, unspecified type      multivitamin with minerals tablet Take 1 tablet by mouth.      sucralfate (CARAFATE) 1 gram tablet TAKE ONE TABLET BY MOUTH FOUR TIMES DAILY before meals and nightly  Refills: 11      !! VOLTAREN 1 % Gel Apply topically 4 (four) times daily.  Qty: 400 g, Refills: 4    Associated Diagnoses: Rheumatoid arthritis of hand, unspecified laterality, unspecified rheumatoid factor presence; Hip pain, left; Osteoarthrosis of hip; Acquired hypothyroidism; ESR raised; Primary osteoarthritis of both hips      XARELTO 20 mg Tab Take 1 tablet by mouth once daily.  Refills: 0      clotrimazole-betamethasone 1-0.05% (LOTRISONE) cream Apply  topically 2 (two) times daily.  Qty: 45 g, Refills: 10    Associated Diagnoses: Rheumatoid arthritis of hand, unspecified laterality, unspecified rheumatoid factor presence; ESR raised; Osteoarthrosis of hip; Primary osteoarthritis of both hips; Candidemia      leflunomide (ARAVA) 20 MG Tab Take 1 tablet (20 mg total) by mouth once daily.  Qty: 30 tablet, Refills: 6    Associated Diagnoses: Rheumatoid arthritis of hand, unspecified laterality, unspecified rheumatoid factor presence; ESR raised; Osteoarthrosis of hip; Primary osteoarthritis of both hips; Candidemia       !! - Potential duplicate medications found. Please discuss with provider.      STOP taking these medications       lidocaine HCL 2% (XYLOCAINE) 2 % jelly Comments:   Reason for Stopping:         LIDOCAINE VISCOUS 2 % solution Comments:   Reason for Stopping:               Discharge Procedure Orders (must include Diet, Follow-up, Activity)    Follow Up:  Follow up with PCP as per your routine.  Please follow a high fiber diet.  Activity as tolerated.    No driving day of procedure.    PROBIOTICS:  Now that your colon is so cleaned out, now is a good time for a round of PROBIOTICS.  Eat a container of Greek Yogurt, such as OIKOS or CHOBANI,  Or Activia or Dannon    Greek Yogurt.    Or Take a similar Probiotic product such as Align or Culturelle or Elif-Q, every day for a month.                  (The products listed are non-prescription, but you may need to ask the pharmacist for their location.)  Repeat this 4-6 times a year.

## 2019-09-09 NOTE — H&P
History & Physical - Short Stay  Gastroenterology      SUBJECTIVE:     Procedure: Colonoscopy    Chief Complaint/Indication for Procedure: Iron deficiency anemia.    History of Present Illness:  Office Visit     5/16/2019  [facility] - Gastroenterology      [facility]   Gastroenterology   Anemia, unspecified type +4 more   Dx   Follow-up ; Referred by Dali Mora MD   Reason for Visit        Progress Notes        Subjective:       Patient ID: Vida Ornelas is a 63 y.o. female Body mass index is 39.09 kg/m².     Chief Complaint: Follow-up     Established patient of Dr. Davis & myself.  Referred by Dr. Mora for iron deficiency anemia and GERD     Gastroesophageal Reflux   She complains of heartburn. She reports no abdominal pain, no chest pain, no choking, no coughing, no dysphagia, no globus sensation, no hoarse voice, no nausea, no sore throat or no water brash. This is a chronic problem. Episode onset: several years ago. The problem occurs rarely. The problem has been resolved (since she stopped celebrex and on acid reducing medication). The heartburn does not wake her from sleep. The symptoms are aggravated by stress and certain foods (red sauce, spicy foods, peanut, sweets). Associated symptoms include anemia and weight loss (lost 4 lbs over the past few months with dieting and exercise). Pertinent negatives include no fatigue or melena (resolved). Risk factors include caffeine use, obesity, NSAIDs and smoking/tobacco exposure (mobic daily; celebrex was discontinued; former smoker). She has tried a PPI and a histamine-2 antagonist (dexilant 60 mg once daily; carafate 1 gram qid; PAST: omeprazole, zantac- stopped ~3/2019) for the symptoms. The treatment provided significant relief. Past procedures include an abdominal ultrasound, an EGD and a UGI.      Review of Systems   Constitutional: Positive for appetite change (back to normal) and weight loss (lost 4 lbs over the past few months with  "dieting and exercise). Negative for chills, diaphoresis and fatigue.   HENT: Negative for hoarse voice, sore throat and trouble swallowing (resolved).    Respiratory: Negative for cough, choking and shortness of breath.    Cardiovascular: Negative for chest pain.   Gastrointestinal: Positive for heartburn. Negative for abdominal distention, abdominal pain, anal bleeding, blood in stool, constipation, diarrhea, dysphagia, melena (resolved), nausea, rectal pain and vomiting.   Genitourinary: Negative for difficulty urinating and flank pain.   Musculoskeletal: Negative for back pain.        Sees rheumatology for RA; Patient reports she is in a wheelchair because she is having problems with her hip and needs to have hip surgery; taking norco once daily     6/15/15 Colonoscopy was reviewed and procedure report states:   " Impression: - Mild colonic spasm consistent with irritable bowel   syndrome.  - The examination was otherwise normal.  - The examined portion of the ileum was normal.  - No specimens collected.  Recommendation: - Discharge patient to home.  - High fiber diet.  - Take a PROBIOTIC, such as a carton of GREEK YOGURT   (Chobani or Oikos, or Activia or Dannon); or tablets   of ALIGN or CULTURELLE or NEW-Q (all   non-prescription), every day for a month.  - Repeat colonoscopy in 6 years for screening   purposes.  - Use Bentyl (dicyclomine) 10 mg PO QID 30 min AC.  - Continue present medications.  - Patient has a contact number available for   emergencies. The signs and symptoms of potential   delayed complications were discussed with the   patient. Return to normal activities tomorrow.   Written discharge instructions were provided to the   patient.  - Return to normal activities tomorrow. ".    Assessment:       1. Anemia, unspecified type    2. NSAID long-term use    3. Occult blood positive stool    4. Gastroesophageal reflux disease without esophagitis    5. History of gastritis        Plan:     "   Anemia, unspecified type & Occult blood positive stool  - schedule Colonoscopy, discussed procedure with the patient, patient verbalized understanding  - discussed with patient the different ways that anemia occurs: blood loss (such as from the gi tract), the body is not making enough, or the body is breaking down the rbcs too quickly; recommend EGD and colonoscopy to further evaluate gi tract for possible blood loss and pending results of endoscopies, possible UGI with Small Bowel Follow Through/video capsule study  -follow-up with PCP and/or hematology for continued evaluation and management     Gastroesophageal reflux disease without esophagitis & History of gastritis  -  DISCONTINUE   sucralfate (CARAFATE) THERAPY COMPLETED  -  REFILL   dexlansoprazole (DEXILANT) 60 mg capsule; Take 1 capsule (60 mg total) by mouth once daily.  Dispense: 30 capsule; Refill: 11, take in the morning before breakfast, discussed about possible long term use of medication, pt verbalized understanding and wants to continue this medication at current dosage   -discussed the different types of medications used to treat reflux and how to use them, antacids can be used PRN for breakthrough heartburn symptoms by reducing stomach acid that is already produced, H2 blockers (zantac) work by limiting the amount acid production, & PPI's work to block acid production and are taken daily, patient verbalized understanding.  - continue zantac 300 mg once daily as directed  - continue lifestyle modifications to help control reflux including: avoid large meals, avoid eating within 2-3 hours of bedtime (avoid late night eating & lying down soon after eating), elevate head of bed if nocturnal symptoms are present, & weight loss.   - avoid known foods which trigger reflux symptoms & to minimize/avoid high-fat foods, chocolate, caffeine, citrus, alcohol, & tomato products.  - avoid/limit use of NSAID's, since they can cause GI upset, bleeding, and/or  ulcers. If needed, take with food.  - recommend annual monitoring with blood work to include CMP, CBC, vitamin B12, and magnesium; complete B12 and magnesium levels as previously ordered     NSAID long-term use  - avoid/minimize use of NSAIDs- since they can cause GI upset, bleeding and/or ulcers. If NSAID must be taken, recommend take with food.     Anticoagulant long-term use  - informed patient that the anticoagulant(s) will likely need to be held for endoscopy, nurse will confirm with cardiologist/PCP.     Follow up in about 1 month (around 6/16/2019), or if symptoms worsen or fail to improve.            LABS:  Results for ASHLEY NAVARRETE (MRN [id number]) as of 9/8/2019 21:24   Ref. Range 9/21/2018 13:40 1/21/2019 10:49 1/29/2019 08:44 4/29/2019 11:59 5/7/2019 14:46 7/15/2019 15:41   Hemoglobin Latest Ref Range: 12.0 - 16.0 g/dL 12.2 10.9 (L) 11.2 (L) 10.4 (L) 10.7 (L) 10.9 (L)   Hematocrit Latest Ref Range: 37.0 - 48.5 % 39.6 36.5 (L) 36.7 (L) 34.9 (L) 34.5 (L) 36.7 (L)   MCV Latest Ref Range: 82 - 98 fL 90 93 92 86 84 90       Results for ASHLEY NAVARRETE (MRN [id number]) as of 9/8/2019 21:24   Ref. Range 5/16/2019 15:03   Iron Latest Ref Range: 30 - 160 ug/dL 43   TIBC Latest Ref Range: 250 - 450 ug/dL 369   Saturated Iron Latest Ref Range: 20 - 50 % 12 (L)   Transferrin Latest Ref Range: 200 - 375 mg/dL 249   Ferritin Latest Ref Range: 20.0 - 300.0 ng/mL 66   Vitamin B-12 Latest Ref Range: 210 - 950 pg/mL 656       PTA Medications   Medication Sig    dexlansoprazole (DEXILANT) 60 mg capsule Take 1 capsule (60 mg total) by mouth once daily.    diclofenac sodium (VOLTAREN) 1 % Gel Apply 2 g topically 4 (four) times daily.    ferrous sulfate (FEOSOL) 325 mg (65 mg iron) Tab tablet Take 1 tablet (325 mg total) by mouth daily with breakfast.    hydrocodone-acetaminophen 10-325mg (NORCO)  mg Tab Take 1 tablet by mouth.    levocetirizine (XYZAL) 5 MG tablet TAKE ONE TABLET BY MOUTH ONCE DAILY IN THE  EVENING    levothyroxine (SYNTHROID) 25 MCG tablet Take 25 mcg by mouth.    losartan (COZAAR) 25 MG tablet Take 1 tablet (25 mg total) by mouth once daily.    multivitamin with minerals tablet Take 1 tablet by mouth.    sucralfate (CARAFATE) 1 gram tablet TAKE ONE TABLET BY MOUTH FOUR TIMES DAILY before meals and nightly    VOLTAREN 1 % Gel Apply topically 4 (four) times daily.    XARELTO 20 mg Tab Take 1 tablet by mouth once daily.    [DISCONTINUED] lidocaine HCL 2% (XYLOCAINE) 2 % jelly Apply ONE a small amount to affected area three times a day as needed    [DISCONTINUED] LIDOCAINE VISCOUS 2 % solution Apply a small amount to affected area three times a day as needed    clotrimazole-betamethasone 1-0.05% (LOTRISONE) cream Apply topically 2 (two) times daily.    leflunomide (ARAVA) 20 MG Tab Take 1 tablet (20 mg total) by mouth once daily.       Review of patient's allergies indicates:   Allergen Reactions    Nsaids (non-steroidal anti-inflammatory drug) Other (See Comments)     Upset stomach-burning sensation    Ibuprofen Diarrhea     Stomach pains  States can take    Nifedipine      Pt does not remember reaction    Plaquenil [hydroxychloroquine] Diarrhea    Tacrolimus      Pt does not remember reaction    Penicillins Itching        Past Medical History:   Diagnosis Date    Arthritis     Seronegative rheumatoid arthritis    Back pain     s/p ena     Cataract     Colon polyp     ESR raised     Former smoker     GERD with stricture     Hyperlipidemia     Hypertension     Hypothyroidism     Irritable bowel syndrome     Peptic ulcer disease     RA (rheumatoid arthritis)     Vitamin D deficiency      Past Surgical History:   Procedure Laterality Date    COLONOSCOPY  6/15/2015    Dr. lepe: colonic spasm consistent with IBS, otherwise normal findings; repeat in 5 years for surveillance due to family history of colon cancer in father    COLONOSCOPY N/A 6/15/2015    Performed by Juan David GALVAN  "Ryan Guy MD at [facility]    COLONOSCOPY W/ POLYPECTOMY  ? ?  (Stevie)    Benign polyps removed.    EGD (ESOPHAGOGASTRODUODENOSCOPY) N/A 2019    Performed by Juan [facility] Davis Jr., MD at [facility]    ESOPHAGOGASTRODUODENOSCOPY  early ??    ESOPHAGOGASTRODUODENOSCOPY (EGD) N/A 10/3/2017    Performed by Juan [facility] Jr[facility] at [facility]    ESOPHAGOGASTRODUODENOSCOPY (EGD) N/A 4/15/2016    Performed by [facility] Jr[facility]    ESOPHAGOGASTRODUODENOSCOPY (EGD) N/A 2015    Performed by Juan [facility] Jr[facility]    ESOPHAGOSCOPY W/ DILATION  2015  yRan    Non-erosive esophageal reflux (NERD) disease?.  Dysphagia, esophageal spasm?   Esophagus dilated, 51 Fr.  Antral erythema.  DAYNA Test  Negative.    HYSTERECTOMY      JOINT REPLACEMENT      right and left hip  r-5yrs  l-1yr ago    SMALL BOWEL ENDOSCOPY-UPPER N/A 4/15/2016    Performed by Jaun David Davis Jr., MD [facility]    TOTAL HIP ARTHROPLASTY Right 2015    UPPER GASTROINTESTINAL ENDOSCOPY  2015    UPPER GASTROINTESTINAL ENDOSCOPY      dilation    UPPER GASTROINTESTINAL ENDOSCOPY  04/15/2016    Dr. Davis    UPPER GASTROINTESTINAL ENDOSCOPY  10/03/2017    Dr. Davis    UPPER GASTROINTESTINAL ENDOSCOPY  2019    Dr. Davis: NERD?, antritis; biopsy: "Antral mucosa with reactive/regenerative changes and focal active inflammation", negative for h pylori; duodenum unremarkable     Family History   Problem Relation Age of Onset    Rheum arthritis Mother     Arthritis Mother         rheumatoid arthiritis    Lupus Sister     Hypertension Sister     Arthritis Sister         rheumatoid arthritis    Macular degeneration Sister     Arthritis Father         rheumatoid arthritis    Colon cancer Father         unsure of age of diagnosis,  at 85 years old    Cataracts Father     Breast cancer Neg Hx     Crohn's disease Neg Hx     Ulcerative colitis Neg Hx     Stomach cancer Neg Hx  " "   Esophageal cancer Neg Hx     Amblyopia Neg Hx     Blindness Neg Hx     Diabetes Neg Hx     Glaucoma Neg Hx     Retinal detachment Neg Hx     Strabismus Neg Hx     Stroke Neg Hx     Thyroid disease Neg Hx      Social History     Tobacco Use    Smoking status: Former Smoker     Last attempt to quit: 1975     Years since quittin.2    Smokeless tobacco: Never Used   Substance Use Topics    Alcohol use: No     Alcohol/week: 0.0 oz    Drug use: No         OBJECTIVE:     Vital Signs (Most Recent)  Temp: 97.7 °F (36.5 °C) (19 1105)  Pulse: 69 (19 1105)  Resp: 16 (19 1105)  BP: 138/67 (19 1105)  SpO2: 98 % (19 1105)    Physical Exam:  :Ht  5' 4" (1.626 m)  Wt 109.5 kg (241 lb 6.5 oz)   BMI 41.44 kg/m² GENERAL:  Comfortable, in no acute distress.                                 HEENT EXAM:  Nonicteric.  No adenopathy.  Oropharynx is clear. NECK:  Supple.                                                               LUNGS:  Clear.                                                               CARDIAC:  Regular rate and rhythm.  S1, S2.  No murmur.         ABDOMEN:  Soft, positive bowel sounds, nontender.  No hepatosplenomegaly or masses.  No rebound or guarding.             EXTREMITIES:  No edema.     MENTAL STATUS:  Alert and oriented.    ASSESSMENT/PLAN:     Assessment:  Iron deficiency anemia.  Recent hip surgery, will cover with abx.    Plan: Colonoscopy    Anesthesia Plan:   MAC / General Anaesthesia    ASA Grade: ASA 2 - Patient with mild systemic disease with no functional limitations    MALLAMPATI SCORE: II (hard and soft palate, upper portion of tonsils anduvula visible)    "

## 2019-09-09 NOTE — TRANSFER OF CARE
"Anesthesia Transfer of Care Note    Patient: Vida Ornelas    Procedure(s) Performed: Procedure(s) (LRB):  COLONOSCOPY (N/A)    Patient location: PACU    Anesthesia Type: general    Transport from OR: Transported from OR on room air with adequate spontaneous ventilation    Post pain: adequate analgesia    Post assessment: no apparent anesthetic complications and tolerated procedure well    Post vital signs: stable    Level of consciousness: awake and alert    Nausea/Vomiting: no nausea/vomiting    Complications: none    Transfer of care protocol was followed      Last vitals:   Visit Vitals  /67 (BP Location: Right arm, Patient Position: Lying)   Pulse 69   Temp 36.5 °C (97.7 °F) (Skin)   Resp 16   Ht 5' 4" (1.626 m)   Wt 108 kg (238 lb)   SpO2 98%   BMI 40.85 kg/m²     "

## 2019-09-09 NOTE — DISCHARGE INSTRUCTIONS
Recovery After Procedural Sedation (Adult)  You have been given medicine by vein to make you sleep during your surgery. This may have included both a pain medicine and sleeping medicine. Most of the effects have worn off. But you may still have some drowsiness for the next 6 to 8 hours.  Home care  Follow these guidelines when you get home:  · For the next 8 hours, you should be watched by a responsible adult. This person should make sure your condition is not getting worse.  · Don't drink any alcohol for the next 24 hours.  · Don't drive, operate dangerous machinery, or make important business or personal decisions during the next 24 hours.  Note: Your healthcare provider may tell you not to take any medicine by mouth for pain or sleep in the next 4 hours. These medicines may react with the medicines you were given in the hospital. This could cause a much stronger response than usual.  Follow-up care  Follow up with your healthcare provider if you are not alert and back to your usual level of activity within 12 hours.  When to seek medical advice  Call your healthcare provider right away if any of these occur:  · Drowsiness gets worse  · Weakness or dizziness gets worse  · Repeated vomiting  · You can't be awakened   Date Last Reviewed: 10/18/2016  © 1715-1632 [employer]. [address] [place] [address], [place] [address]. All rights reserved. This information is not intended as a substitute for professional medical care. Always follow your healthcare professional's instructions.      PROBIOTICS:  Now that your colon is so cleaned out, now is a good time for a round of PROBIOTICS.  Eat a container of Greek Yogurt, such as OIKOS or CHOBANI,  Or Activia or Dannon    Greek Yogurt.    Or Take a similar Probiotic product such as Align or Culturelle or Elif-Q, every day for a month.                  (The products listed are non-prescription, but you may need to ask the pharmacist for their location.)  Repeat  this 4-6 times a year.      High-Fiber Diet  Fiber is in fruits, vegetables, cereals, and grains. Fiber passes through your body undigested. A high-fiber diet helps food move through your intestinal tract. The added bulk is helpful in preventing constipation. In people with diverticulosis, fiber helps clean out the pouches along the colon wall. It also prevents new pouches from forming. A high-fiber diet reduces the risk of colon cancer. It also lowers blood cholesterol and prevents high blood sugar in people with diabetes.    The fiber-rich foods listed below should be part of your diet. If you are not used to high-fiber foods, start with 1 or 2 foods from this list. Every 3 to 4 days add a new one to your diet. Do this until you are eating 4 high-fiber foods per day. This should give you 20 to 35 grams of fiber a day. It is also important to drink a lot of water when you are on this diet. You should have 6 to 8 glasses of water a day. Water makes the fiber swell and increases the benefit.  Foods high in dietary fiber  The following foods are high in dietary fiber:  · Breads. Breads made with 100% whole-wheat flour; edna, wheat, or rye crackers; whole-grain tortillas, bran muffins.  · Cereals. Whole-grain and bran cereals with bran (shredded wheat, wheat flakes, raisin bran, corn bran); oatmeal, rolled oats, granola, and brown rice.  · Fruits. Fresh fruits and their edible skins (pears, prunes, raisins, berries, apples, and apricots); bananas, citrus fruit, mangoes, pineapple; and prune juice.  · Nuts. Any nuts and seeds.  · Vegetables. Best served raw or lightly cooked. All types, especially: green peas, celery, eggplant, potatoes, spinach, broccoli, [place] sprouts, winter squash, carrots, cauliflower, soybeans, lentils, and fresh and dried beans of all kinds.  · Other. Popcorn, any spices.  Date Last Reviewed: 8/1/2016  © 8905-6591 [employer]. [address] [place] [address], [place] [address]. All  rights reserved. This information is not intended as a substitute for professional medical care. Always follow your healthcare professional's instructions.

## 2019-09-09 NOTE — [employer] PATIENT INSTRUCTIONS
Discharge Summary/Instructions for after Colonoscopy without   Biopsy/Polypectomy  [facility] Ornelas    Monday, September 09, 2019  Juan David Davis MD  RESTRICTIONS ON ACTIVITY:  - Do not drive a car or operate machinery until the day after the procedure.      - The following day: return to full activity including work.  - For  3 days: No heavy lifting, straining or running.  - Diet: You may eat solid foods, but no gassy foods (i.e. beans, broccoli,   cabbage, etc).  TREATMENT FOR COMMON SIDE EFFECTS:  - Mild abdominal pain and bloating or excessive gas: rest, eat lightly and   use a heating pad.  SYMPTOMS TO WATCH FOR AND REPORT TO YOUR PHYSICIAN:  1. Severe abdominal pain.  2. Fever within 24 hours after a procedure.  3. A large amount of rectal bleeding. (A small amount of blood from the   rectum is not serious, especially if hemorrhoids are present.  3.  Because air was put into your colon during the procedure, expelling   large amounts of air from your rectum is normal.  4.  You may not have a bowel movement for 1-3 days because of the   colonoscopy prep.  This is normal.  5.  Call immediately if you notice any of the following:   Chills and/or fever over 101   Persistent vomiting   Severe abdominal pain, other than gas cramps   Severe chest pain   Black, tarry stools   Any bleeding - exceeding one tablespoon  Your doctor recommends these additional instructions:  Eat a high fiber diet.   Take a fiber supplement, for example Citrucel, Fibercon, Konsyl or   Metamucil.   Take a PROBIOTIC, such as a carton of GREEK YOGURT (Chobani or Oikos,  or   Activia or Dannon);  or tablets of ALIGN or CULTURELLE or NEW-Q (all   non-prescription), every day for a month.   And repeat this 3-4 times a   year.  Return to your primary care physician as previously scheduled.   Your physician has recommended a repeat colonoscopy in 7-8 years for   screening purposes.  None  If you have any questions or problems, please call  your physician.  EMERGENCY PHONE NUMBER: [phone]  LAB RESULTS: Call in two (2) weeks for lab results, [phone]  ___________________________________________  Nurse Signature  ___________________________________________  Patient/Designated Responsible Party Signature  Juan David Davis MD  9/9/2019 12:05:44 PM  This report has been verified and signed electronically.  [employer]

## 2019-11-04 ENCOUNTER — DOCUMENTATION ONLY (OUTPATIENT)
Dept: RHEUMATOLOGY | Facility: CLINIC | Age: 64
End: 2019-11-04